# Patient Record
Sex: MALE | Race: WHITE | Employment: PART TIME | RURAL
[De-identification: names, ages, dates, MRNs, and addresses within clinical notes are randomized per-mention and may not be internally consistent; named-entity substitution may affect disease eponyms.]

---

## 2017-01-04 ENCOUNTER — OFFICE VISIT (OUTPATIENT)
Dept: PRIMARY CARE CLINIC | Age: 72
End: 2017-01-04
Payer: MEDICARE

## 2017-01-04 VITALS
DIASTOLIC BLOOD PRESSURE: 78 MMHG | OXYGEN SATURATION: 97 % | HEIGHT: 72 IN | BODY MASS INDEX: 31.69 KG/M2 | RESPIRATION RATE: 20 BRPM | WEIGHT: 234 LBS | SYSTOLIC BLOOD PRESSURE: 130 MMHG | HEART RATE: 71 BPM

## 2017-01-04 DIAGNOSIS — I10 ESSENTIAL HYPERTENSION: Primary | ICD-10-CM

## 2017-01-04 DIAGNOSIS — Z23 NEED FOR PNEUMOCOCCAL VACCINATION: ICD-10-CM

## 2017-01-04 DIAGNOSIS — M17.0 PRIMARY OSTEOARTHRITIS OF BOTH KNEES: ICD-10-CM

## 2017-01-04 DIAGNOSIS — I25.10 CORONARY ARTERY DISEASE INVOLVING NATIVE CORONARY ARTERY OF NATIVE HEART WITHOUT ANGINA PECTORIS: ICD-10-CM

## 2017-01-04 PROCEDURE — 99214 OFFICE O/P EST MOD 30 MIN: CPT | Performed by: FAMILY MEDICINE

## 2017-01-04 PROCEDURE — 90670 PCV13 VACCINE IM: CPT | Performed by: FAMILY MEDICINE

## 2017-01-04 PROCEDURE — G0009 ADMIN PNEUMOCOCCAL VACCINE: HCPCS | Performed by: FAMILY MEDICINE

## 2017-01-04 RX ORDER — CLOPIDOGREL BISULFATE 75 MG/1
75 TABLET ORAL DAILY
COMMUNITY
Start: 2016-12-19 | End: 2017-12-28 | Stop reason: ALTCHOICE

## 2017-01-04 RX ORDER — ATORVASTATIN CALCIUM 80 MG/1
80 TABLET, FILM COATED ORAL NIGHTLY
COMMUNITY
Start: 2016-12-19 | End: 2017-04-04 | Stop reason: SINTOL

## 2017-01-04 RX ORDER — CARVEDILOL 12.5 MG/1
12.5 TABLET ORAL EVERY 12 HOURS
COMMUNITY
Start: 2016-12-19

## 2017-01-04 RX ORDER — LISINOPRIL 10 MG/1
10 TABLET ORAL DAILY
COMMUNITY
Start: 2016-12-06

## 2017-01-04 ASSESSMENT — ENCOUNTER SYMPTOMS
SHORTNESS OF BREATH: 0
EYE DISCHARGE: 0
EYE REDNESS: 0
SORE THROAT: 0
VOMITING: 0
CONSTIPATION: 0
NAUSEA: 0
EYE ITCHING: 0
DIARRHEA: 0
COUGH: 0
ABDOMINAL PAIN: 0
RHINORRHEA: 0

## 2017-01-05 PROBLEM — I25.10 CORONARY ARTERY DISEASE INVOLVING NATIVE CORONARY ARTERY OF NATIVE HEART WITHOUT ANGINA PECTORIS: Status: ACTIVE | Noted: 2017-01-05

## 2017-02-15 ENCOUNTER — OFFICE VISIT (OUTPATIENT)
Dept: CARDIOLOGY | Facility: CLINIC | Age: 72
End: 2017-02-15

## 2017-02-15 VITALS
DIASTOLIC BLOOD PRESSURE: 92 MMHG | WEIGHT: 230.8 LBS | BODY MASS INDEX: 31.26 KG/M2 | HEART RATE: 67 BPM | HEIGHT: 72 IN | SYSTOLIC BLOOD PRESSURE: 136 MMHG

## 2017-02-15 DIAGNOSIS — E78.5 DYSLIPIDEMIA: ICD-10-CM

## 2017-02-15 DIAGNOSIS — Z72.0 TOBACCO ABUSE: ICD-10-CM

## 2017-02-15 DIAGNOSIS — I25.10 CORONARY ARTERY DISEASE INVOLVING NATIVE CORONARY ARTERY OF NATIVE HEART WITHOUT ANGINA PECTORIS: Primary | ICD-10-CM

## 2017-02-15 DIAGNOSIS — I65.23 BILATERAL CAROTID ARTERY STENOSIS: ICD-10-CM

## 2017-02-15 DIAGNOSIS — I10 ESSENTIAL HYPERTENSION: ICD-10-CM

## 2017-02-15 PROCEDURE — 99213 OFFICE O/P EST LOW 20 MIN: CPT | Performed by: INTERNAL MEDICINE

## 2017-02-15 NOTE — PROGRESS NOTES
[facility] AT [address], [address]  [place], [place], [address] [phone]  WWW.[facility]"[employer]           OUTPATIENT CLINIC FOLLOW UP NOTE    Encounter Date:11/02/2016    Patient Care Team:  Patient Care Team:  Mariia Dallas MD as PCP - General (Hospice and Palliative Medicine)    Subjective:   Reason for consultation:   Chief Complaint   Patient presents with   • Follow-up     CAD/HTN/ACS       HPI:    Thor Ca is a 71 y.o. male.  History of Present Illness  The patient is a 70-year-old gentleman with a recent  Non-STEMI after presentation of chest pain and elevated troponin.  Coronary angiography 9/2016 revealed at worst an intermediate mid LAD stenosis which was not functionally significant by FFR.  It is theorized that he self revascularized prior to angiography.  Echocardiography revealed no significant wall motion abnormality or drop in his ejection fraction.  Today he presents for follow-up and is doing well.      He has not had a recurrence of his chest pain.  He has been active [place] symptoms of angina, dyspnea with exertion, PND, othopnea, lower external edema.    The patient continues to smoke occasionally.  He believes the nicotine patches helped but was not able to quit entirely    PFSH:  Patient Active Problem List   Diagnosis   • Coronary artery disease involving native coronary artery of native heart without angina pectoris   • Essential hypertension   • ACS (acute coronary syndrome)   • Tobacco abuse   • Dyslipidemia         Current Outpatient Prescriptions:   •  aspirin 81 MG tablet, Take 2 tablets by mouth daily., Disp: 30 tablet, Rfl: 1  •  atorvastatin (LIPITOR) 80 MG tablet, Take 1 tablet by mouth Every Night., Disp: 30 tablet, Rfl: 10  •  carvedilol (COREG) 12.5 MG tablet, Take 1 tablet by mouth Every 12 (Twelve) Hours., Disp: 60 tablet, Rfl: 10  •  clopidogrel (PLAVIX) 75 MG tablet, Take 1 tablet  "by mouth Daily., Disp: 30 tablet, Rfl: 8  •  lisinopril (PRINIVIL,ZESTRIL) 10 MG tablet, Take 1 tablet by mouth Daily., Disp: 30 tablet, Rfl: 11  •  nicotine (NICODERM CQ) 21 MG/24HR patch, Place 1 patch on the skin Daily., Disp: 30 patch, Rfl: 0    No Known Allergies     reports that he has been smoking Cigarettes.  He has a 25.00 pack-year smoking history. He has never used smokeless tobacco.    Family History   Problem Relation Age of Onset   • Heart disease Mother    • Diabetes Mother        Review of Systems:  Negative for exertional chest pain, dyspnea with exertion, orthopnea, PND, lower extremity edema, palpitations, lightheadedness, syncope.   Review of Systems  All other systems reviewed and are negative.    Objective:   Blood pressure 136/92, pulse 67, height 72\" (182.9 cm), weight 230 lb 12.8 oz (105 kg).  Physical Exam  CONSTITUTIONAL: No acute distress, normal affect  NECK: No masses. Trachea midline  RESPIRATORY: Normal effort. Clear to auscultation bilaterally without wheezing or rales  CARDIOVASCULAR: Jugular venous pressure within normal limits. Carotid bruit right side; normal carotid upstrokes without bruits.  Regular rate and rhythm with normal S1 and S2. Without murmur, gallop or rub.  PERIPHERAL VASCULAR: Normal radial pulses bilaterally. There is very mild left lower extremity edema bilaterally.    Labs:  Lab Results   Component Value Date    ALT 29 09/16/2016    AST 38 (H) 09/16/2016     Lab Results   Component Value Date    CHOL 179 09/16/2016    TRIG 236 (H) 09/16/2016    HDL 37 (L) 09/16/2016    LDLDIRECT 113 09/16/2016    CREATININE 0.90 09/17/2016       Diagnostic Data:    Procedures  Coronary Angiogram 9/2016  Mild to moderate 2 vessel CAD involving the LAD and Ramus, no culprit lesion or functionally significant stenosis was found for intervention. Borderline 50% mid LAD stenosis with 0.91 FFR. The patient possibly had a ruptured plaque that has since revascularized.    TTE " 9/2016  · Left ventricular function is normal. Estimated EF = 64%.  · Left ventricular diastolic dysfunction (grade I a) consistent with impaired relaxation.  · Left ventricular wall thickness is consistent with mild concentric hypertrophy.  · All left ventricular wall segments contract normally.  · Mild aortic valve stenosis is present.  · There is no evidence of pericardial effusion.  · Estimated right ventricular systolic pressure from tricuspid regurgitation is normal (<35 mmHg).    Assessment and Plan:   Coronary artery disease involving native coronary artery of native heart without angina pectoris  Comments:  -CCS 0, NSTEMI 9/2016, no PCI, EF normal  -Continue DAPT for at least 1 year (started 9/2016)  -Carvedilol, statin     Essential hypertension  Comments:  -130/80s at home  -Carvedilol, lisinopril     Tobacco abuse  Comments:  -Smoking a lot less, but still smoking; used nicotine patch this past winter 2016     Bruit of right carotid artery  Comments:  -Duplex with mild to moderate disease; DAPT, statin    Dyslipidemia  Comments:  -High dose statin    -Return in about 9 months (around 11/15/2017).  -Patient going fishing in [place]    Bill Ward MD, MSc, FACC  Interventional Cardiology  [facility]    Addendum  Carotid US with mild to moderate bilateral atherosclerosis 2/2017    Bill Ward MD, MSc, FACC  Interventional Cardiology  [facility]

## 2017-04-04 ENCOUNTER — HOSPITAL ENCOUNTER (OUTPATIENT)
Dept: OTHER | Age: 72
Discharge: HOME | End: 2017-04-04
Attending: FAMILY MEDICINE | Admitting: FAMILY MEDICINE

## 2017-04-04 ENCOUNTER — OFFICE VISIT (OUTPATIENT)
Dept: PRIMARY CARE CLINIC | Age: 72
End: 2017-04-04
Payer: MEDICARE

## 2017-04-04 VITALS
DIASTOLIC BLOOD PRESSURE: 85 MMHG | HEART RATE: 67 BPM | WEIGHT: 228.4 LBS | OXYGEN SATURATION: 98 % | SYSTOLIC BLOOD PRESSURE: 138 MMHG | RESPIRATION RATE: 20 BRPM | BODY MASS INDEX: 30.94 KG/M2 | HEIGHT: 72 IN

## 2017-04-04 DIAGNOSIS — Z12.11 SCREENING FOR COLORECTAL CANCER: ICD-10-CM

## 2017-04-04 DIAGNOSIS — M17.0 PRIMARY OSTEOARTHRITIS OF BOTH KNEES: ICD-10-CM

## 2017-04-04 DIAGNOSIS — I10 ESSENTIAL HYPERTENSION: Primary | ICD-10-CM

## 2017-04-04 DIAGNOSIS — I25.10 CORONARY ARTERY DISEASE INVOLVING NATIVE CORONARY ARTERY OF NATIVE HEART WITHOUT ANGINA PECTORIS: ICD-10-CM

## 2017-04-04 DIAGNOSIS — Z12.12 SCREENING FOR COLORECTAL CANCER: ICD-10-CM

## 2017-04-04 LAB
A/G RATIO: 1.6 (ref 0.8–2)
ALBUMIN SERPL-MCNC: 4.1 G/DL (ref 3.4–4.8)
ALP BLD-CCNC: 110 U/L (ref 25–100)
ALT SERPL-CCNC: 23 U/L (ref 4–36)
ANION GAP SERPL CALCULATED.3IONS-SCNC: 14 MMOL/L (ref 3–16)
AST SERPL-CCNC: 20 U/L (ref 8–33)
BILIRUB SERPL-MCNC: 0.3 MG/DL (ref 0.3–1.2)
BUN BLDV-MCNC: 21 MG/DL (ref 6–20)
CALCIUM SERPL-MCNC: 9.5 MG/DL (ref 8.5–10.5)
CHLORIDE BLD-SCNC: 106 MMOL/L (ref 98–107)
CHOLESTEROL, TOTAL: 117 MG/DL (ref 0–200)
CO2: 23 MMOL/L (ref 20–30)
CREAT SERPL-MCNC: 1.3 MG/DL (ref 0.4–1.2)
GFR AFRICAN AMERICAN: >59
GFR NON-AFRICAN AMERICAN: 55
GLOBULIN: 2.5 G/DL
GLUCOSE BLD-MCNC: 105 MG/DL (ref 74–106)
HCT VFR BLD CALC: 39.1 % (ref 40–54)
HDLC SERPL-MCNC: 38 MG/DL (ref 40–60)
HEMOGLOBIN: 13.1 G/DL (ref 13–18)
LDL CHOLESTEROL CALCULATED: 59 MG/DL
LDL CHOLESTEROL DIRECT: 66 MG/DL
MCH RBC QN AUTO: 31.6 PG (ref 27–32)
MCHC RBC AUTO-ENTMCNC: 33.5 G/DL (ref 31–35)
MCV RBC AUTO: 94.2 FL (ref 80–100)
PDW BLD-RTO: 13.3 % (ref 11–16)
PLATELET # BLD: 269 K/UL (ref 150–400)
PMV BLD AUTO: 10.9 FL (ref 6–10)
POTASSIUM SERPL-SCNC: 4.6 MMOL/L (ref 3.4–5.1)
RBC # BLD: 4.15 M/UL (ref 4.5–6)
SODIUM BLD-SCNC: 143 MMOL/L (ref 136–145)
TOTAL PROTEIN: 6.6 G/DL (ref 6.4–8.3)
TRIGL SERPL-MCNC: 99 MG/DL (ref 0–249)
VLDLC SERPL CALC-MCNC: 20 MG/DL
WBC # BLD: 10.7 K/UL (ref 4–11)

## 2017-04-04 PROCEDURE — 20610 DRAIN/INJ JOINT/BURSA W/O US: CPT | Performed by: FAMILY MEDICINE

## 2017-04-04 PROCEDURE — 99214 OFFICE O/P EST MOD 30 MIN: CPT | Performed by: FAMILY MEDICINE

## 2017-04-04 RX ORDER — ROSUVASTATIN CALCIUM 40 MG/1
40 TABLET, COATED ORAL EVERY EVENING
Qty: 30 TABLET | Refills: 3 | Status: SHIPPED | OUTPATIENT
Start: 2017-04-04 | End: 2017-10-25 | Stop reason: SDUPTHER

## 2017-04-04 RX ORDER — TRIAMCINOLONE ACETONIDE 40 MG/ML
40 INJECTION, SUSPENSION INTRA-ARTICULAR; INTRAMUSCULAR ONCE
Status: COMPLETED | OUTPATIENT
Start: 2017-04-04 | End: 2017-04-04

## 2017-04-04 RX ADMIN — TRIAMCINOLONE ACETONIDE 40 MG: 40 INJECTION, SUSPENSION INTRA-ARTICULAR; INTRAMUSCULAR at 10:13

## 2017-04-04 ASSESSMENT — ENCOUNTER SYMPTOMS
SORE THROAT: 0
CONSTIPATION: 0
DIARRHEA: 0
NAUSEA: 0
COUGH: 0
SHORTNESS OF BREATH: 0
VOMITING: 0
RHINORRHEA: 0
EYE ITCHING: 0
ABDOMINAL PAIN: 0
BACK PAIN: 1
EYE DISCHARGE: 0
EYE REDNESS: 0

## 2017-10-25 ENCOUNTER — OFFICE VISIT (OUTPATIENT)
Dept: PRIMARY CARE CLINIC | Age: 72
End: 2017-10-25
Payer: MEDICARE

## 2017-10-25 VITALS
BODY MASS INDEX: 29.53 KG/M2 | DIASTOLIC BLOOD PRESSURE: 76 MMHG | HEIGHT: 72 IN | OXYGEN SATURATION: 97 % | RESPIRATION RATE: 20 BRPM | SYSTOLIC BLOOD PRESSURE: 134 MMHG | WEIGHT: 218 LBS | HEART RATE: 69 BPM

## 2017-10-25 DIAGNOSIS — I25.10 CORONARY ARTERY DISEASE INVOLVING NATIVE CORONARY ARTERY OF NATIVE HEART WITHOUT ANGINA PECTORIS: ICD-10-CM

## 2017-10-25 DIAGNOSIS — Z12.11 COLON CANCER SCREENING: ICD-10-CM

## 2017-10-25 DIAGNOSIS — M17.0 PRIMARY OSTEOARTHRITIS OF BOTH KNEES: ICD-10-CM

## 2017-10-25 DIAGNOSIS — I10 ESSENTIAL HYPERTENSION: Primary | ICD-10-CM

## 2017-10-25 PROCEDURE — G8419 CALC BMI OUT NRM PARAM NOF/U: HCPCS | Performed by: FAMILY MEDICINE

## 2017-10-25 PROCEDURE — 1123F ACP DISCUSS/DSCN MKR DOCD: CPT | Performed by: FAMILY MEDICINE

## 2017-10-25 PROCEDURE — 3017F COLORECTAL CA SCREEN DOC REV: CPT | Performed by: FAMILY MEDICINE

## 2017-10-25 PROCEDURE — G8598 ASA/ANTIPLAT THER USED: HCPCS | Performed by: FAMILY MEDICINE

## 2017-10-25 PROCEDURE — 99214 OFFICE O/P EST MOD 30 MIN: CPT | Performed by: FAMILY MEDICINE

## 2017-10-25 PROCEDURE — 4040F PNEUMOC VAC/ADMIN/RCVD: CPT | Performed by: FAMILY MEDICINE

## 2017-10-25 PROCEDURE — 20610 DRAIN/INJ JOINT/BURSA W/O US: CPT | Performed by: FAMILY MEDICINE

## 2017-10-25 PROCEDURE — 4004F PT TOBACCO SCREEN RCVD TLK: CPT | Performed by: FAMILY MEDICINE

## 2017-10-25 PROCEDURE — G8427 DOCREV CUR MEDS BY ELIG CLIN: HCPCS | Performed by: FAMILY MEDICINE

## 2017-10-25 PROCEDURE — G8484 FLU IMMUNIZE NO ADMIN: HCPCS | Performed by: FAMILY MEDICINE

## 2017-10-25 RX ORDER — NICOTINE 21 MG/24HR
1 PATCH, TRANSDERMAL 24 HOURS TRANSDERMAL
COMMUNITY
Start: 2016-11-02 | End: 2017-10-25

## 2017-10-25 RX ORDER — OXYCODONE AND ACETAMINOPHEN 7.5; 325 MG/1; MG/1
1 TABLET ORAL EVERY 6 HOURS PRN
Qty: 90 TABLET | Refills: 0 | Status: SHIPPED | OUTPATIENT
Start: 2017-10-25 | End: 2018-04-25 | Stop reason: SDUPTHER

## 2017-10-25 RX ORDER — ROSUVASTATIN CALCIUM 40 MG/1
40 TABLET, COATED ORAL EVERY EVENING
Qty: 30 TABLET | Refills: 3 | Status: SHIPPED | OUTPATIENT
Start: 2017-10-25 | End: 2018-04-25

## 2017-10-25 RX ORDER — TRIAMCINOLONE ACETONIDE 40 MG/ML
40 INJECTION, SUSPENSION INTRA-ARTICULAR; INTRAMUSCULAR ONCE
Status: COMPLETED | OUTPATIENT
Start: 2017-10-25 | End: 2017-10-25

## 2017-10-25 RX ADMIN — TRIAMCINOLONE ACETONIDE 40 MG: 40 INJECTION, SUSPENSION INTRA-ARTICULAR; INTRAMUSCULAR at 15:21

## 2017-10-25 ASSESSMENT — ENCOUNTER SYMPTOMS
ABDOMINAL PAIN: 0
CONSTIPATION: 0
VOMITING: 0
RHINORRHEA: 0
DIARRHEA: 0
BACK PAIN: 1
SHORTNESS OF BREATH: 0
EYE DISCHARGE: 0
EYE ITCHING: 0
SORE THROAT: 0
EYE REDNESS: 0
COUGH: 0
NAUSEA: 0

## 2017-10-25 NOTE — PROGRESS NOTES
SUBJECTIVE:    Patient ID: Gómez Forrester is a 73 y.o. male. Chief Complaint   Patient presents with    Hypertension     f/u    Coronary Artery Disease     f/u    Arthritis     f/u    Knee Pain     left         HPI: Patient has history of Hypertension. They are taking: lisinopril (Prinivil) and coreg. Patient is compliant with medications. Today, BP is under control. He does not check His BP at home. He does monitor His salt intake. He does not exercise regularly. Patient does have known CAD as well. He is asymptomatic. Patient denies CP, SOA, palpitations. He does f/u with cardiology regularly. He is on appropriate medications of crestor, aspirin, plavix, lisinopril, and coreg. Patient has been complaining of left knee pain for several years. This is secondary to arthritis. Pain is 6/10. Pain is aching. Pain radiates to lower leg. Pain is worse with movement, walking, standing, night, better with rest. Symptoms are getting worse . He does not have numbness, tingling  Patient has tried percocet and steroid injections with marked improvement. Patient denies side effect from medications. The medication does help with ADL's and he takes it only occasionally. He has an appt with ortho in a couple months and he plans to get a knee replacement. Joint injection:  left Knee   Consent obtained. Risks and benefits explained and patient understands. left lateral knee marked for site of injection. Area cleansed with betadine and alcohol. Ethyl chloride applied to the area. Injection given with 25 gauge needle with 2cc 1% Lidocaine, and 1cc 40mg Kenalog. There were no complications and the patient tolerated the procedure well. Patient's medications, allergies, past medical, surgical, social and family histories were reviewed and updated as appropriate. Review of Systems   Constitutional: Negative for chills, fatigue and fever.    HENT: Negative for congestion, ear pain, rhinorrhea coronary artery of native heart without angina pectoris     Stable at this time. Patient is to continue lisinopril, crestor, carvedilol, aspirin and Plavix. He is to continue to follow-up with cardiology as scheduled. Relevant Medications    rosuvastatin (CRESTOR) 40 MG tablet      Other Visit Diagnoses     Colon cancer screening        Relevant Orders    POCT Fecal Immunochemical Test (FIT)        Controlled Substances Monitoring:     Attestation: The Prescription Monitoring Report was requested today but not available. Kamryn Coker DO)  Documentation: Possible medication side effects, risk of tolerance and/or dependence, and alternative treatments discussed., Obtaining appropriate analgesic effect of treatment., No signs of potential drug abuse or diversion identified., Existing medication contract. Kamryn Coker DO)      Return in about 3 months (around 1/25/2018) for HTN, CAD, knee pain.

## 2017-10-25 NOTE — PATIENT INSTRUCTIONS
you're down to about seven cigarettes a day, it's time to set your target quit date, and get ready to stick to it. Don't Smoke \"Automatically\"   Smoke only those cigarettes you really want. Catch yourself before you light up a cigarette out of pure habit. Don't empty your ashtrays. This will remind you of how many cigarettes you've smoked each day, and the sight and the smell of stale cigarettes butts will be very unpleasant. Make yourself aware of each cigarette by using the opposite hand or putting cigarettes in an unfamiliar location or a different pocket to break the automatic reach. If you light up many times during the day without even thinking about it, try to look in a mirror each time you put a match to your cigarette. You may decide you don't need it. Make Smoking Inconvenient   Stop buying cigarettes by the carton. Wait until one pack is empty before you buy another. Stop carrying cigarettes with you at home or at work. Make them difficult to get to. Make Smoking Unpleasant   Smoke only under circumstances that aren't especially pleasurable for you. If you like to smoke with others, smoke alone. Turn your chair to an empty corner and focus only on the cigarette you are smoking and all its many negative effects. Collect all your cigarette butts in one large glass container as a visual reminder of the filth made by smoking. Just Before Quitting   Practice going without cigarettes. Don't think of never smoking again. Think of quitting in terms of one day at a time . Tell yourself you won't smoke today, and then don't. Clean your clothes to rid them of the cigarette smell, which can linger a long time. On the Day You Quit   Throw away all your cigarettes and matches. Hide your lighters and ashtrays. Visit the dentist and have your teeth cleaned to get rid of tobacco stains. Notice how nice they look and resolve to keep them that way.    Make a list of things you'd like to buy for

## 2017-11-10 ENCOUNTER — TELEPHONE (OUTPATIENT)
Dept: PRIMARY CARE CLINIC | Age: 72
End: 2017-11-10

## 2017-11-10 NOTE — ASSESSMENT & PLAN NOTE
Stable at this time. Patient is to continue lisinopril, crestor, carvedilol, aspirin and Plavix. He is to continue to follow-up with cardiology as scheduled.

## 2017-11-15 ENCOUNTER — OFFICE VISIT (OUTPATIENT)
Dept: CARDIOLOGY | Facility: CLINIC | Age: 72
End: 2017-11-15

## 2017-11-15 VITALS
WEIGHT: 214.8 LBS | SYSTOLIC BLOOD PRESSURE: 130 MMHG | DIASTOLIC BLOOD PRESSURE: 88 MMHG | HEART RATE: 66 BPM | HEIGHT: 72 IN | BODY MASS INDEX: 29.09 KG/M2

## 2017-11-15 DIAGNOSIS — I25.10 CORONARY ARTERY DISEASE INVOLVING NATIVE CORONARY ARTERY OF NATIVE HEART WITHOUT ANGINA PECTORIS: Primary | ICD-10-CM

## 2017-11-15 DIAGNOSIS — E78.5 DYSLIPIDEMIA: ICD-10-CM

## 2017-11-15 DIAGNOSIS — I10 ESSENTIAL HYPERTENSION: ICD-10-CM

## 2017-11-15 DIAGNOSIS — I73.9 CLAUDICATION (HCC): ICD-10-CM

## 2017-11-15 DIAGNOSIS — I65.23 BILATERAL CAROTID ARTERY STENOSIS: ICD-10-CM

## 2017-11-15 DIAGNOSIS — Z72.0 TOBACCO ABUSE: ICD-10-CM

## 2017-11-15 PROCEDURE — 99214 OFFICE O/P EST MOD 30 MIN: CPT | Performed by: INTERNAL MEDICINE

## 2017-11-15 NOTE — PROGRESS NOTES
[facility] AT [address], [address]  [place], [place], [address] [phone]  WWW.[facility]           OUTPATIENT CLINIC FOLLOW UP NOTE    Patient Care Team:  Patient Care Team:  Jeanne Winston DO as PCP - General (Family Medicine)    Subjective:   Reason for consultation:   Chief Complaint   Patient presents with   • Coronary Artery Disease       HPI:    Thor Ca is a 71 y.o. male.  Coronary Artery Disease       The patient has a history of an NSTEMI after a presentation of chest pain in 9/2016. LHC revealed at worst an intermediate mid LAD stenosis which was not functionally significant by FFR.  It is theorized that he auto-revascularized prior to angiography. Echocardiography revealed no significant wall motion abnormality or drop in his ejection fraction.  He also has a history of hypertension, hyperlipidemia, tobacco abuse, bilateral carotid stenosis, possible claudication.  Today he presents for follow-up    Since his last visit he has overall been doing well.  He did have a period of myalgias with atorvastatin and was switched to another statin, possibly Crestor 40 mg nightly.  He states that he's been tolerating Crestor well without significant myalgias.  He does have bilateral calf discomfort when walking.  This is mild, acute on chronic, worse with inclines, relieved with rest.    He has not had a recurrence of his chest pain.      The patient continues to smoke occasionally.  He believes the nicotine patches helped but was not able to quit entirely    PFSH:  Patient Active Problem List   Diagnosis   • Coronary artery disease involving native coronary artery of native heart without angina pectoris   • Essential hypertension   • Tobacco abuse   • Dyslipidemia   • Bilateral carotid artery stenosis         Current Outpatient Prescriptions:   •  aspirin 81 MG tablet, Take 2 tablets by mouth daily., Disp: 30 tablet, Rfl: 1  •  carvedilol (COREG) 12.5  "MG tablet, Take 1 tablet by mouth Every 12 (Twelve) Hours., Disp: 60 tablet, Rfl: 10  •  lisinopril (PRINIVIL,ZESTRIL) 10 MG tablet, Take 1 tablet by mouth Daily., Disp: 30 tablet, Rfl: 11    No Known Allergies     reports that he has been smoking Cigarettes.  He has a 25.00 pack-year smoking history. He has never used smokeless tobacco.    Family History   Problem Relation Age of Onset   • Heart disease Mother    • Diabetes Mother        Review of Systems:  Negative for exertional chest pain, dyspnea with exertion, orthopnea, PND, lower extremity edema, palpitations, lightheadedness, syncope.   Review of Systems  All other systems reviewed and are negative.    Objective:   Blood pressure 130/88, pulse 66, height 72\" (182.9 cm), weight 214 lb 12.8 oz (97.4 kg).  Physical Exam  CONSTITUTIONAL: No acute distress, normal affect  RESPIRATORY: Normal effort. Clear to auscultation bilaterally without wheezing or rales  CARDIOVASCULAR: Carotid bruit right side. Regular rate and rhythm with normal S1 and S2. Without gallop or rub. CHAR at RUSB without radiation  PERIPHERAL VASCULAR: Normal radial pulses bilaterally. There is mild right lower extremity edema     Labs:  Lab Results   Component Value Date    ALT 29 09/16/2016    AST 38 (H) 09/16/2016     Lab Results   Component Value Date    CHOL 179 09/16/2016    TRIG 236 (H) 09/16/2016    HDL 37 (L) 09/16/2016    LDLDIRECT 113 09/16/2016    CREATININE 0.90 09/17/2016       Diagnostic Data:    Procedures  Coronary Angiogram 9/2016  Mild to moderate 2 vessel CAD involving the LAD and Ramus, no culprit lesion or functionally significant stenosis was found for intervention. Borderline 50% mid LAD stenosis with 0.91 FFR. The patient possibly had a ruptured plaque that has since revascularized.    Carotid Duplex US 9/2016  · Proximal right internal carotid artery stenosis of 50-69%.  · Proximal left internal carotid artery stenosis of 0-49%.    TTE 9/2016  · Left ventricular " function is normal. Estimated EF = 64%.  · Left ventricular diastolic dysfunction (grade I a) consistent with impaired relaxation.  · Left ventricular wall thickness is consistent with mild concentric hypertrophy.  · All left ventricular wall segments contract normally.  · Mild aortic valve stenosis is present.  · There is no evidence of pericardial effusion.  · Estimated right ventricular systolic pressure from tricuspid regurgitation is normal (<35 mmHg).    Assessment and Plan:   Coronary artery disease involving native coronary artery of native heart without angina pectoris  -CCS 0, NSTEMI 9/2016, negative iFR of intermediate LAD disease, no PCI, EF normal  -Discontinue clopidogrel  -Continue ASA, carvedilol, statin     Essential hypertension  -130/80s at home  -Carvedilol, lisinopril     Tobacco abuse  -Smoking a lot less, but still smoking; used nicotine patch winter 2016     Bruit of right carotid artery  -Duplex with mild to moderate disease; ASA, statin    Dyslipidemia  -Myalgias when on atorvastatin  -Continue rosuvastatin  -Repeat labs    Claudication  -Mild symptoms.  Recommended an arterial duplex ultrasound and exercise ABIs if the symptoms persist/worsen.  The patient would like to hold off for now.  -Recommended continued exercise, especially walking  -Continue aspirin and statin.    -Return in about 9 months (around 8/15/2018).  -Patient going fishing in [place]    Bill Ward MD, MSc, FACC  Interventional Cardiology  [facility]    Bill Ward MD, MSc, FACC  Interventional Cardiology  [facility]

## 2017-11-17 ENCOUNTER — HOSPITAL ENCOUNTER (OUTPATIENT)
Dept: OTHER | Age: 72
Discharge: HOME | End: 2017-11-17
Attending: INTERNAL MEDICINE | Admitting: INTERNAL MEDICINE

## 2017-11-20 DIAGNOSIS — I10 ESSENTIAL HYPERTENSION: ICD-10-CM

## 2017-11-20 DIAGNOSIS — I25.10 CORONARY ARTERY DISEASE INVOLVING NATIVE CORONARY ARTERY OF NATIVE HEART WITHOUT ANGINA PECTORIS: ICD-10-CM

## 2017-11-20 DIAGNOSIS — E78.5 DYSLIPIDEMIA: ICD-10-CM

## 2017-11-20 DIAGNOSIS — Z72.0 TOBACCO ABUSE: ICD-10-CM

## 2017-11-20 DIAGNOSIS — I65.23 BILATERAL CAROTID ARTERY STENOSIS: ICD-10-CM

## 2017-11-21 ENCOUNTER — TELEPHONE (OUTPATIENT)
Dept: CARDIOLOGY | Facility: CLINIC | Age: 72
End: 2017-11-21

## 2017-11-21 NOTE — TELEPHONE ENCOUNTER
Results of lab work reviewed with the patient's spouse.  I advised that his renal function has increased and he should follow up with his PCP to further investigate this.  Understanding verbalized at this time.

## 2017-12-08 RX ORDER — LISINOPRIL 10 MG/1
TABLET ORAL
Qty: 30 TABLET | Refills: 11 | Status: SHIPPED | OUTPATIENT
Start: 2017-12-08 | End: 2018-07-05 | Stop reason: SDUPTHER

## 2017-12-12 RX ORDER — CARVEDILOL 12.5 MG/1
12.5 TABLET ORAL EVERY 12 HOURS SCHEDULED
Qty: 60 TABLET | Refills: 11 | Status: SHIPPED | OUTPATIENT
Start: 2017-12-12 | End: 2018-12-14 | Stop reason: SDUPTHER

## 2017-12-28 ENCOUNTER — OFFICE VISIT (OUTPATIENT)
Dept: PRIMARY CARE CLINIC | Age: 72
End: 2017-12-28
Payer: MEDICARE

## 2017-12-28 VITALS
BODY MASS INDEX: 29.26 KG/M2 | DIASTOLIC BLOOD PRESSURE: 76 MMHG | TEMPERATURE: 98.2 F | OXYGEN SATURATION: 99 % | HEART RATE: 67 BPM | SYSTOLIC BLOOD PRESSURE: 136 MMHG | WEIGHT: 216 LBS | RESPIRATION RATE: 20 BRPM | HEIGHT: 72 IN

## 2017-12-28 DIAGNOSIS — F41.9 ANXIETY: ICD-10-CM

## 2017-12-28 DIAGNOSIS — Z12.11 COLON CANCER SCREENING: ICD-10-CM

## 2017-12-28 DIAGNOSIS — R11.2 NAUSEA AND VOMITING, INTRACTABILITY OF VOMITING NOT SPECIFIED, UNSPECIFIED VOMITING TYPE: Primary | ICD-10-CM

## 2017-12-28 DIAGNOSIS — K21.9 GASTROESOPHAGEAL REFLUX DISEASE, ESOPHAGITIS PRESENCE NOT SPECIFIED: ICD-10-CM

## 2017-12-28 LAB
CONTROL: NORMAL
HEMOCCULT STL QL: NORMAL

## 2017-12-28 PROCEDURE — G8427 DOCREV CUR MEDS BY ELIG CLIN: HCPCS | Performed by: FAMILY MEDICINE

## 2017-12-28 PROCEDURE — G8484 FLU IMMUNIZE NO ADMIN: HCPCS | Performed by: FAMILY MEDICINE

## 2017-12-28 PROCEDURE — G8598 ASA/ANTIPLAT THER USED: HCPCS | Performed by: FAMILY MEDICINE

## 2017-12-28 PROCEDURE — 99213 OFFICE O/P EST LOW 20 MIN: CPT | Performed by: FAMILY MEDICINE

## 2017-12-28 PROCEDURE — 1123F ACP DISCUSS/DSCN MKR DOCD: CPT | Performed by: FAMILY MEDICINE

## 2017-12-28 PROCEDURE — 4040F PNEUMOC VAC/ADMIN/RCVD: CPT | Performed by: FAMILY MEDICINE

## 2017-12-28 PROCEDURE — 3017F COLORECTAL CA SCREEN DOC REV: CPT | Performed by: FAMILY MEDICINE

## 2017-12-28 PROCEDURE — 82274 ASSAY TEST FOR BLOOD FECAL: CPT | Performed by: FAMILY MEDICINE

## 2017-12-28 PROCEDURE — 4004F PT TOBACCO SCREEN RCVD TLK: CPT | Performed by: FAMILY MEDICINE

## 2017-12-28 PROCEDURE — G8419 CALC BMI OUT NRM PARAM NOF/U: HCPCS | Performed by: FAMILY MEDICINE

## 2017-12-28 RX ORDER — PANTOPRAZOLE SODIUM 20 MG/1
20 TABLET, DELAYED RELEASE ORAL DAILY
Qty: 30 TABLET | Refills: 3 | Status: SHIPPED | OUTPATIENT
Start: 2017-12-28 | End: 2018-04-25 | Stop reason: SDUPTHER

## 2017-12-28 RX ORDER — HYDROXYZINE HYDROCHLORIDE 25 MG/1
25 TABLET, FILM COATED ORAL 3 TIMES DAILY PRN
Qty: 90 TABLET | Refills: 0 | Status: SHIPPED | OUTPATIENT
Start: 2017-12-28 | End: 2018-01-07

## 2018-01-14 PROBLEM — K21.9 GASTROESOPHAGEAL REFLUX DISEASE: Status: ACTIVE | Noted: 2018-01-14

## 2018-01-14 PROBLEM — F41.9 ANXIETY: Status: ACTIVE | Noted: 2018-01-14

## 2018-01-14 PROBLEM — R11.2 NAUSEA AND VOMITING: Status: ACTIVE | Noted: 2018-01-14

## 2018-01-14 ASSESSMENT — ENCOUNTER SYMPTOMS
ABDOMINAL PAIN: 0
VOMITING: 1
BACK PAIN: 1
SORE THROAT: 0
COUGH: 0
CONSTIPATION: 0
RHINORRHEA: 0
SHORTNESS OF BREATH: 0
DIARRHEA: 1
NAUSEA: 1

## 2018-01-14 NOTE — ASSESSMENT & PLAN NOTE
Anxiety appears to be situational. Will start the patient on hydroxyzine to take as needed. Discussed that this medication can help with sleep as well.

## 2018-01-25 ENCOUNTER — OFFICE VISIT (OUTPATIENT)
Dept: PRIMARY CARE CLINIC | Age: 73
End: 2018-01-25
Payer: MEDICARE

## 2018-01-25 VITALS
OXYGEN SATURATION: 98 % | HEIGHT: 72 IN | HEART RATE: 72 BPM | WEIGHT: 219 LBS | RESPIRATION RATE: 20 BRPM | DIASTOLIC BLOOD PRESSURE: 72 MMHG | BODY MASS INDEX: 29.66 KG/M2 | SYSTOLIC BLOOD PRESSURE: 128 MMHG

## 2018-01-25 DIAGNOSIS — M17.0 PRIMARY OSTEOARTHRITIS OF BOTH KNEES: Primary | ICD-10-CM

## 2018-01-25 DIAGNOSIS — F41.9 ANXIETY: ICD-10-CM

## 2018-01-25 DIAGNOSIS — I25.10 CORONARY ARTERY DISEASE INVOLVING NATIVE CORONARY ARTERY OF NATIVE HEART WITHOUT ANGINA PECTORIS: ICD-10-CM

## 2018-01-25 DIAGNOSIS — I10 ESSENTIAL HYPERTENSION: ICD-10-CM

## 2018-01-25 PROCEDURE — 99214 OFFICE O/P EST MOD 30 MIN: CPT | Performed by: FAMILY MEDICINE

## 2018-01-25 PROCEDURE — 4004F PT TOBACCO SCREEN RCVD TLK: CPT | Performed by: FAMILY MEDICINE

## 2018-01-25 PROCEDURE — 3017F COLORECTAL CA SCREEN DOC REV: CPT | Performed by: FAMILY MEDICINE

## 2018-01-25 PROCEDURE — G8484 FLU IMMUNIZE NO ADMIN: HCPCS | Performed by: FAMILY MEDICINE

## 2018-01-25 PROCEDURE — G8598 ASA/ANTIPLAT THER USED: HCPCS | Performed by: FAMILY MEDICINE

## 2018-01-25 PROCEDURE — G8419 CALC BMI OUT NRM PARAM NOF/U: HCPCS | Performed by: FAMILY MEDICINE

## 2018-01-25 PROCEDURE — 4040F PNEUMOC VAC/ADMIN/RCVD: CPT | Performed by: FAMILY MEDICINE

## 2018-01-25 PROCEDURE — 1123F ACP DISCUSS/DSCN MKR DOCD: CPT | Performed by: FAMILY MEDICINE

## 2018-01-25 PROCEDURE — G8427 DOCREV CUR MEDS BY ELIG CLIN: HCPCS | Performed by: FAMILY MEDICINE

## 2018-01-25 ASSESSMENT — PATIENT HEALTH QUESTIONNAIRE - PHQ9
SUM OF ALL RESPONSES TO PHQ9 QUESTIONS 1 & 2: 0
2. FEELING DOWN, DEPRESSED OR HOPELESS: 0
1. LITTLE INTEREST OR PLEASURE IN DOING THINGS: 0
SUM OF ALL RESPONSES TO PHQ QUESTIONS 1-9: 0

## 2018-01-25 ASSESSMENT — ENCOUNTER SYMPTOMS
EYE ITCHING: 0
ABDOMINAL PAIN: 0
NAUSEA: 0
SHORTNESS OF BREATH: 0
SORE THROAT: 0
RHINORRHEA: 1
EYE REDNESS: 0
VOMITING: 0
DIARRHEA: 0
EYE DISCHARGE: 0
BACK PAIN: 1
COUGH: 0
CONSTIPATION: 0

## 2018-01-25 NOTE — PROGRESS NOTES
contract. Red Blanks, DO)      Return in about 3 months (around 4/25/2018) for HTN,  CAD, knee pain.

## 2018-04-20 ENCOUNTER — HOSPITAL ENCOUNTER (OUTPATIENT)
Dept: OTHER | Age: 73
Discharge: HOME | End: 2018-04-20
Attending: FAMILY MEDICINE | Admitting: FAMILY MEDICINE

## 2018-04-20 DIAGNOSIS — I10 ESSENTIAL HYPERTENSION: ICD-10-CM

## 2018-04-20 DIAGNOSIS — I25.10 CORONARY ARTERY DISEASE INVOLVING NATIVE CORONARY ARTERY OF NATIVE HEART WITHOUT ANGINA PECTORIS: ICD-10-CM

## 2018-04-20 LAB
A/G RATIO: 1.5 (ref 0.8–2)
ALBUMIN SERPL-MCNC: 4.2 G/DL (ref 3.4–4.8)
ALP BLD-CCNC: 91 U/L (ref 25–100)
ALT SERPL-CCNC: 14 U/L (ref 4–36)
ANION GAP SERPL CALCULATED.3IONS-SCNC: 13 MMOL/L (ref 3–16)
AST SERPL-CCNC: 18 U/L (ref 8–33)
BILIRUB SERPL-MCNC: 0.5 MG/DL (ref 0.3–1.2)
BUN BLDV-MCNC: 20 MG/DL (ref 6–20)
CALCIUM SERPL-MCNC: 9.4 MG/DL (ref 8.5–10.5)
CHLORIDE BLD-SCNC: 104 MMOL/L (ref 98–107)
CHOLESTEROL, TOTAL: 220 MG/DL (ref 0–200)
CO2: 24 MMOL/L (ref 20–30)
CREAT SERPL-MCNC: 1.5 MG/DL (ref 0.4–1.2)
GFR AFRICAN AMERICAN: 56
GFR NON-AFRICAN AMERICAN: 46
GLOBULIN: 2.8 G/DL
GLUCOSE BLD-MCNC: 87 MG/DL (ref 74–106)
HCT VFR BLD CALC: 44.8 % (ref 40–54)
HDLC SERPL-MCNC: 43 MG/DL (ref 40–60)
HEMOGLOBIN: 14.1 G/DL (ref 13–18)
LDL CHOLESTEROL CALCULATED: 150 MG/DL
MCH RBC QN AUTO: 30.5 PG (ref 27–32)
MCHC RBC AUTO-ENTMCNC: 31.5 G/DL (ref 31–35)
MCV RBC AUTO: 97 FL (ref 80–100)
PDW BLD-RTO: 13 % (ref 11–16)
PLATELET # BLD: 207 K/UL (ref 150–400)
PMV BLD AUTO: 10.1 FL (ref 6–10)
POTASSIUM SERPL-SCNC: 4.5 MMOL/L (ref 3.4–5.1)
RBC # BLD: 4.62 M/UL (ref 4.5–6)
SODIUM BLD-SCNC: 141 MMOL/L (ref 136–145)
TOTAL PROTEIN: 7 G/DL (ref 6.4–8.3)
TRIGL SERPL-MCNC: 137 MG/DL (ref 0–249)
VLDLC SERPL CALC-MCNC: 27 MG/DL
WBC # BLD: 7.5 K/UL (ref 4–11)

## 2018-04-25 ENCOUNTER — OFFICE VISIT (OUTPATIENT)
Dept: PRIMARY CARE CLINIC | Age: 73
End: 2018-04-25
Payer: MEDICARE

## 2018-04-25 VITALS
HEIGHT: 72 IN | BODY MASS INDEX: 30.2 KG/M2 | HEART RATE: 65 BPM | RESPIRATION RATE: 20 BRPM | OXYGEN SATURATION: 98 % | SYSTOLIC BLOOD PRESSURE: 138 MMHG | WEIGHT: 223 LBS | DIASTOLIC BLOOD PRESSURE: 72 MMHG

## 2018-04-25 DIAGNOSIS — M17.0 PRIMARY OSTEOARTHRITIS OF BOTH KNEES: ICD-10-CM

## 2018-04-25 DIAGNOSIS — I10 ESSENTIAL HYPERTENSION: Primary | ICD-10-CM

## 2018-04-25 DIAGNOSIS — I25.10 CORONARY ARTERY DISEASE INVOLVING NATIVE CORONARY ARTERY OF NATIVE HEART WITHOUT ANGINA PECTORIS: ICD-10-CM

## 2018-04-25 DIAGNOSIS — E78.5 HYPERLIPIDEMIA, UNSPECIFIED HYPERLIPIDEMIA TYPE: ICD-10-CM

## 2018-04-25 PROCEDURE — 1123F ACP DISCUSS/DSCN MKR DOCD: CPT | Performed by: FAMILY MEDICINE

## 2018-04-25 PROCEDURE — 20610 DRAIN/INJ JOINT/BURSA W/O US: CPT | Performed by: FAMILY MEDICINE

## 2018-04-25 PROCEDURE — 4004F PT TOBACCO SCREEN RCVD TLK: CPT | Performed by: FAMILY MEDICINE

## 2018-04-25 PROCEDURE — 99214 OFFICE O/P EST MOD 30 MIN: CPT | Performed by: FAMILY MEDICINE

## 2018-04-25 PROCEDURE — G8417 CALC BMI ABV UP PARAM F/U: HCPCS | Performed by: FAMILY MEDICINE

## 2018-04-25 PROCEDURE — 4040F PNEUMOC VAC/ADMIN/RCVD: CPT | Performed by: FAMILY MEDICINE

## 2018-04-25 PROCEDURE — G8427 DOCREV CUR MEDS BY ELIG CLIN: HCPCS | Performed by: FAMILY MEDICINE

## 2018-04-25 PROCEDURE — 3017F COLORECTAL CA SCREEN DOC REV: CPT | Performed by: FAMILY MEDICINE

## 2018-04-25 PROCEDURE — G8598 ASA/ANTIPLAT THER USED: HCPCS | Performed by: FAMILY MEDICINE

## 2018-04-25 RX ORDER — ROSUVASTATIN CALCIUM 40 MG/1
40 TABLET, COATED ORAL EVERY EVENING
Qty: 30 TABLET | Refills: 3 | Status: SHIPPED | OUTPATIENT
Start: 2018-04-25 | End: 2018-09-04 | Stop reason: SDUPTHER

## 2018-04-25 RX ORDER — TRIAMCINOLONE ACETONIDE 40 MG/ML
40 INJECTION, SUSPENSION INTRA-ARTICULAR; INTRAMUSCULAR ONCE
Status: COMPLETED | OUTPATIENT
Start: 2018-04-25 | End: 2018-04-25

## 2018-04-25 RX ORDER — PANTOPRAZOLE SODIUM 20 MG/1
20 TABLET, DELAYED RELEASE ORAL DAILY
Qty: 30 TABLET | Refills: 3 | Status: SHIPPED | OUTPATIENT
Start: 2018-04-25 | End: 2018-09-04 | Stop reason: SDUPTHER

## 2018-04-25 RX ORDER — OXYCODONE AND ACETAMINOPHEN 7.5; 325 MG/1; MG/1
1 TABLET ORAL EVERY 6 HOURS PRN
Qty: 90 TABLET | Refills: 0 | Status: SHIPPED | OUTPATIENT
Start: 2018-04-25 | End: 2018-05-25

## 2018-04-25 RX ADMIN — TRIAMCINOLONE ACETONIDE 40 MG: 40 INJECTION, SUSPENSION INTRA-ARTICULAR; INTRAMUSCULAR at 14:01

## 2018-04-25 ASSESSMENT — ENCOUNTER SYMPTOMS
EYE REDNESS: 0
ABDOMINAL PAIN: 0
CONSTIPATION: 0
EYE ITCHING: 0
RHINORRHEA: 0
SORE THROAT: 0
BACK PAIN: 1
COUGH: 0
VOMITING: 0
SHORTNESS OF BREATH: 0
DIARRHEA: 0
EYE DISCHARGE: 0
NAUSEA: 0

## 2018-05-03 ENCOUNTER — OFFICE VISIT (OUTPATIENT)
Dept: PRIMARY CARE CLINIC | Age: 73
End: 2018-05-03
Payer: MEDICARE

## 2018-05-03 VITALS
HEART RATE: 72 BPM | DIASTOLIC BLOOD PRESSURE: 84 MMHG | BODY MASS INDEX: 30.6 KG/M2 | WEIGHT: 225.6 LBS | OXYGEN SATURATION: 97 % | SYSTOLIC BLOOD PRESSURE: 124 MMHG

## 2018-05-03 DIAGNOSIS — I25.119 CORONARY ARTERY DISEASE INVOLVING NATIVE HEART WITH ANGINA PECTORIS, UNSPECIFIED VESSEL OR LESION TYPE (HCC): ICD-10-CM

## 2018-05-03 DIAGNOSIS — Z01.818 PREOP TESTING: Primary | ICD-10-CM

## 2018-05-03 DIAGNOSIS — R79.89 ELEVATED SERUM CREATININE: ICD-10-CM

## 2018-05-03 DIAGNOSIS — I10 ESSENTIAL HYPERTENSION: ICD-10-CM

## 2018-05-03 DIAGNOSIS — M79.605 PAIN IN LEFT LEG: ICD-10-CM

## 2018-05-03 DIAGNOSIS — Z79.899 OTHER LONG TERM (CURRENT) DRUG THERAPY: ICD-10-CM

## 2018-05-03 DIAGNOSIS — M17.12 OSTEOARTHRITIS OF LEFT KNEE, UNSPECIFIED OSTEOARTHRITIS TYPE: ICD-10-CM

## 2018-05-03 LAB
BILIRUBIN, POC: ABNORMAL
BLOOD URINE, POC: ABNORMAL
CLARITY, POC: CLEAR
COLOR, POC: ABNORMAL
GLUCOSE URINE, POC: ABNORMAL
KETONES, POC: ABNORMAL
LEUKOCYTE EST, POC: ABNORMAL
NITRITE, POC: ABNORMAL
PH, POC: 6
PROTEIN, POC: ABNORMAL
SPECIFIC GRAVITY, POC: 1.03
UROBILINOGEN, POC: 0.2

## 2018-05-03 PROCEDURE — 4040F PNEUMOC VAC/ADMIN/RCVD: CPT | Performed by: NURSE PRACTITIONER

## 2018-05-03 PROCEDURE — 99214 OFFICE O/P EST MOD 30 MIN: CPT | Performed by: NURSE PRACTITIONER

## 2018-05-03 PROCEDURE — G8598 ASA/ANTIPLAT THER USED: HCPCS | Performed by: NURSE PRACTITIONER

## 2018-05-03 PROCEDURE — 3017F COLORECTAL CA SCREEN DOC REV: CPT | Performed by: NURSE PRACTITIONER

## 2018-05-03 PROCEDURE — 4004F PT TOBACCO SCREEN RCVD TLK: CPT | Performed by: NURSE PRACTITIONER

## 2018-05-03 PROCEDURE — G8417 CALC BMI ABV UP PARAM F/U: HCPCS | Performed by: NURSE PRACTITIONER

## 2018-05-03 PROCEDURE — 1123F ACP DISCUSS/DSCN MKR DOCD: CPT | Performed by: NURSE PRACTITIONER

## 2018-05-03 PROCEDURE — 81002 URINALYSIS NONAUTO W/O SCOPE: CPT | Performed by: NURSE PRACTITIONER

## 2018-05-03 PROCEDURE — G8427 DOCREV CUR MEDS BY ELIG CLIN: HCPCS | Performed by: NURSE PRACTITIONER

## 2018-05-03 ASSESSMENT — ENCOUNTER SYMPTOMS
NAUSEA: 0
VOMITING: 0
COUGH: 0
SHORTNESS OF BREATH: 0
SORE THROAT: 0
ABDOMINAL PAIN: 0
EYE PAIN: 0

## 2018-05-04 ENCOUNTER — HOSPITAL ENCOUNTER (OUTPATIENT)
Dept: OTHER | Age: 73
Discharge: HOME | End: 2018-05-04
Attending: NURSE PRACTITIONER | Admitting: NURSE PRACTITIONER

## 2018-05-04 DIAGNOSIS — I25.119 CORONARY ARTERY DISEASE INVOLVING NATIVE HEART WITH ANGINA PECTORIS, UNSPECIFIED VESSEL OR LESION TYPE (HCC): ICD-10-CM

## 2018-05-04 DIAGNOSIS — Z79.899 OTHER LONG TERM (CURRENT) DRUG THERAPY: ICD-10-CM

## 2018-05-04 DIAGNOSIS — I10 ESSENTIAL HYPERTENSION: ICD-10-CM

## 2018-05-04 DIAGNOSIS — Z01.818 PREOP TESTING: ICD-10-CM

## 2018-05-04 DIAGNOSIS — M79.605 PAIN IN LEFT LEG: ICD-10-CM

## 2018-05-04 LAB
ANION GAP SERPL CALCULATED.3IONS-SCNC: 10 MMOL/L (ref 3–16)
APTT: 26.1 SEC (ref 23–28.6)
BUN BLDV-MCNC: 23 MG/DL (ref 6–20)
CALCIUM SERPL-MCNC: 9.9 MG/DL (ref 8.5–10.5)
CHLORIDE BLD-SCNC: 105 MMOL/L (ref 98–107)
CO2: 26 MMOL/L (ref 20–30)
CREAT SERPL-MCNC: 1.6 MG/DL (ref 0.4–1.2)
GFR AFRICAN AMERICAN: 52
GFR NON-AFRICAN AMERICAN: 43
GLUCOSE BLD-MCNC: 95 MG/DL (ref 74–106)
HBA1C MFR BLD: 5.3 %
HCT VFR BLD CALC: 44.6 % (ref 40–54)
HEMOGLOBIN: 14.1 G/DL (ref 13–18)
INR BLD: 1 (ref 0.86–1.14)
MCH RBC QN AUTO: 30.5 PG (ref 27–32)
MCHC RBC AUTO-ENTMCNC: 31.6 G/DL (ref 31–35)
MCV RBC AUTO: 96.5 FL (ref 80–100)
PDW BLD-RTO: 13.2 % (ref 11–16)
PLATELET # BLD: 261 K/UL (ref 150–400)
PMV BLD AUTO: 9.7 FL (ref 6–10)
POTASSIUM SERPL-SCNC: 4.8 MMOL/L (ref 3.4–5.1)
PROTHROMBIN TIME: 10.2 SEC (ref 9.5–10.9)
RBC # BLD: 4.62 M/UL (ref 4.5–6)
SODIUM BLD-SCNC: 141 MMOL/L (ref 136–145)
WBC # BLD: 10.7 K/UL (ref 4–11)

## 2018-05-07 ENCOUNTER — TELEPHONE (OUTPATIENT)
Dept: PRIMARY CARE CLINIC | Age: 73
End: 2018-05-07

## 2018-05-07 DIAGNOSIS — N28.9 ABNORMAL KIDNEY FUNCTION: Primary | ICD-10-CM

## 2018-06-07 ENCOUNTER — OFFICE VISIT (OUTPATIENT)
Dept: PRIMARY CARE CLINIC | Age: 73
End: 2018-06-07
Payer: MEDICARE

## 2018-06-07 VITALS
SYSTOLIC BLOOD PRESSURE: 120 MMHG | RESPIRATION RATE: 20 BRPM | WEIGHT: 213 LBS | HEIGHT: 72 IN | HEART RATE: 71 BPM | BODY MASS INDEX: 28.85 KG/M2 | OXYGEN SATURATION: 97 % | DIASTOLIC BLOOD PRESSURE: 76 MMHG

## 2018-06-07 DIAGNOSIS — H69.82 EUSTACHIAN TUBE DYSFUNCTION, LEFT: ICD-10-CM

## 2018-06-07 DIAGNOSIS — H66.002 ACUTE SUPPURATIVE OTITIS MEDIA OF LEFT EAR WITHOUT SPONTANEOUS RUPTURE OF TYMPANIC MEMBRANE, RECURRENCE NOT SPECIFIED: Primary | ICD-10-CM

## 2018-06-07 PROCEDURE — 3017F COLORECTAL CA SCREEN DOC REV: CPT | Performed by: PEDIATRICS

## 2018-06-07 PROCEDURE — G8598 ASA/ANTIPLAT THER USED: HCPCS | Performed by: PEDIATRICS

## 2018-06-07 PROCEDURE — 4040F PNEUMOC VAC/ADMIN/RCVD: CPT | Performed by: PEDIATRICS

## 2018-06-07 PROCEDURE — G8417 CALC BMI ABV UP PARAM F/U: HCPCS | Performed by: PEDIATRICS

## 2018-06-07 PROCEDURE — 1123F ACP DISCUSS/DSCN MKR DOCD: CPT | Performed by: PEDIATRICS

## 2018-06-07 PROCEDURE — 4004F PT TOBACCO SCREEN RCVD TLK: CPT | Performed by: PEDIATRICS

## 2018-06-07 PROCEDURE — G8427 DOCREV CUR MEDS BY ELIG CLIN: HCPCS | Performed by: PEDIATRICS

## 2018-06-07 PROCEDURE — 99213 OFFICE O/P EST LOW 20 MIN: CPT | Performed by: PEDIATRICS

## 2018-06-07 RX ORDER — PREDNISONE 10 MG/1
10 TABLET ORAL DAILY
Qty: 5 TABLET | Refills: 0 | Status: SHIPPED | OUTPATIENT
Start: 2018-06-07 | End: 2018-06-12

## 2018-06-07 RX ORDER — AMOXICILLIN AND CLAVULANATE POTASSIUM 875; 125 MG/1; MG/1
1 TABLET, FILM COATED ORAL EVERY 12 HOURS
Qty: 20 TABLET | Refills: 0 | Status: SHIPPED | OUTPATIENT
Start: 2018-06-07 | End: 2018-06-17

## 2018-06-07 ASSESSMENT — ENCOUNTER SYMPTOMS
EYE DISCHARGE: 0
SHORTNESS OF BREATH: 0
BACK PAIN: 0
ABDOMINAL PAIN: 0
SINUS PRESSURE: 0
WHEEZING: 0
VOMITING: 0
COUGH: 0
NAUSEA: 0

## 2018-07-05 RX ORDER — LISINOPRIL 10 MG/1
10 TABLET ORAL DAILY
Qty: 90 TABLET | Refills: 3 | Status: SHIPPED | OUTPATIENT
Start: 2018-07-05 | End: 2019-07-19 | Stop reason: SDUPTHER

## 2018-09-04 DIAGNOSIS — I25.10 CORONARY ARTERY DISEASE INVOLVING NATIVE CORONARY ARTERY OF NATIVE HEART WITHOUT ANGINA PECTORIS: ICD-10-CM

## 2018-09-04 RX ORDER — PANTOPRAZOLE SODIUM 20 MG/1
20 TABLET, DELAYED RELEASE ORAL DAILY
Qty: 30 TABLET | Refills: 3 | Status: SHIPPED | OUTPATIENT
Start: 2018-09-04 | End: 2019-02-11 | Stop reason: SDUPTHER

## 2018-09-04 RX ORDER — ROSUVASTATIN CALCIUM 40 MG/1
40 TABLET, COATED ORAL EVERY EVENING
Qty: 30 TABLET | Refills: 3 | Status: SHIPPED | OUTPATIENT
Start: 2018-09-04 | End: 2019-01-31 | Stop reason: SDUPTHER

## 2018-09-05 ENCOUNTER — OFFICE VISIT (OUTPATIENT)
Dept: CARDIOLOGY | Facility: CLINIC | Age: 73
End: 2018-09-05

## 2018-09-05 VITALS
SYSTOLIC BLOOD PRESSURE: 130 MMHG | BODY MASS INDEX: 28.79 KG/M2 | HEIGHT: 72 IN | DIASTOLIC BLOOD PRESSURE: 82 MMHG | WEIGHT: 212.6 LBS | HEART RATE: 62 BPM

## 2018-09-05 DIAGNOSIS — E78.5 DYSLIPIDEMIA: ICD-10-CM

## 2018-09-05 DIAGNOSIS — I25.10 CORONARY ARTERY DISEASE INVOLVING NATIVE CORONARY ARTERY OF NATIVE HEART WITHOUT ANGINA PECTORIS: Primary | ICD-10-CM

## 2018-09-05 DIAGNOSIS — I73.9 CLAUDICATION (HCC): ICD-10-CM

## 2018-09-05 DIAGNOSIS — I10 ESSENTIAL HYPERTENSION: ICD-10-CM

## 2018-09-05 DIAGNOSIS — Z72.0 TOBACCO ABUSE: ICD-10-CM

## 2018-09-05 DIAGNOSIS — I65.23 BILATERAL CAROTID ARTERY STENOSIS: ICD-10-CM

## 2018-09-05 PROCEDURE — 99406 BEHAV CHNG SMOKING 3-10 MIN: CPT | Performed by: INTERNAL MEDICINE

## 2018-09-05 PROCEDURE — 99214 OFFICE O/P EST MOD 30 MIN: CPT | Performed by: INTERNAL MEDICINE

## 2018-09-05 RX ORDER — ROSUVASTATIN CALCIUM 40 MG/1
40 TABLET, COATED ORAL
COMMUNITY
Start: 2018-09-04 | End: 2018-09-05 | Stop reason: SDUPTHER

## 2018-09-05 RX ORDER — ONDANSETRON 4 MG/1
4 TABLET, FILM COATED ORAL EVERY 8 HOURS PRN
Refills: 0 | COMMUNITY
Start: 2018-06-16 | End: 2020-04-29

## 2018-09-05 RX ORDER — ROSUVASTATIN CALCIUM 40 MG/1
40 TABLET, COATED ORAL NIGHTLY
COMMUNITY
Start: 2018-09-04 | End: 2022-12-02 | Stop reason: SDUPTHER

## 2018-09-05 RX ORDER — PANTOPRAZOLE SODIUM 20 MG/1
20 TABLET, DELAYED RELEASE ORAL DAILY
COMMUNITY
Start: 2018-09-04 | End: 2020-04-29

## 2018-09-20 ENCOUNTER — TELEPHONE (OUTPATIENT)
Dept: CARDIOLOGY | Facility: CLINIC | Age: 73
End: 2018-09-20

## 2018-12-14 RX ORDER — CARVEDILOL 12.5 MG/1
TABLET ORAL
Qty: 60 TABLET | Refills: 11 | Status: SHIPPED | OUTPATIENT
Start: 2018-12-14 | End: 2020-01-06 | Stop reason: SDUPTHER

## 2019-01-31 DIAGNOSIS — I25.10 CORONARY ARTERY DISEASE INVOLVING NATIVE CORONARY ARTERY OF NATIVE HEART WITHOUT ANGINA PECTORIS: ICD-10-CM

## 2019-01-31 RX ORDER — ROSUVASTATIN CALCIUM 40 MG/1
40 TABLET, COATED ORAL EVERY EVENING
Qty: 30 TABLET | Refills: 3 | Status: SHIPPED | OUTPATIENT
Start: 2019-01-31 | End: 2019-07-01 | Stop reason: SDUPTHER

## 2019-02-11 RX ORDER — PANTOPRAZOLE SODIUM 20 MG/1
20 TABLET, DELAYED RELEASE ORAL DAILY
Qty: 30 TABLET | Refills: 5 | Status: SHIPPED | OUTPATIENT
Start: 2019-02-11 | End: 2019-06-10 | Stop reason: SDUPTHER

## 2019-06-10 RX ORDER — PANTOPRAZOLE SODIUM 20 MG/1
20 TABLET, DELAYED RELEASE ORAL DAILY
Qty: 30 TABLET | Refills: 5 | Status: SHIPPED | OUTPATIENT
Start: 2019-06-10 | End: 2020-02-19 | Stop reason: ALTCHOICE

## 2019-07-19 RX ORDER — LISINOPRIL 10 MG/1
TABLET ORAL
Qty: 90 TABLET | Refills: 0 | Status: SHIPPED | OUTPATIENT
Start: 2019-07-19 | End: 2020-01-06 | Stop reason: SDUPTHER

## 2019-08-20 ENCOUNTER — HOSPITAL ENCOUNTER (OUTPATIENT)
Facility: HOSPITAL | Age: 74
Discharge: HOME | End: 2019-08-20
Payer: MEDICARE

## 2019-08-20 ENCOUNTER — OFFICE VISIT (OUTPATIENT)
Dept: PRIMARY CARE CLINIC | Age: 74
End: 2019-08-20
Payer: MEDICARE

## 2019-08-20 VITALS
OXYGEN SATURATION: 99 % | HEIGHT: 72 IN | BODY MASS INDEX: 28.01 KG/M2 | HEART RATE: 72 BPM | DIASTOLIC BLOOD PRESSURE: 70 MMHG | SYSTOLIC BLOOD PRESSURE: 120 MMHG | WEIGHT: 206.8 LBS

## 2019-08-20 DIAGNOSIS — I25.10 CORONARY ARTERY DISEASE INVOLVING NATIVE CORONARY ARTERY OF NATIVE HEART WITHOUT ANGINA PECTORIS: ICD-10-CM

## 2019-08-20 DIAGNOSIS — E78.5 HYPERLIPIDEMIA, UNSPECIFIED HYPERLIPIDEMIA TYPE: Primary | ICD-10-CM

## 2019-08-20 DIAGNOSIS — E78.5 HYPERLIPIDEMIA, UNSPECIFIED HYPERLIPIDEMIA TYPE: ICD-10-CM

## 2019-08-20 DIAGNOSIS — I10 ESSENTIAL HYPERTENSION: ICD-10-CM

## 2019-08-20 DIAGNOSIS — L98.9 SKIN LESION OF FACE: ICD-10-CM

## 2019-08-20 LAB
A/G RATIO: 1.5 (ref 0.8–2)
ALBUMIN SERPL-MCNC: 4.4 G/DL (ref 3.4–4.8)
ALP BLD-CCNC: 71 U/L (ref 25–100)
ALT SERPL-CCNC: 14 U/L (ref 4–36)
ANION GAP SERPL CALCULATED.3IONS-SCNC: 11 MMOL/L (ref 3–16)
AST SERPL-CCNC: 16 U/L (ref 8–33)
BILIRUB SERPL-MCNC: 0.5 MG/DL (ref 0.3–1.2)
BUN BLDV-MCNC: 21 MG/DL (ref 6–20)
CALCIUM SERPL-MCNC: 9.8 MG/DL (ref 8.5–10.5)
CHLORIDE BLD-SCNC: 102 MMOL/L (ref 98–107)
CHOLESTEROL, TOTAL: 141 MG/DL (ref 0–200)
CO2: 26 MMOL/L (ref 20–30)
CREAT SERPL-MCNC: 1.4 MG/DL (ref 0.4–1.2)
GFR AFRICAN AMERICAN: >59
GFR NON-AFRICAN AMERICAN: 50
GLOBULIN: 3 G/DL
GLUCOSE BLD-MCNC: 97 MG/DL (ref 74–106)
HDLC SERPL-MCNC: 41 MG/DL (ref 40–60)
LDL CHOLESTEROL CALCULATED: 70 MG/DL
POTASSIUM SERPL-SCNC: 4.7 MMOL/L (ref 3.4–5.1)
SODIUM BLD-SCNC: 139 MMOL/L (ref 136–145)
TOTAL PROTEIN: 7.4 G/DL (ref 6.4–8.3)
TRIGL SERPL-MCNC: 148 MG/DL (ref 0–249)
VLDLC SERPL CALC-MCNC: 30 MG/DL

## 2019-08-20 PROCEDURE — 1123F ACP DISCUSS/DSCN MKR DOCD: CPT | Performed by: NURSE PRACTITIONER

## 2019-08-20 PROCEDURE — 4004F PT TOBACCO SCREEN RCVD TLK: CPT | Performed by: NURSE PRACTITIONER

## 2019-08-20 PROCEDURE — 3017F COLORECTAL CA SCREEN DOC REV: CPT | Performed by: NURSE PRACTITIONER

## 2019-08-20 PROCEDURE — 36415 COLL VENOUS BLD VENIPUNCTURE: CPT

## 2019-08-20 PROCEDURE — G8598 ASA/ANTIPLAT THER USED: HCPCS | Performed by: NURSE PRACTITIONER

## 2019-08-20 PROCEDURE — 99213 OFFICE O/P EST LOW 20 MIN: CPT | Performed by: NURSE PRACTITIONER

## 2019-08-20 PROCEDURE — 4040F PNEUMOC VAC/ADMIN/RCVD: CPT | Performed by: NURSE PRACTITIONER

## 2019-08-20 PROCEDURE — 80053 COMPREHEN METABOLIC PANEL: CPT

## 2019-08-20 PROCEDURE — G8419 CALC BMI OUT NRM PARAM NOF/U: HCPCS | Performed by: NURSE PRACTITIONER

## 2019-08-20 PROCEDURE — 80061 LIPID PANEL: CPT

## 2019-08-20 PROCEDURE — G8427 DOCREV CUR MEDS BY ELIG CLIN: HCPCS | Performed by: NURSE PRACTITIONER

## 2019-08-20 ASSESSMENT — ENCOUNTER SYMPTOMS
SORE THROAT: 0
EYE PAIN: 0
SHORTNESS OF BREATH: 0
COUGH: 0
VOMITING: 0
ABDOMINAL PAIN: 0
NAUSEA: 0

## 2019-08-20 ASSESSMENT — PATIENT HEALTH QUESTIONNAIRE - PHQ9
SUM OF ALL RESPONSES TO PHQ QUESTIONS 1-9: 0
2. FEELING DOWN, DEPRESSED OR HOPELESS: 0
SUM OF ALL RESPONSES TO PHQ9 QUESTIONS 1 & 2: 0
1. LITTLE INTEREST OR PLEASURE IN DOING THINGS: 0
SUM OF ALL RESPONSES TO PHQ QUESTIONS 1-9: 0

## 2019-09-20 ENCOUNTER — OFFICE VISIT (OUTPATIENT)
Dept: CARDIOLOGY | Facility: CLINIC | Age: 74
End: 2019-09-20

## 2019-09-20 VITALS
DIASTOLIC BLOOD PRESSURE: 80 MMHG | SYSTOLIC BLOOD PRESSURE: 142 MMHG | WEIGHT: 213.6 LBS | HEART RATE: 65 BPM | OXYGEN SATURATION: 95 % | HEIGHT: 72 IN | BODY MASS INDEX: 28.93 KG/M2

## 2019-09-20 DIAGNOSIS — I73.9 CLAUDICATION (HCC): ICD-10-CM

## 2019-09-20 DIAGNOSIS — Z72.0 TOBACCO ABUSE: ICD-10-CM

## 2019-09-20 DIAGNOSIS — I65.23 BILATERAL CAROTID ARTERY STENOSIS: ICD-10-CM

## 2019-09-20 DIAGNOSIS — I35.0 AORTIC VALVE STENOSIS, ETIOLOGY OF CARDIAC VALVE DISEASE UNSPECIFIED: ICD-10-CM

## 2019-09-20 DIAGNOSIS — I25.10 CORONARY ARTERY DISEASE INVOLVING NATIVE CORONARY ARTERY OF NATIVE HEART WITHOUT ANGINA PECTORIS: Primary | ICD-10-CM

## 2019-09-20 DIAGNOSIS — I10 ESSENTIAL HYPERTENSION: ICD-10-CM

## 2019-09-20 DIAGNOSIS — E78.5 DYSLIPIDEMIA: ICD-10-CM

## 2019-09-20 PROCEDURE — 99214 OFFICE O/P EST MOD 30 MIN: CPT | Performed by: INTERNAL MEDICINE

## 2019-09-20 PROCEDURE — 99406 BEHAV CHNG SMOKING 3-10 MIN: CPT | Performed by: INTERNAL MEDICINE

## 2019-09-20 NOTE — PROGRESS NOTES
[facility] AT [address], [address]  [place], [place], [address] [phone]  [facility]           OUTPATIENT CLINIC FOLLOW UP NOTE    Patient Care Team:  Patient Care Team:  Jeanne Winston DO as PCP - General (Family Medicine)    Subjective:   Reason for consultation:   Chief Complaint   Patient presents with   • Follow-up       HPI:    Thor Ca is a 73 y.o. male.  Problem list:  1.  Chest pain, NSTEMI 9/2016 with intermediate LAD disease and no intervention.  Possible spontaneous revascularization  2.  Aortic stenosis  3.  Carotid stenosis  4.  Claudication  5.  Tobacco dependence    The patient presents today for follow-up.  The patient states that he has not had chest pain.  Denies symptoms of valvular heart disease such as volume overload, syncope.  Denies symptoms of severe carotid stenosis such as strokelike symptoms, amaurosis fugax.    Does have worsening left greater than right claudication-like symptoms.  Patient states that may be related to his back but the pains are localized to below the knee/calves and at times his toes go numb.    The patient thought maybe this was related to his statin, but after stopping his Crestor for a couple weeks, his symptoms did not change.      PFSH:  Patient Active Problem List   Diagnosis   • Coronary artery disease involving native coronary artery of native heart without angina pectoris   • Essential hypertension   • Tobacco abuse   • Dyslipidemia   • Bilateral carotid artery stenosis   • Claudication (CMS/[facility])         Current Outpatient Medications:   •  aspirin 81 MG tablet, Take 2 tablets by mouth daily., Disp: 30 tablet, Rfl: 1  •  carvedilol (COREG) 12.5 MG tablet, take 1 tablet by mouth every 12 hours, Disp: 60 tablet, Rfl: 11  •  lisinopril (PRINIVIL,ZESTRIL) 10 MG tablet, take 1 tablet by mouth once daily, Disp: 90 tablet, Rfl: 0  •  ondansetron (ZOFRAN) 4 MG tablet, Take 4 mg by mouth Every 8  "(Eight) Hours As Needed. for nausea, Disp: , Rfl: 0  •  pantoprazole (PROTONIX) 20 MG EC tablet, Daily., Disp: , Rfl:   •  sertraline (ZOLOFT) 50 MG tablet, Daily., Disp: , Rfl: 0  •  rosuvastatin (CRESTOR) 40 MG tablet, Daily., Disp: , Rfl:     No Known Allergies     reports that he has been smoking cigarettes.  He has a 25.00 pack-year smoking history. He has never used smokeless tobacco.    Family History   Problem Relation Age of Onset   • Heart disease Mother    • Diabetes Mother        Review of Systems  Negative for exertional chest pain, dyspnea with exertion, orthopnea, PND, lower extremity edema, palpitations, lightheadedness, syncope.       Objective:   Blood pressure 142/80, pulse 65, height 182.9 cm (72\"), weight 96.9 kg (213 lb 9.6 oz), SpO2 95 %.    CONSTITUTIONAL: No acute distress, normal affect  RESPIRATORY: Normal effort. Clear to auscultation bilaterally without wheezing or rales  CARDIOVASCULAR: Carotid bruit right side. Regular rate and rhythm with normal S1 and S2. Without gallop or rub. CHAR at RUSB without radiation  PERIPHERAL VASCULAR: Normal radial pulses bilaterally.  0+ left DP pulse.  2+ right DP pulse.  No significant lower extremity swelling    Labs:  Lab Results   Component Value Date    ALT 29 09/16/2016    AST 38 (H) 09/16/2016     Lab Results   Component Value Date    CHOL 179 09/16/2016    TRIG 148 08/20/2019    HDL 41 08/20/2019    CREATININE 0.90 09/17/2016       Diagnostic Data:    Procedures  Coronary Angiogram 9/2016  Mild to moderate 2 vessel CAD involving the LAD and Ramus, no culprit lesion or functionally significant stenosis was found for intervention. Borderline 50% mid LAD stenosis with 0.91 FFR. The patient possibly had a ruptured plaque that has since revascularized.    Carotid Duplex US 9/2016  · Proximal right internal carotid artery stenosis of 50-69%.  · Proximal left internal carotid artery stenosis of 0-49%.    TTE 9/2016  · Left ventricular function is " normal. Estimated EF = 64%.  · Left ventricular diastolic dysfunction (grade I a) consistent with impaired relaxation.  · Left ventricular wall thickness is consistent with mild concentric hypertrophy.  · All left ventricular wall segments contract normally.  · Mild aortic valve stenosis is present.  · There is no evidence of pericardial effusion.  · Estimated right ventricular systolic pressure from tricuspid regurgitation is normal (<35 mmHg).    Assessment and Plan:   Coronary artery disease involving native coronary artery of native heart without angina pectoris  -CCS 0, NSTEMI 9/2016, negative iFR of intermediate LAD disease, no PCI, EF normal  -Continue ASA, carvedilol, statin     Claudication  -Now with Ashli class II-III symptoms  -Exercise ABIs  -Encouraged tobacco cessation and increased walking programs  -Continue aspirin and statin.    Aortic stenosis  -Repeat echocardiogram to reassess stenosis    Bruit of right carotid artery  -Moderate disease as of 2016  -Repeat carotid duplex ultrasound  -ASA, statin    Dyslipidemia  -Myalgias when on atorvastatin  -Continue rosuvastatin, seems to be tolerating it well.  Trialed coming off of it for couple weeks and his claudication-like symptoms persisted.  -LDL 70, 8/2019    Essential hypertension  -Carvedilol, lisinopril     Tobacco abuse  -Smoking 0.5 ppd, used nicotine patch winter 2016  -Again recommended cessation.  Discussed the risks of continued tobacco/nicotine exposure to his heart, carotid stenosis, and claudication-like symptoms   -A total of 4 minutes was spent on this topic    -Return in about 6 months (around 3/20/2020).    Bill Ward MD, MSc, [facility]  Interventional Cardiology  [facility] at [facility]

## 2019-09-24 ENCOUNTER — HOSPITAL ENCOUNTER (OUTPATIENT)
Dept: NON INVASIVE DIAGNOSTICS | Facility: HOSPITAL | Age: 74
Discharge: HOME | End: 2019-09-24
Payer: MEDICARE

## 2019-09-24 LAB
LV EF: 65 %
LVEF MODALITY: NORMAL

## 2019-09-24 PROCEDURE — 93306 TTE W/DOPPLER COMPLETE: CPT

## 2019-10-07 ENCOUNTER — HOSPITAL ENCOUNTER (OUTPATIENT)
Dept: ULTRASOUND IMAGING | Facility: HOSPITAL | Age: 74
Discharge: HOME | End: 2019-10-07
Payer: MEDICARE

## 2019-10-07 DIAGNOSIS — I65.23 BILATERAL CAROTID ARTERY STENOSIS: ICD-10-CM

## 2019-10-07 DIAGNOSIS — I73.9 CLAUDICATION (HCC): ICD-10-CM

## 2019-10-07 PROCEDURE — 93925 LOWER EXTREMITY STUDY: CPT

## 2019-10-07 PROCEDURE — 93880 EXTRACRANIAL BILAT STUDY: CPT

## 2019-10-10 ENCOUNTER — TELEPHONE (OUTPATIENT)
Dept: CARDIOLOGY | Facility: CLINIC | Age: 74
End: 2019-10-10

## 2019-10-10 DIAGNOSIS — I73.9 CLAUDICATION (HCC): Primary | ICD-10-CM

## 2019-10-10 NOTE — TELEPHONE ENCOUNTER
Patient contacted to review test results. Patient to have peripheral angiogram per [facility]. Left message with patients wife detailing recommendations. Will pass along to patient. Advised wife to have patient call back with any questions or to discuss further.

## 2019-10-10 NOTE — TELEPHONE ENCOUNTER
----- Message from Bill Ward MD sent at 10/10/2019 12:51 PM EDT -----  A couple things on this patient.  First, can you let him know his carotid disease looks stable when compared to 2016.  He still has moderate disease on the right, mild on the left.  Secondly, his ABIs showed moderate disease on the left, only mild on the right.  I I would recommend a peripheral angiogram with possible intervention.  Still waiting on his echo results.  I do not recall if I read it when we were last in [place] on 10/4.  Lastly, can you see if he has had a more recent creatinine level elsewhere since his last on with us in 2016?  Thanks

## 2019-10-10 NOTE — TELEPHONE ENCOUNTER
----- Message from Bill Ward MD sent at 10/10/2019 12:51 PM EDT -----  A couple things on this patient.  First, can you let him know his carotid disease looks stable when compared to 2016.  He still has moderate disease on the right, mild on the left.  Secondly, his ABIs showed moderate disease on the left, only mild on the right.  I I would recommend a peripheral angiogram with possible intervention.  Still waiting on his echo results.  I do not recall if I read it when we were last in [place] on 10/4.  Lastly, can you see if he has had a more recent creatinine level elsewhere since his last on with us in 2016?  Thanks

## 2019-10-10 NOTE — TELEPHONE ENCOUNTER
Patient contacted to review results and recommendations per MJS. Let message with family member to have patient return call. Will await.

## 2019-10-14 ENCOUNTER — PREP FOR SURGERY (OUTPATIENT)
Dept: OTHER | Facility: HOSPITAL | Age: 74
End: 2019-10-14

## 2019-10-14 DIAGNOSIS — I73.9 CLAUDICATION (HCC): Primary | ICD-10-CM

## 2019-10-14 RX ORDER — SODIUM CHLORIDE 0.9 % (FLUSH) 0.9 %
3 SYRINGE (ML) INJECTION EVERY 12 HOURS SCHEDULED
Status: CANCELLED | OUTPATIENT
Start: 2019-10-14

## 2019-10-14 RX ORDER — ONDANSETRON 2 MG/ML
4 INJECTION INTRAMUSCULAR; INTRAVENOUS EVERY 8 HOURS PRN
Status: CANCELLED | OUTPATIENT
Start: 2019-10-14

## 2019-10-14 RX ORDER — ASPIRIN 325 MG
325 TABLET, DELAYED RELEASE (ENTERIC COATED) ORAL DAILY
Status: CANCELLED | OUTPATIENT
Start: 2019-10-15

## 2019-10-14 RX ORDER — NITROGLYCERIN 0.4 MG/1
0.4 TABLET SUBLINGUAL
Status: CANCELLED | OUTPATIENT
Start: 2019-10-14

## 2019-10-14 RX ORDER — ASPIRIN 325 MG
325 TABLET ORAL ONCE
Status: CANCELLED | OUTPATIENT
Start: 2019-10-14 | End: 2019-10-14

## 2019-10-14 RX ORDER — SODIUM CHLORIDE 0.9 % (FLUSH) 0.9 %
10 SYRINGE (ML) INJECTION AS NEEDED
Status: CANCELLED | OUTPATIENT
Start: 2019-10-14

## 2019-10-17 ENCOUNTER — HOSPITAL ENCOUNTER (OUTPATIENT)
Facility: HOSPITAL | Age: 74
Discharge: HOME | End: 2019-10-18
Attending: INTERNAL MEDICINE | Admitting: INTERNAL MEDICINE

## 2019-10-17 DIAGNOSIS — I73.9 CLAUDICATION (HCC): ICD-10-CM

## 2019-10-17 DIAGNOSIS — I73.9 PERIPHERAL VASCULAR DISEASE WITH CLAUDICATION (HCC): Primary | ICD-10-CM

## 2019-10-17 PROBLEM — N18.30 CKD (CHRONIC KIDNEY DISEASE) STAGE 3, GFR 30-59 ML/MIN (HCC): Status: ACTIVE | Noted: 2019-10-17

## 2019-10-17 LAB
ACT BLD: 153 SECONDS (ref 82–152)
ANION GAP SERPL CALCULATED.3IONS-SCNC: 9 MMOL/L (ref 5–15)
BUN BLD-MCNC: 28 MG/DL (ref 8–23)
BUN/CREAT SERPL: 19.4 (ref 7–25)
CALCIUM SPEC-SCNC: 9.4 MG/DL (ref 8.6–10.5)
CHLORIDE SERPL-SCNC: 108 MMOL/L (ref 98–107)
CHOLEST SERPL-MCNC: 233 MG/DL (ref 0–200)
CO2 SERPL-SCNC: 22 MMOL/L (ref 22–29)
CREAT BLD-MCNC: 1.44 MG/DL (ref 0.76–1.27)
DEPRECATED RDW RBC AUTO: 43.5 FL (ref 37–54)
ERYTHROCYTE [DISTWIDTH] IN BLOOD BY AUTOMATED COUNT: 12.2 % (ref 12.3–15.4)
GFR SERPL CREATININE-BSD FRML MDRD: 48 ML/MIN/1.73
GLUCOSE BLD-MCNC: 96 MG/DL (ref 65–99)
HBA1C MFR BLD: 5.4 % (ref 4.8–5.6)
HCT VFR BLD AUTO: 43.5 % (ref 37.5–51)
HDLC SERPL-MCNC: 46 MG/DL (ref 40–60)
HGB BLD-MCNC: 13.8 G/DL (ref 13–17.7)
LDLC SERPL CALC-MCNC: 157 MG/DL (ref 0–100)
LDLC/HDLC SERPL: 3.42 {RATIO}
MCH RBC QN AUTO: 30.6 PG (ref 26.6–33)
MCHC RBC AUTO-ENTMCNC: 31.7 G/DL (ref 31.5–35.7)
MCV RBC AUTO: 96.5 FL (ref 79–97)
PLATELET # BLD AUTO: 218 10*3/MM3 (ref 140–450)
PMV BLD AUTO: 10.1 FL (ref 6–12)
POTASSIUM BLD-SCNC: 4.7 MMOL/L (ref 3.5–5.2)
RBC # BLD AUTO: 4.51 10*6/MM3 (ref 4.14–5.8)
SODIUM BLD-SCNC: 139 MMOL/L (ref 136–145)
TRIGL SERPL-MCNC: 149 MG/DL (ref 0–150)
VLDLC SERPL-MCNC: 29.8 MG/DL
WBC NRBC COR # BLD: 8.91 10*3/MM3 (ref 3.4–10.8)

## 2019-10-17 PROCEDURE — 75710 ARTERY X-RAYS ARM/LEG: CPT | Performed by: INTERNAL MEDICINE

## 2019-10-17 PROCEDURE — 80061 LIPID PANEL: CPT | Performed by: PHYSICIAN ASSISTANT

## 2019-10-17 PROCEDURE — 85347 COAGULATION TIME ACTIVATED: CPT

## 2019-10-17 PROCEDURE — 36245 INS CATH ABD/L-EXT ART 1ST: CPT | Performed by: INTERNAL MEDICINE

## 2019-10-17 PROCEDURE — 80048 BASIC METABOLIC PNL TOTAL CA: CPT | Performed by: INTERNAL MEDICINE

## 2019-10-17 PROCEDURE — 25010000002 ONDANSETRON PER 1 MG: Performed by: INTERNAL MEDICINE

## 2019-10-17 PROCEDURE — 36415 COLL VENOUS BLD VENIPUNCTURE: CPT

## 2019-10-17 PROCEDURE — A9270 NON-COVERED ITEM OR SERVICE: HCPCS | Performed by: INTERNAL MEDICINE

## 2019-10-17 PROCEDURE — 63710000001 CARVEDILOL 12.5 MG TABLET: Performed by: INTERNAL MEDICINE

## 2019-10-17 PROCEDURE — C1894 INTRO/SHEATH, NON-LASER: HCPCS | Performed by: INTERNAL MEDICINE

## 2019-10-17 PROCEDURE — C1769 GUIDE WIRE: HCPCS | Performed by: INTERNAL MEDICINE

## 2019-10-17 PROCEDURE — C1887 CATHETER, GUIDING: HCPCS | Performed by: INTERNAL MEDICINE

## 2019-10-17 PROCEDURE — 25010000002 HYDRALAZINE PER 20 MG: Performed by: INTERNAL MEDICINE

## 2019-10-17 PROCEDURE — 25010000002 FENTANYL CITRATE (PF) 100 MCG/2ML SOLUTION: Performed by: INTERNAL MEDICINE

## 2019-10-17 PROCEDURE — 25010000002 MIDAZOLAM PER 1 MG: Performed by: INTERNAL MEDICINE

## 2019-10-17 PROCEDURE — 25010000002 HEPARIN (PORCINE) PER 1000 UNITS: Performed by: INTERNAL MEDICINE

## 2019-10-17 PROCEDURE — 85027 COMPLETE CBC AUTOMATED: CPT | Performed by: INTERNAL MEDICINE

## 2019-10-17 PROCEDURE — 99153 MOD SED SAME PHYS/QHP EA: CPT | Performed by: INTERNAL MEDICINE

## 2019-10-17 PROCEDURE — 99152 MOD SED SAME PHYS/QHP 5/>YRS: CPT | Performed by: INTERNAL MEDICINE

## 2019-10-17 PROCEDURE — 0 IOPAMIDOL PER 1 ML: Performed by: INTERNAL MEDICINE

## 2019-10-17 PROCEDURE — 63710000001 ROSUVASTATIN 20 MG TABLET: Performed by: INTERNAL MEDICINE

## 2019-10-17 PROCEDURE — G0378 HOSPITAL OBSERVATION PER HR: HCPCS

## 2019-10-17 PROCEDURE — 83036 HEMOGLOBIN GLYCOSYLATED A1C: CPT | Performed by: PHYSICIAN ASSISTANT

## 2019-10-17 RX ORDER — ASPIRIN 325 MG
325 TABLET, DELAYED RELEASE (ENTERIC COATED) ORAL DAILY
Status: DISCONTINUED | OUTPATIENT
Start: 2019-10-18 | End: 2019-10-17 | Stop reason: HOSPADM

## 2019-10-17 RX ORDER — HEPARIN SODIUM 1000 [USP'U]/ML
INJECTION, SOLUTION INTRAVENOUS; SUBCUTANEOUS AS NEEDED
Status: DISCONTINUED | OUTPATIENT
Start: 2019-10-17 | End: 2019-10-17 | Stop reason: HOSPADM

## 2019-10-17 RX ORDER — PANTOPRAZOLE SODIUM 40 MG/1
40 TABLET, DELAYED RELEASE ORAL
Status: DISCONTINUED | OUTPATIENT
Start: 2019-10-18 | End: 2019-10-18 | Stop reason: HOSPADM

## 2019-10-17 RX ORDER — SODIUM CHLORIDE 0.9 % (FLUSH) 0.9 %
10 SYRINGE (ML) INJECTION AS NEEDED
Status: DISCONTINUED | OUTPATIENT
Start: 2019-10-17 | End: 2019-10-17 | Stop reason: HOSPADM

## 2019-10-17 RX ORDER — ROSUVASTATIN CALCIUM 20 MG/1
40 TABLET, COATED ORAL NIGHTLY
Status: DISCONTINUED | OUTPATIENT
Start: 2019-10-17 | End: 2019-10-18 | Stop reason: HOSPADM

## 2019-10-17 RX ORDER — LIDOCAINE HYDROCHLORIDE 10 MG/ML
INJECTION, SOLUTION EPIDURAL; INFILTRATION; INTRACAUDAL; PERINEURAL AS NEEDED
Status: DISCONTINUED | OUTPATIENT
Start: 2019-10-17 | End: 2019-10-17 | Stop reason: HOSPADM

## 2019-10-17 RX ORDER — ONDANSETRON 2 MG/ML
4 INJECTION INTRAMUSCULAR; INTRAVENOUS EVERY 8 HOURS PRN
Status: DISCONTINUED | OUTPATIENT
Start: 2019-10-17 | End: 2019-10-17 | Stop reason: HOSPADM

## 2019-10-17 RX ORDER — NITROGLYCERIN 0.4 MG/1
0.4 TABLET SUBLINGUAL
Status: DISCONTINUED | OUTPATIENT
Start: 2019-10-17 | End: 2019-10-17 | Stop reason: HOSPADM

## 2019-10-17 RX ORDER — SODIUM CHLORIDE 9 MG/ML
3 INJECTION, SOLUTION INTRAVENOUS CONTINUOUS
Status: ACTIVE | OUTPATIENT
Start: 2019-10-17 | End: 2019-10-17

## 2019-10-17 RX ORDER — ASPIRIN 81 MG/1
81 TABLET ORAL DAILY
Status: DISCONTINUED | OUTPATIENT
Start: 2019-10-18 | End: 2019-10-18 | Stop reason: HOSPADM

## 2019-10-17 RX ORDER — CARVEDILOL 12.5 MG/1
12.5 TABLET ORAL 2 TIMES DAILY WITH MEALS
Status: COMPLETED | OUTPATIENT
Start: 2019-10-17 | End: 2019-10-17

## 2019-10-17 RX ORDER — CARVEDILOL 12.5 MG/1
12.5 TABLET ORAL EVERY 12 HOURS SCHEDULED
Status: DISCONTINUED | OUTPATIENT
Start: 2019-10-17 | End: 2019-10-18 | Stop reason: HOSPADM

## 2019-10-17 RX ORDER — CILOSTAZOL 100 MG/1
100 TABLET ORAL 2 TIMES DAILY
Qty: 60 TABLET | Refills: 11 | Status: ON HOLD | OUTPATIENT
Start: 2019-10-17 | End: 2020-01-31

## 2019-10-17 RX ORDER — MIDAZOLAM HYDROCHLORIDE 1 MG/ML
INJECTION INTRAMUSCULAR; INTRAVENOUS AS NEEDED
Status: DISCONTINUED | OUTPATIENT
Start: 2019-10-17 | End: 2019-10-17 | Stop reason: HOSPADM

## 2019-10-17 RX ORDER — FENTANYL CITRATE 50 UG/ML
INJECTION, SOLUTION INTRAMUSCULAR; INTRAVENOUS AS NEEDED
Status: DISCONTINUED | OUTPATIENT
Start: 2019-10-17 | End: 2019-10-17 | Stop reason: HOSPADM

## 2019-10-17 RX ORDER — HYDRALAZINE HYDROCHLORIDE 20 MG/ML
10 INJECTION INTRAMUSCULAR; INTRAVENOUS ONCE
Status: COMPLETED | OUTPATIENT
Start: 2019-10-17 | End: 2019-10-17

## 2019-10-17 RX ORDER — SODIUM CHLORIDE 9 MG/ML
250 INJECTION, SOLUTION INTRAVENOUS ONCE AS NEEDED
Status: DISCONTINUED | OUTPATIENT
Start: 2019-10-17 | End: 2019-10-18 | Stop reason: HOSPADM

## 2019-10-17 RX ORDER — SODIUM CHLORIDE 9 MG/ML
INJECTION, SOLUTION INTRAVENOUS CONTINUOUS PRN
Status: COMPLETED | OUTPATIENT
Start: 2019-10-17 | End: 2019-10-17

## 2019-10-17 RX ORDER — LISINOPRIL 10 MG/1
10 TABLET ORAL DAILY
Status: DISCONTINUED | OUTPATIENT
Start: 2019-10-18 | End: 2019-10-18 | Stop reason: HOSPADM

## 2019-10-17 RX ORDER — ASPIRIN 325 MG
325 TABLET ORAL ONCE
Status: COMPLETED | OUTPATIENT
Start: 2019-10-17 | End: 2019-10-17

## 2019-10-17 RX ORDER — ONDANSETRON 2 MG/ML
4 INJECTION INTRAMUSCULAR; INTRAVENOUS EVERY 6 HOURS PRN
Status: DISCONTINUED | OUTPATIENT
Start: 2019-10-17 | End: 2019-10-18 | Stop reason: HOSPADM

## 2019-10-17 RX ORDER — SODIUM CHLORIDE 0.9 % (FLUSH) 0.9 %
3 SYRINGE (ML) INJECTION EVERY 12 HOURS SCHEDULED
Status: DISCONTINUED | OUTPATIENT
Start: 2019-10-17 | End: 2019-10-17 | Stop reason: HOSPADM

## 2019-10-17 RX ORDER — ONDANSETRON 4 MG/1
4 TABLET, FILM COATED ORAL EVERY 8 HOURS PRN
Status: DISCONTINUED | OUTPATIENT
Start: 2019-10-17 | End: 2019-10-18 | Stop reason: HOSPADM

## 2019-10-17 RX ADMIN — ASPIRIN 325 MG ORAL TABLET 325 MG: 325 PILL ORAL at 10:01

## 2019-10-17 RX ADMIN — ROSUVASTATIN CALCIUM 40 MG: 20 TABLET, COATED ORAL at 21:11

## 2019-10-17 RX ADMIN — SODIUM CHLORIDE 5 MG/HR: 9 INJECTION, SOLUTION INTRAVENOUS at 16:59

## 2019-10-17 RX ADMIN — CARVEDILOL 12.5 MG: 12.5 TABLET, FILM COATED ORAL at 15:22

## 2019-10-17 RX ADMIN — HYDRALAZINE HYDROCHLORIDE 10 MG: 20 INJECTION INTRAMUSCULAR; INTRAVENOUS at 15:23

## 2019-10-17 RX ADMIN — ONDANSETRON 4 MG: 2 INJECTION INTRAMUSCULAR; INTRAVENOUS at 18:25

## 2019-10-17 RX ADMIN — SODIUM CHLORIDE 1 ML/KG/HR: 9 INJECTION, SOLUTION INTRAVENOUS at 11:02

## 2019-10-17 NOTE — INTERVAL H&P NOTE
H&P updated. The patient was examined and the following changes are noted:  :      No changes to the physical exam    Active Hospital Problems    Diagnosis   • **Peripheral vascular disease with claudication (CMS/[facility])     · Ashli's class II-III reported 10/2019  · ABIs (10/7/2019): Moderate peripheral vascular disease.  Left greater than the right.  Arterial inflow lesion on the left not excluded     • CKD (chronic kidney disease) stage 3, GFR 30-59 ml/min (CMS/[facility])     · Creatinine 1.4, GFR 50 on 8/20/2019     • Tobacco abuse   • Dyslipidemia   • Coronary artery disease involving native coronary artery of native heart without angina pectoris     · Cardiac cath for non-STEMI (9/16/2016): Moderate two-vessel CAD involving LAD and ramus with no culprit lesion or functionally significant stenosis to account for non-STEMI.  Possibly had ruptured plaque that has since revascularized  · Echo (9/16/2016):LVEF = 64%.  Grade 1 diastolic dysfunction.  Mild aortic stenosis.  · Echo (10/4/2019): LVEF 65%.  Mild to moderate LVH.  No significant aortic stenosis or regurgitation          • Essential hypertension       Patient presents today to undergo an abdominal aortogram with runoffs and possible stent placement after undergoing an DAYSI study that suggests moderate peripheral vascular disease with the left greater than the right.  The patient has been experiencing Ashli class II-III claudication symptoms.  The risks and benefits of the procedure were discussed and the patient is agreeable to proceed.  Of note the patient has chronic kidney disease and most recent creatinine was 1.4 in August.  Current creatinine level pending.    Plan:  · If lab work acceptable proceed with abdominal aortogram with runoffs and possible stent placement via the right femoral approach  · Minimize contrast usage due to high risk of CHANEL  · Further recommendations to follow    Cathleen Crawford APRN

## 2019-10-17 NOTE — PLAN OF CARE
Problem: Patient Care Overview  Goal: Plan of Care Review  Outcome: Ongoing (interventions implemented as appropriate)   10/17/19 2896   Coping/Psychosocial   Plan of Care Reviewed With patient   Plan of Care Review   Progress no change   OTHER   Outcome Summary VSS, when patient first arrived to floor BP was elevated. Cardene drip started, which is now turned off. R arterial sheath pulled, patient became nauseated and sit up while pressure was being held hematoma popped up. Pressure was held on hematoma. Site is now covered with gauze and tegaderm and as of now no hematoma. Bedrest will be up at 0030. Will continue current plan of care.

## 2019-10-18 VITALS
DIASTOLIC BLOOD PRESSURE: 84 MMHG | HEART RATE: 63 BPM | TEMPERATURE: 97.6 F | HEIGHT: 75 IN | OXYGEN SATURATION: 94 % | WEIGHT: 208.11 LBS | BODY MASS INDEX: 25.88 KG/M2 | SYSTOLIC BLOOD PRESSURE: 155 MMHG | RESPIRATION RATE: 18 BRPM

## 2019-10-18 LAB — ACT BLD: 202 SECONDS (ref 82–152)

## 2019-10-18 PROCEDURE — 63710000001 SERTRALINE 50 MG TABLET: Performed by: INTERNAL MEDICINE

## 2019-10-18 PROCEDURE — G0378 HOSPITAL OBSERVATION PER HR: HCPCS

## 2019-10-18 PROCEDURE — 99225 PR SBSQ OBSERVATION CARE/DAY 25 MINUTES: CPT | Performed by: INTERNAL MEDICINE

## 2019-10-18 PROCEDURE — 99406 BEHAV CHNG SMOKING 3-10 MIN: CPT | Performed by: INTERNAL MEDICINE

## 2019-10-18 PROCEDURE — 63710000001 CARVEDILOL 12.5 MG TABLET: Performed by: INTERNAL MEDICINE

## 2019-10-18 PROCEDURE — 63710000001 LISINOPRIL 10 MG TABLET: Performed by: INTERNAL MEDICINE

## 2019-10-18 PROCEDURE — A9270 NON-COVERED ITEM OR SERVICE: HCPCS | Performed by: INTERNAL MEDICINE

## 2019-10-18 PROCEDURE — 63710000001 ASPIRIN 81 MG TABLET DELAYED-RELEASE: Performed by: INTERNAL MEDICINE

## 2019-10-18 PROCEDURE — 63710000001 PANTOPRAZOLE 40 MG TABLET DELAYED-RELEASE: Performed by: INTERNAL MEDICINE

## 2019-10-18 RX ORDER — VARENICLINE TARTRATE 1 MG/1
1 TABLET, FILM COATED ORAL 2 TIMES DAILY
Qty: 60 TABLET | Refills: 2 | Status: SHIPPED | OUTPATIENT
Start: 2019-11-15 | End: 2020-01-17

## 2019-10-18 RX ADMIN — CARVEDILOL 12.5 MG: 12.5 TABLET, FILM COATED ORAL at 08:30

## 2019-10-18 RX ADMIN — SERTRALINE HYDROCHLORIDE 50 MG: 50 TABLET ORAL at 08:30

## 2019-10-18 RX ADMIN — ASPIRIN 81 MG: 81 TABLET, COATED ORAL at 08:30

## 2019-10-18 RX ADMIN — LISINOPRIL 10 MG: 10 TABLET ORAL at 08:30

## 2019-10-18 RX ADMIN — PANTOPRAZOLE SODIUM 40 MG: 40 TABLET, DELAYED RELEASE ORAL at 06:57

## 2019-10-18 NOTE — DISCHARGE SUMMARY
[facility]  DISCHARGE SUMMARY    Admission Date: 10/17/2019       Date of Discharge:  10/18/2019    Discharge Diagnosis: PAD    Presenting Problem/History of Present Illness  Claudication (CMS/[facility]) [I73.9]  Claudication (CMS/[facility]) [I73.9]    Hospital Course  Patient is a 73 y.o. male presented with claudication.  Underwent an elective peripheral angiogram which revealed a left SFA chronic total occlusion.  Attempts at crossing the  percutaneously were unsuccessful.  The patient will be considered for retrograde percutaneous intervention versus a femoropopliteal bypass in the future.  In the meantime the patient will trial Pletal, was urged to quit smoking and started on Chantix, and participate in cardiac rehab/supervised exercise therapy for PAD.      Patient has committed to buy a blood pressure cuff.    Tobacco dependence: Total of 4 minutes was spent on urging the patient to quit tobacco products.  The risk of further PAD, CAD, carotid stenosis were discussed with the patient.  The patient is interesting in starting/trialing Chantix    Procedures Performed  Procedure(s):  Peripheral angiography       Consults:   Consults     No orders found for last 30 day(s).          Pertinent Test Results: See EPIC    Condition on Discharge:  Stable    Physical Exam at Discharge  Vital Signs  Temp:  [97 °F (36.1 °C)-98 °F (36.7 °C)] 97.6 °F (36.4 °C)  Heart Rate:  [52-65] 63  Resp:  [16-18] 18  BP: (139-214)/() 155/84  Physical Exam:  GEN: NAD  PULM: CTAB  CV: RRR, femoral artery clean dry and intact, nontender, ecchymosis present    Discharge Disposition: Home    Discharge Diet: Cardiac heart healthy diet    Activity at Discharge: As tolerated    Follow-up Appointments  Future Appointments   Date Time Provider Department Center   4/17/2020  2:45 PM Bill Ward MD [facility] None     Additional Instructions for the Follow-ups that You Need to Schedule     Discharge Follow-up  with Specialty: Change follow up with Dr Ward from 4/2020 to 1/2020   As directed      Specialty:  Change follow up with Dr Ward from 4/2020 to 1/2020         Cardiac Rehab SET-PAD   Oct 22, 2019      SET-PAD education and risk factors provided to patient? (req'd for insurance):  Yes               Discharge Medications     Discharge Medications      New Medications      Instructions Start Date   cilostazol 100 MG tablet  Commonly known as:  PLETAL   100 mg, Oral, 2 Times Daily      varenicline 0.5 MG X 11 & 1 MG X 42 tablet  Commonly known as:  CHANTIX STARTING MONTH PHONG   Take 0.5 mg one daily on days 1-3 and and 0.5 mg twice daily on days 4-7.Then 1 mg twice daily for a total of 12 weeks.      varenicline 1 MG tablet  Commonly known as:  CHANTIX CONTINUING MONTH PHONG   1 mg, Oral, 2 Times Daily   Start Date:  11/15/2019        Changes to Medications      Instructions Start Date   aspirin 81 MG tablet  What changed:  how much to take   81 mg, Oral, Daily         Continue These Medications      Instructions Start Date   carvedilol 12.5 MG tablet  Commonly known as:  COREG   take 1 tablet by mouth every 12 hours      lisinopril 10 MG tablet  Commonly known as:  PRINIVIL,ZESTRIL   take 1 tablet by mouth once daily      ondansetron 4 MG tablet  Commonly known as:  ZOFRAN   4 mg, Oral, Every 8 Hours PRN, for nausea      pantoprazole 20 MG EC tablet  Commonly known as:  PROTONIX   Daily      rosuvastatin 40 MG tablet  Commonly known as:  CRESTOR   Daily      sertraline 50 MG tablet  Commonly known as:  ZOLOFT   Daily              Bill Ward MD  10/18/19  8:15 AM    Time: Discharge 15 min

## 2019-10-18 NOTE — PLAN OF CARE
Problem: Patient Care Overview  Goal: Plan of Care Review  Outcome: Ongoing (interventions implemented as appropriate)   10/18/19 0807   Coping/Psychosocial   Plan of Care Reviewed With patient   Plan of Care Review   Progress improving   OTHER   Outcome Summary Patient is discharging home today with family

## 2019-10-19 LAB
ACT BLD: 230 SECONDS (ref 82–152)
ACT BLD: 263 SECONDS (ref 82–152)

## 2019-10-22 ENCOUNTER — CARE COORDINATION (OUTPATIENT)
Dept: CARE COORDINATION | Age: 74
End: 2019-10-22

## 2019-10-23 ENCOUNTER — OFFICE VISIT (OUTPATIENT)
Dept: PRIMARY CARE CLINIC | Age: 74
End: 2019-10-23
Payer: MEDICARE

## 2019-10-23 VITALS
HEART RATE: 77 BPM | DIASTOLIC BLOOD PRESSURE: 80 MMHG | BODY MASS INDEX: 28.89 KG/M2 | SYSTOLIC BLOOD PRESSURE: 124 MMHG | WEIGHT: 213 LBS | OXYGEN SATURATION: 99 %

## 2019-10-23 DIAGNOSIS — I73.9 PVD (PERIPHERAL VASCULAR DISEASE) WITH CLAUDICATION (HCC): Primary | ICD-10-CM

## 2019-10-23 DIAGNOSIS — Z72.0 TOBACCO ABUSE: ICD-10-CM

## 2019-10-23 PROCEDURE — 99495 TRANSJ CARE MGMT MOD F2F 14D: CPT | Performed by: NURSE PRACTITIONER

## 2019-10-23 RX ORDER — CILOSTAZOL 100 MG/1
100 TABLET ORAL
COMMUNITY
Start: 2019-10-17 | End: 2020-02-19 | Stop reason: ALTCHOICE

## 2019-10-23 RX ORDER — VARENICLINE TARTRATE 25 MG
KIT ORAL
Qty: 2 BOX | Refills: 0 | Status: SHIPPED | OUTPATIENT
Start: 2019-10-23 | End: 2020-02-19 | Stop reason: ALTCHOICE

## 2019-10-23 RX ORDER — VARENICLINE TARTRATE 25 MG
KIT ORAL
Qty: 1 BOX | Refills: 0 | Status: SHIPPED | OUTPATIENT
Start: 2019-10-23 | End: 2020-02-19 | Stop reason: ALTCHOICE

## 2019-10-23 ASSESSMENT — ENCOUNTER SYMPTOMS
COUGH: 0
NAUSEA: 0
ABDOMINAL PAIN: 0
EYE PAIN: 0
SORE THROAT: 0
VOMITING: 0
SHORTNESS OF BREATH: 0

## 2019-10-30 ENCOUNTER — TELEPHONE (OUTPATIENT)
Dept: CARDIOLOGY | Facility: CLINIC | Age: 74
End: 2019-10-30

## 2019-11-19 ENCOUNTER — TREATMENT (OUTPATIENT)
Dept: CARDIAC REHAB | Facility: HOSPITAL | Age: 74
End: 2019-11-19

## 2019-11-19 DIAGNOSIS — I73.9 PERIPHERAL VASCULAR DISEASE WITH CLAUDICATION (HCC): ICD-10-CM

## 2019-11-19 PROCEDURE — 93668 PERIPHERAL VASCULAR REHAB: CPT

## 2019-11-25 ENCOUNTER — TREATMENT (OUTPATIENT)
Dept: CARDIAC REHAB | Facility: HOSPITAL | Age: 74
End: 2019-11-25

## 2019-11-25 DIAGNOSIS — I73.9 PERIPHERAL VASCULAR DISEASE WITH CLAUDICATION (HCC): Primary | ICD-10-CM

## 2019-11-25 PROCEDURE — 93668 PERIPHERAL VASCULAR REHAB: CPT

## 2019-11-25 NOTE — PROGRESS NOTES
Pt was seen today in CR for SET-PAD visit.  Vital signs and session notes recorded in Hall and will be scanned into Epic by HIM.

## 2019-11-29 ENCOUNTER — APPOINTMENT (OUTPATIENT)
Dept: CARDIAC REHAB | Facility: HOSPITAL | Age: 74
End: 2019-11-29

## 2019-12-09 ENCOUNTER — TREATMENT (OUTPATIENT)
Dept: CARDIAC REHAB | Facility: HOSPITAL | Age: 74
End: 2019-12-09

## 2019-12-09 DIAGNOSIS — I73.9 PERIPHERAL VASCULAR DISEASE WITH CLAUDICATION (HCC): Primary | ICD-10-CM

## 2019-12-09 PROCEDURE — 93668 PERIPHERAL VASCULAR REHAB: CPT

## 2019-12-09 NOTE — PROGRESS NOTES
Pt was seen today in CR for a Phase 2 visit.  Vital signs and session notes recorded in Lingotek and will be scanned into Epic by HIM.

## 2019-12-11 ENCOUNTER — APPOINTMENT (OUTPATIENT)
Dept: CARDIAC REHAB | Facility: HOSPITAL | Age: 74
End: 2019-12-11

## 2019-12-13 ENCOUNTER — APPOINTMENT (OUTPATIENT)
Dept: CARDIAC REHAB | Facility: HOSPITAL | Age: 74
End: 2019-12-13

## 2019-12-16 ENCOUNTER — TREATMENT (OUTPATIENT)
Dept: CARDIAC REHAB | Facility: HOSPITAL | Age: 74
End: 2019-12-16

## 2019-12-16 DIAGNOSIS — I73.9 PERIPHERAL VASCULAR DISEASE WITH CLAUDICATION (HCC): Primary | ICD-10-CM

## 2019-12-16 PROCEDURE — 93668 PERIPHERAL VASCULAR REHAB: CPT

## 2019-12-16 NOTE — PROGRESS NOTES
Pt was seen today in CR for a SET-PAD.  Vital signs and session notes recorded in AuditFile and will be scanned into Epic by HIM.

## 2019-12-18 ENCOUNTER — APPOINTMENT (OUTPATIENT)
Dept: CARDIAC REHAB | Facility: HOSPITAL | Age: 74
End: 2019-12-18

## 2019-12-20 ENCOUNTER — APPOINTMENT (OUTPATIENT)
Dept: CARDIAC REHAB | Facility: HOSPITAL | Age: 74
End: 2019-12-20

## 2019-12-30 ENCOUNTER — TREATMENT (OUTPATIENT)
Dept: CARDIAC REHAB | Facility: HOSPITAL | Age: 74
End: 2019-12-30

## 2019-12-30 DIAGNOSIS — I73.9 PERIPHERAL VASCULAR DISEASE WITH CLAUDICATION (HCC): Primary | ICD-10-CM

## 2019-12-30 PROCEDURE — 93668 PERIPHERAL VASCULAR REHAB: CPT

## 2019-12-30 NOTE — PROGRESS NOTES
Pt was seen today in CR for a SET PAD visit.  Vital signs and session notes recorded in Carma and will be scanned into Epic by HIM.

## 2020-01-03 ENCOUNTER — APPOINTMENT (OUTPATIENT)
Dept: CARDIAC REHAB | Facility: HOSPITAL | Age: 75
End: 2020-01-03

## 2020-01-06 ENCOUNTER — TREATMENT (OUTPATIENT)
Dept: CARDIAC REHAB | Facility: HOSPITAL | Age: 75
End: 2020-01-06

## 2020-01-06 DIAGNOSIS — I73.9 PERIPHERAL VASCULAR DISEASE WITH CLAUDICATION (HCC): Primary | ICD-10-CM

## 2020-01-06 PROCEDURE — 93668 PERIPHERAL VASCULAR REHAB: CPT

## 2020-01-06 RX ORDER — LISINOPRIL 10 MG/1
10 TABLET ORAL DAILY
Qty: 90 TABLET | Refills: 1 | Status: SHIPPED | OUTPATIENT
Start: 2020-01-06 | End: 2020-08-18 | Stop reason: SDUPTHER

## 2020-01-06 RX ORDER — CARVEDILOL 12.5 MG/1
12.5 TABLET ORAL EVERY 12 HOURS
Qty: 180 TABLET | Refills: 1 | Status: SHIPPED | OUTPATIENT
Start: 2020-01-06 | End: 2020-11-20 | Stop reason: SDUPTHER

## 2020-01-07 NOTE — PROGRESS NOTES
Pt was seen today in CR for a SET PAD visit.  Vital signs and session notes recorded in Tabber and will be scanned into Epic by HIM.

## 2020-01-08 ENCOUNTER — APPOINTMENT (OUTPATIENT)
Dept: CARDIAC REHAB | Facility: HOSPITAL | Age: 75
End: 2020-01-08

## 2020-01-10 ENCOUNTER — APPOINTMENT (OUTPATIENT)
Dept: CARDIAC REHAB | Facility: HOSPITAL | Age: 75
End: 2020-01-10

## 2020-01-13 ENCOUNTER — TREATMENT (OUTPATIENT)
Dept: CARDIAC REHAB | Facility: HOSPITAL | Age: 75
End: 2020-01-13

## 2020-01-13 DIAGNOSIS — I73.9 PERIPHERAL VASCULAR DISEASE WITH CLAUDICATION (HCC): Primary | ICD-10-CM

## 2020-01-13 PROCEDURE — 93668 PERIPHERAL VASCULAR REHAB: CPT

## 2020-01-13 RX ORDER — ROSUVASTATIN CALCIUM 40 MG/1
40 TABLET, COATED ORAL EVERY EVENING
Qty: 90 TABLET | Refills: 1 | Status: SHIPPED | OUTPATIENT
Start: 2020-01-13 | End: 2020-08-19 | Stop reason: SDUPTHER

## 2020-01-13 NOTE — PROGRESS NOTES
Attended CR for SET-PAD. Vital signs and session notes recorded in [employer] and will be scanned into Epic by HIM.

## 2020-01-15 ENCOUNTER — APPOINTMENT (OUTPATIENT)
Dept: CARDIAC REHAB | Facility: HOSPITAL | Age: 75
End: 2020-01-15

## 2020-01-17 ENCOUNTER — OFFICE VISIT (OUTPATIENT)
Dept: CARDIOLOGY | Facility: CLINIC | Age: 75
End: 2020-01-17

## 2020-01-17 ENCOUNTER — APPOINTMENT (OUTPATIENT)
Dept: CARDIAC REHAB | Facility: HOSPITAL | Age: 75
End: 2020-01-17

## 2020-01-17 VITALS
SYSTOLIC BLOOD PRESSURE: 136 MMHG | DIASTOLIC BLOOD PRESSURE: 94 MMHG | RESPIRATION RATE: 18 BRPM | HEART RATE: 74 BPM | WEIGHT: 230 LBS | BODY MASS INDEX: 31.15 KG/M2 | OXYGEN SATURATION: 94 % | HEIGHT: 72 IN

## 2020-01-17 DIAGNOSIS — E78.5 DYSLIPIDEMIA: ICD-10-CM

## 2020-01-17 DIAGNOSIS — I73.9 PERIPHERAL VASCULAR DISEASE WITH CLAUDICATION (HCC): ICD-10-CM

## 2020-01-17 DIAGNOSIS — I10 ESSENTIAL HYPERTENSION: ICD-10-CM

## 2020-01-17 DIAGNOSIS — I65.23 BILATERAL CAROTID ARTERY STENOSIS: ICD-10-CM

## 2020-01-17 DIAGNOSIS — I25.10 CORONARY ARTERY DISEASE INVOLVING NATIVE CORONARY ARTERY OF NATIVE HEART WITHOUT ANGINA PECTORIS: Primary | ICD-10-CM

## 2020-01-17 PROCEDURE — 99214 OFFICE O/P EST MOD 30 MIN: CPT | Performed by: INTERNAL MEDICINE

## 2020-01-17 NOTE — PROGRESS NOTES
[facility] AT [address], [address]  [place], [place], [address] [phone]  [email or web address]           OUTPATIENT CLINIC FOLLOW UP NOTE    Patient Care Team:  Patient Care Team:  Sahra Toro APRN as PCP - General (Family Medicine)    Subjective:   Reason for consultation:   Chief Complaint   Patient presents with   • Follow-up   • Dizziness       HPI:    Thor Ca is a 74 y.o. male.  Problem list:  1.  Chest pain, NSTEMI 9/2016 with intermediate LAD disease and no intervention.  Possible spontaneous revascularization  PAD, chronic total occlusion of the left SFA, unsuccessful attempt at antegrade crossing 10/2019  2.  Aortic stenosis  3.  Carotid stenosis  4.  Claudication  5.  Tobacco dependence    The patient presents today for follow-up.      Since his attempt at left SFA  crossing, the patient has participated intermediately at cardiac rehab, has stopped smoking, and is tried Pletal.  These of not changed his symptoms.  He still has significant aching and discomfort after walking for 5 to 10 minutes.    The patient states that he has not had chest pain    Tolerating Crestor    Review of systems  Positive for claudication  Negative for exertional chest pain, dyspnea with exertion, orthopnea, PND, lower extremity edema, palpitations, lightheadedness, syncope.     PFSH:  Patient Active Problem List   Diagnosis   • Coronary artery disease involving native coronary artery of native heart without angina pectoris   • Essential hypertension   • Dyslipidemia   • Bilateral carotid artery stenosis   • Peripheral vascular disease with claudication (CMS/HCC)   • CKD (chronic kidney disease) stage 3, GFR 30-59 ml/min (CMS/HCC)         Current Outpatient Medications:   •  aspirin 81 MG tablet, Take 1 tablet by mouth Daily., Disp: 30 tablet, Rfl: 11  •  carvedilol (COREG) 12.5 MG tablet, Take 1 tablet by mouth Every 12 (Twelve) Hours., Disp: 180 tablet, Rfl: 1  •   "cilostazol (PLETAL) 100 MG tablet, Take 1 tablet by mouth 2 (Two) Times a Day., Disp: 60 tablet, Rfl: 11  •  lisinopril (PRINIVIL,ZESTRIL) 10 MG tablet, Take 1 tablet by mouth Daily., Disp: 90 tablet, Rfl: 1  •  pantoprazole (PROTONIX) 20 MG EC tablet, Daily., Disp: , Rfl:   •  rosuvastatin (CRESTOR) 40 MG tablet, Daily., Disp: , Rfl:   •  ondansetron (ZOFRAN) 4 MG tablet, Take 4 mg by mouth Every 8 (Eight) Hours As Needed. for nausea, Disp: , Rfl: 0  •  sertraline (ZOLOFT) 50 MG tablet, Daily., Disp: , Rfl: 0  •  varenicline (CHANTIX CONTINUING MONTH PAK) 1 MG tablet, Take 1 tablet by mouth 2 (Two) Times a Day., Disp: 60 tablet, Rfl: 2  •  varenicline (CHANTIX STARTING MONTH PHONG) 0.5 MG X 11 & 1 MG X 42 tablet, Take 0.5 mg one daily on days 1-3 and and 0.5 mg twice daily on days 4-7.Then 1 mg twice daily for a total of 12 weeks., Disp: 1 tablet, Rfl: 0    No Known Allergies     reports that he has been smoking cigarettes. He has a 25.00 pack-year smoking history. He has never used smokeless tobacco.    Family History   Problem Relation Age of Onset   • Heart disease Mother    • Diabetes Mother          Objective:   Blood pressure 136/94, pulse 74, resp. rate 18, height 182.9 cm (72\"), weight 104 kg (230 lb), SpO2 94 %.    CONSTITUTIONAL: No acute distress, normal affect  RESPIRATORY: Normal effort. Clear to auscultation bilaterally without wheezing or rales  CARDIOVASCULAR: Carotid bruit right side. Regular rate and rhythm with normal S1 and S2. Without gallop or rub. CHAR at RUSB without radiation  PERIPHERAL VASCULAR: Normal radial pulses bilaterally.   No significant lower extremity swelling    9/2019 exam: 0+ left DP pulse.  2+ right DP pulse.     Labs:  Lab Results   Component Value Date    ALT 29 09/16/2016    AST 38 (H) 09/16/2016     Lab Results   Component Value Date    CHOL 233 (H) 10/17/2019    TRIG 149 10/17/2019    HDL 46 10/17/2019    CREATININE 1.44 (H) 10/17/2019       Diagnostic Data:  "   Procedures      Coronary Angiogram 9/2016  Mild to moderate 2 vessel CAD involving the LAD and Ramus, no culprit lesion or functionally significant stenosis was found for intervention. Borderline 50% mid LAD stenosis with 0.91 FFR. The patient possibly had a ruptured plaque that has since revascularized.    Peripheral angiogram 10/2019  · There was a 100% chronic total occlusion of the mid SFA.  Attempts to cross the occlusion were unsuccessful.    Carotid duplex 10/2019  · Proximal right internal carotid artery stenosis of 50-69%.  · Proximal left internal carotid artery stenosis of 0-49%.  · Antegrade flow in the vertebral arteries bilaterally    Carotid Duplex US 9/2016  · Proximal right internal carotid artery stenosis of 50-69%.  · Proximal left internal carotid artery stenosis of 0-49%.    TTE E 9/2019  -EF 60 to 65%, grade 1 diastolic dysfunction, mild to moderate LVH, severe aortic calcification without stenosis or regurgitation, mildly dilated aorta    TTE 9/2016  · Left ventricular function is normal. Estimated EF = 64%.  · Left ventricular diastolic dysfunction (grade I a) consistent with impaired relaxation.  · Left ventricular wall thickness is consistent with mild concentric hypertrophy.  · All left ventricular wall segments contract normally.  · Mild aortic valve stenosis is present.  · There is no evidence of pericardial effusion.  · Estimated right ventricular systolic pressure from tricuspid regurgitation is normal (<35 mmHg).    Assessment and Plan:   Coronary artery disease involving native coronary artery of native heart without angina pectoris  Mixed hyperlipidemia  -CCS 0, NSTEMI 9/2016, negative iFR of intermediate LAD disease, no PCI, EF normal  -Continue current medicines and cardiac rehab as tolerated  -Myalgias with atorvastatin, tolerating Crestor    PAD  -[place] class II-III symptoms  -Left SFA   -Persistent symptoms despite smoking cessation, trial of Pletal, and participation  in cardiac rehab  -We will schedule a repeat attempt at crossing the left SFA  via with left PT/pedal access and right femoral artery access.      Aortic calcification  -Without significant regurg and stenosis as of 10/2019, clinical monitoring    Bruit of right carotid artery  -Moderate disease as of 10/2019  -ASA, statin    Essential hypertension  -Continue current medications     Tobacco abuse  -Successfully quit in late 2019 with use of Chantix.  Now off of Chantix      -Return for Follow-up to be scheduled after peripheral angiogram/intervention.    Bill Ward MD, MSc, [facility]  Interventional Cardiology  [facility]

## 2020-01-22 ENCOUNTER — APPOINTMENT (OUTPATIENT)
Dept: CARDIAC REHAB | Facility: HOSPITAL | Age: 75
End: 2020-01-22

## 2020-01-22 ENCOUNTER — PREP FOR SURGERY (OUTPATIENT)
Dept: OTHER | Facility: HOSPITAL | Age: 75
End: 2020-01-22

## 2020-01-22 DIAGNOSIS — I73.9 PAD (PERIPHERAL ARTERY DISEASE) (HCC): Primary | ICD-10-CM

## 2020-01-22 RX ORDER — ASPIRIN 81 MG/1
81 TABLET ORAL DAILY
Status: CANCELLED | OUTPATIENT
Start: 2020-01-23

## 2020-01-22 RX ORDER — ONDANSETRON 2 MG/ML
4 INJECTION INTRAMUSCULAR; INTRAVENOUS EVERY 6 HOURS PRN
Status: CANCELLED | OUTPATIENT
Start: 2020-01-22

## 2020-01-22 RX ORDER — SODIUM CHLORIDE 0.9 % (FLUSH) 0.9 %
10 SYRINGE (ML) INJECTION AS NEEDED
Status: CANCELLED | OUTPATIENT
Start: 2020-01-22

## 2020-01-22 RX ORDER — ASPIRIN 81 MG/1
324 TABLET, CHEWABLE ORAL ONCE
Status: CANCELLED | OUTPATIENT
Start: 2020-01-22 | End: 2020-01-22

## 2020-01-22 RX ORDER — SODIUM CHLORIDE 0.9 % (FLUSH) 0.9 %
3 SYRINGE (ML) INJECTION EVERY 12 HOURS SCHEDULED
Status: CANCELLED | OUTPATIENT
Start: 2020-01-22

## 2020-01-27 ENCOUNTER — APPOINTMENT (OUTPATIENT)
Dept: CARDIAC REHAB | Facility: HOSPITAL | Age: 75
End: 2020-01-27

## 2020-01-29 ENCOUNTER — APPOINTMENT (OUTPATIENT)
Dept: CARDIAC REHAB | Facility: HOSPITAL | Age: 75
End: 2020-01-29

## 2020-01-31 ENCOUNTER — APPOINTMENT (OUTPATIENT)
Dept: CARDIOLOGY | Facility: HOSPITAL | Age: 75
End: 2020-01-31

## 2020-01-31 ENCOUNTER — APPOINTMENT (OUTPATIENT)
Dept: CARDIAC REHAB | Facility: HOSPITAL | Age: 75
End: 2020-01-31

## 2020-01-31 ENCOUNTER — HOSPITAL ENCOUNTER (OUTPATIENT)
Facility: HOSPITAL | Age: 75
Discharge: HOME | End: 2020-02-01
Attending: INTERNAL MEDICINE | Admitting: INTERNAL MEDICINE

## 2020-01-31 DIAGNOSIS — I73.9 PERIPHERAL VASCULAR DISEASE WITH CLAUDICATION (HCC): ICD-10-CM

## 2020-01-31 DIAGNOSIS — I73.9 PAD (PERIPHERAL ARTERY DISEASE) (HCC): ICD-10-CM

## 2020-01-31 LAB
ABO GROUP BLD: NORMAL
ABO GROUP BLD: NORMAL
ALBUMIN SERPL-MCNC: 4.1 G/DL (ref 3.5–5.2)
ALBUMIN/GLOB SERPL: 1.2 G/DL
ALP SERPL-CCNC: 75 U/L (ref 39–117)
ALT SERPL W P-5'-P-CCNC: 11 U/L (ref 1–41)
ANION GAP SERPL CALCULATED.3IONS-SCNC: 10 MMOL/L (ref 5–15)
AST SERPL-CCNC: 14 U/L (ref 1–40)
BH CV LEA LEFT PTA DISTAL EDV: 12.3 CM/S
BH CV LEA LEFT PTA DISTAL PSV: 88.9 CM/S
BH CV LEA LEFT PTA MID EDV: 12.9 CM/S
BH CV LEA LEFT PTA MID PSV: 74 CM/S
BH CV LEA LEFT PTA PROX EDV: 8.73 CM/S
BH CV LEA LEFT PTA PROX PSV: 69.1 CM/S
BILIRUB SERPL-MCNC: 0.3 MG/DL (ref 0.2–1.2)
BLD GP AB SCN SERPL QL: NEGATIVE
BUN BLD-MCNC: 26 MG/DL (ref 8–23)
BUN/CREAT SERPL: 19.1 (ref 7–25)
CALCIUM SPEC-SCNC: 9.3 MG/DL (ref 8.6–10.5)
CHLORIDE SERPL-SCNC: 106 MMOL/L (ref 98–107)
CHOLEST SERPL-MCNC: 213 MG/DL (ref 0–200)
CO2 SERPL-SCNC: 23 MMOL/L (ref 22–29)
CREAT BLD-MCNC: 1.36 MG/DL (ref 0.76–1.27)
DEPRECATED RDW RBC AUTO: 45.5 FL (ref 37–54)
ERYTHROCYTE [DISTWIDTH] IN BLOOD BY AUTOMATED COUNT: 12.6 % (ref 12.3–15.4)
GFR SERPL CREATININE-BSD FRML MDRD: 52 ML/MIN/1.73
GLOBULIN UR ELPH-MCNC: 3.4 GM/DL
GLUCOSE BLD-MCNC: 100 MG/DL (ref 65–99)
HBA1C MFR BLD: 5.6 % (ref 4.8–5.6)
HCT VFR BLD AUTO: 40.2 % (ref 37.5–51)
HCT VFR BLD AUTO: 40.9 % (ref 37.5–51)
HDLC SERPL-MCNC: 50 MG/DL (ref 40–60)
HGB BLD-MCNC: 12.7 G/DL (ref 13–17.7)
HGB BLD-MCNC: 12.9 G/DL (ref 13–17.7)
LDLC SERPL CALC-MCNC: 139 MG/DL (ref 0–100)
LDLC/HDLC SERPL: 2.78 {RATIO}
MCH RBC QN AUTO: 31.5 PG (ref 26.6–33)
MCHC RBC AUTO-ENTMCNC: 32.1 G/DL (ref 31.5–35.7)
MCV RBC AUTO: 98 FL (ref 79–97)
PLATELET # BLD AUTO: 253 10*3/MM3 (ref 140–450)
PMV BLD AUTO: 10.3 FL (ref 6–12)
POTASSIUM BLD-SCNC: 4.4 MMOL/L (ref 3.5–5.2)
PROT SERPL-MCNC: 7.5 G/DL (ref 6–8.5)
RBC # BLD AUTO: 4.1 10*6/MM3 (ref 4.14–5.8)
RH BLD: POSITIVE
RH BLD: POSITIVE
SODIUM BLD-SCNC: 139 MMOL/L (ref 136–145)
T&S EXPIRATION DATE: NORMAL
TRIGL SERPL-MCNC: 121 MG/DL (ref 0–150)
VLDLC SERPL-MCNC: 24.2 MG/DL
WBC NRBC COR # BLD: 9.03 10*3/MM3 (ref 3.4–10.8)

## 2020-01-31 PROCEDURE — 93926 LOWER EXTREMITY STUDY: CPT

## 2020-01-31 PROCEDURE — C1894 INTRO/SHEATH, NON-LASER: HCPCS | Performed by: INTERNAL MEDICINE

## 2020-01-31 PROCEDURE — 25010000002 MIDAZOLAM PER 1 MG: Performed by: INTERNAL MEDICINE

## 2020-01-31 PROCEDURE — 80061 LIPID PANEL: CPT | Performed by: INTERNAL MEDICINE

## 2020-01-31 PROCEDURE — 25010000002 FENTANYL CITRATE (PF) 100 MCG/2ML SOLUTION: Performed by: INTERNAL MEDICINE

## 2020-01-31 PROCEDURE — C1887 CATHETER, GUIDING: HCPCS | Performed by: INTERNAL MEDICINE

## 2020-01-31 PROCEDURE — 76937 US GUIDE VASCULAR ACCESS: CPT | Performed by: INTERNAL MEDICINE

## 2020-01-31 PROCEDURE — 83036 HEMOGLOBIN GLYCOSYLATED A1C: CPT | Performed by: INTERNAL MEDICINE

## 2020-01-31 PROCEDURE — 86901 BLOOD TYPING SEROLOGIC RH(D): CPT

## 2020-01-31 PROCEDURE — A9270 NON-COVERED ITEM OR SERVICE: HCPCS | Performed by: INTERNAL MEDICINE

## 2020-01-31 PROCEDURE — 63710000001 CARVEDILOL 12.5 MG TABLET: Performed by: INTERNAL MEDICINE

## 2020-01-31 PROCEDURE — 85027 COMPLETE CBC AUTOMATED: CPT

## 2020-01-31 PROCEDURE — 63710000001 HYDROCODONE-ACETAMINOPHEN 7.5-325 MG TABLET: Performed by: INTERNAL MEDICINE

## 2020-01-31 PROCEDURE — C1769 GUIDE WIRE: HCPCS | Performed by: INTERNAL MEDICINE

## 2020-01-31 PROCEDURE — C1760 CLOSURE DEV, VASC: HCPCS | Performed by: INTERNAL MEDICINE

## 2020-01-31 PROCEDURE — C1884 EMBOLIZATION PROTECT SYST: HCPCS | Performed by: INTERNAL MEDICINE

## 2020-01-31 PROCEDURE — 85018 HEMOGLOBIN: CPT | Performed by: INTERNAL MEDICINE

## 2020-01-31 PROCEDURE — 86900 BLOOD TYPING SEROLOGIC ABO: CPT

## 2020-01-31 PROCEDURE — 86901 BLOOD TYPING SEROLOGIC RH(D): CPT | Performed by: INTERNAL MEDICINE

## 2020-01-31 PROCEDURE — 25010000002 HEPARIN (PORCINE) PER 1000 UNITS: Performed by: INTERNAL MEDICINE

## 2020-01-31 PROCEDURE — 63710000001 ROSUVASTATIN 20 MG TABLET: Performed by: INTERNAL MEDICINE

## 2020-01-31 PROCEDURE — C2623 CATH, TRANSLUMIN, DRUG-COAT: HCPCS | Performed by: INTERNAL MEDICINE

## 2020-01-31 PROCEDURE — 36415 COLL VENOUS BLD VENIPUNCTURE: CPT

## 2020-01-31 PROCEDURE — C1725 CATH, TRANSLUMIN NON-LASER: HCPCS | Performed by: INTERNAL MEDICINE

## 2020-01-31 PROCEDURE — 0 IOPAMIDOL PER 1 ML: Performed by: INTERNAL MEDICINE

## 2020-01-31 PROCEDURE — 76937 US GUIDE VASCULAR ACCESS: CPT

## 2020-01-31 PROCEDURE — G0378 HOSPITAL OBSERVATION PER HR: HCPCS

## 2020-01-31 PROCEDURE — 80053 COMPREHEN METABOLIC PANEL: CPT | Performed by: INTERNAL MEDICINE

## 2020-01-31 PROCEDURE — 93926 LOWER EXTREMITY STUDY: CPT | Performed by: INTERNAL MEDICINE

## 2020-01-31 PROCEDURE — 37225 PR REVSC OPN/PRQ FEM/POP W/ATHRC/ANGIOP SM VSL: CPT | Performed by: INTERNAL MEDICINE

## 2020-01-31 PROCEDURE — 25010000002 HYDRALAZINE PER 20 MG: Performed by: INTERNAL MEDICINE

## 2020-01-31 PROCEDURE — 85014 HEMATOCRIT: CPT | Performed by: INTERNAL MEDICINE

## 2020-01-31 PROCEDURE — 86900 BLOOD TYPING SEROLOGIC ABO: CPT | Performed by: INTERNAL MEDICINE

## 2020-01-31 PROCEDURE — 63710000001 CLOPIDOGREL 75 MG TABLET: Performed by: INTERNAL MEDICINE

## 2020-01-31 PROCEDURE — C1753 CATH, INTRAVAS ULTRASOUND: HCPCS | Performed by: INTERNAL MEDICINE

## 2020-01-31 PROCEDURE — C1714 CATH, TRANS ATHERECTOMY, DIR: HCPCS | Performed by: INTERNAL MEDICINE

## 2020-01-31 PROCEDURE — 85347 COAGULATION TIME ACTIVATED: CPT

## 2020-01-31 PROCEDURE — 86850 RBC ANTIBODY SCREEN: CPT | Performed by: INTERNAL MEDICINE

## 2020-01-31 RX ORDER — CLOPIDOGREL BISULFATE 75 MG/1
75 TABLET ORAL DAILY
Qty: 30 TABLET | Refills: 5 | Status: SHIPPED | OUTPATIENT
Start: 2020-02-01 | End: 2020-08-18 | Stop reason: SDUPTHER

## 2020-01-31 RX ORDER — HYDRALAZINE HYDROCHLORIDE 20 MG/ML
INJECTION INTRAMUSCULAR; INTRAVENOUS AS NEEDED
Status: DISCONTINUED | OUTPATIENT
Start: 2020-01-31 | End: 2020-01-31 | Stop reason: HOSPADM

## 2020-01-31 RX ORDER — SODIUM CHLORIDE 0.9 % (FLUSH) 0.9 %
10 SYRINGE (ML) INJECTION AS NEEDED
Status: DISCONTINUED | OUTPATIENT
Start: 2020-01-31 | End: 2020-01-31 | Stop reason: HOSPADM

## 2020-01-31 RX ORDER — ROSUVASTATIN CALCIUM 20 MG/1
40 TABLET, COATED ORAL NIGHTLY
Status: DISCONTINUED | OUTPATIENT
Start: 2020-01-31 | End: 2020-02-01 | Stop reason: HOSPADM

## 2020-01-31 RX ORDER — CLOPIDOGREL BISULFATE 75 MG/1
TABLET ORAL AS NEEDED
Status: DISCONTINUED | OUTPATIENT
Start: 2020-01-31 | End: 2020-01-31 | Stop reason: HOSPADM

## 2020-01-31 RX ORDER — PANTOPRAZOLE SODIUM 40 MG/1
40 TABLET, DELAYED RELEASE ORAL DAILY
Status: DISCONTINUED | OUTPATIENT
Start: 2020-02-01 | End: 2020-02-01 | Stop reason: HOSPADM

## 2020-01-31 RX ORDER — HYDROCODONE BITARTRATE AND ACETAMINOPHEN 7.5; 325 MG/1; MG/1
1 TABLET ORAL EVERY 6 HOURS PRN
Status: DISCONTINUED | OUTPATIENT
Start: 2020-01-31 | End: 2020-02-01 | Stop reason: HOSPADM

## 2020-01-31 RX ORDER — SODIUM CHLORIDE 9 MG/ML
1-3 INJECTION, SOLUTION INTRAVENOUS CONTINUOUS
Status: DISCONTINUED | OUTPATIENT
Start: 2020-01-31 | End: 2020-02-01 | Stop reason: HOSPADM

## 2020-01-31 RX ORDER — HYDRALAZINE HYDROCHLORIDE 20 MG/ML
10 INJECTION INTRAMUSCULAR; INTRAVENOUS EVERY 6 HOURS PRN
Status: DISCONTINUED | OUTPATIENT
Start: 2020-01-31 | End: 2020-02-01 | Stop reason: HOSPADM

## 2020-01-31 RX ORDER — ASPIRIN 81 MG/1
81 TABLET ORAL DAILY
Status: DISCONTINUED | OUTPATIENT
Start: 2020-02-01 | End: 2020-01-31 | Stop reason: HOSPADM

## 2020-01-31 RX ORDER — SODIUM CHLORIDE 9 MG/ML
INJECTION, SOLUTION INTRAVENOUS CONTINUOUS PRN
Status: COMPLETED | OUTPATIENT
Start: 2020-01-31 | End: 2020-01-31

## 2020-01-31 RX ORDER — LIDOCAINE HYDROCHLORIDE 10 MG/ML
INJECTION, SOLUTION EPIDURAL; INFILTRATION; INTRACAUDAL; PERINEURAL AS NEEDED
Status: DISCONTINUED | OUTPATIENT
Start: 2020-01-31 | End: 2020-01-31 | Stop reason: HOSPADM

## 2020-01-31 RX ORDER — CARVEDILOL 12.5 MG/1
12.5 TABLET ORAL EVERY 12 HOURS
Status: DISCONTINUED | OUTPATIENT
Start: 2020-01-31 | End: 2020-02-01 | Stop reason: HOSPADM

## 2020-01-31 RX ORDER — ONDANSETRON 2 MG/ML
4 INJECTION INTRAMUSCULAR; INTRAVENOUS EVERY 6 HOURS PRN
Status: DISCONTINUED | OUTPATIENT
Start: 2020-01-31 | End: 2020-01-31 | Stop reason: HOSPADM

## 2020-01-31 RX ORDER — FENTANYL CITRATE 50 UG/ML
INJECTION, SOLUTION INTRAMUSCULAR; INTRAVENOUS AS NEEDED
Status: DISCONTINUED | OUTPATIENT
Start: 2020-01-31 | End: 2020-01-31 | Stop reason: HOSPADM

## 2020-01-31 RX ORDER — ASPIRIN 81 MG/1
324 TABLET, CHEWABLE ORAL ONCE
Status: COMPLETED | OUTPATIENT
Start: 2020-01-31 | End: 2020-01-31

## 2020-01-31 RX ORDER — CLOPIDOGREL BISULFATE 75 MG/1
75 TABLET ORAL DAILY
Status: DISCONTINUED | OUTPATIENT
Start: 2020-02-01 | End: 2020-02-01 | Stop reason: HOSPADM

## 2020-01-31 RX ORDER — CILOSTAZOL 50 MG/1
50 TABLET ORAL 2 TIMES DAILY
COMMUNITY
End: 2020-02-01 | Stop reason: HOSPADM

## 2020-01-31 RX ORDER — LISINOPRIL 10 MG/1
10 TABLET ORAL DAILY
Status: DISCONTINUED | OUTPATIENT
Start: 2020-02-01 | End: 2020-02-01 | Stop reason: HOSPADM

## 2020-01-31 RX ORDER — HEPARIN SODIUM 1000 [USP'U]/ML
INJECTION, SOLUTION INTRAVENOUS; SUBCUTANEOUS AS NEEDED
Status: DISCONTINUED | OUTPATIENT
Start: 2020-01-31 | End: 2020-01-31 | Stop reason: HOSPADM

## 2020-01-31 RX ORDER — SODIUM CHLORIDE 9 MG/ML
50 INJECTION, SOLUTION INTRAVENOUS CONTINUOUS
Status: ACTIVE | OUTPATIENT
Start: 2020-01-31 | End: 2020-01-31

## 2020-01-31 RX ORDER — MIDAZOLAM HYDROCHLORIDE 1 MG/ML
INJECTION INTRAMUSCULAR; INTRAVENOUS AS NEEDED
Status: DISCONTINUED | OUTPATIENT
Start: 2020-01-31 | End: 2020-01-31 | Stop reason: HOSPADM

## 2020-01-31 RX ORDER — ASPIRIN 81 MG/1
81 TABLET ORAL DAILY
Status: DISCONTINUED | OUTPATIENT
Start: 2020-02-01 | End: 2020-02-01 | Stop reason: HOSPADM

## 2020-01-31 RX ORDER — SODIUM CHLORIDE 0.9 % (FLUSH) 0.9 %
3 SYRINGE (ML) INJECTION EVERY 12 HOURS SCHEDULED
Status: DISCONTINUED | OUTPATIENT
Start: 2020-01-31 | End: 2020-01-31 | Stop reason: HOSPADM

## 2020-01-31 RX ORDER — ONDANSETRON 4 MG/1
4 TABLET, FILM COATED ORAL EVERY 8 HOURS PRN
Status: DISCONTINUED | OUTPATIENT
Start: 2020-01-31 | End: 2020-02-01 | Stop reason: HOSPADM

## 2020-01-31 RX ADMIN — HYDROCODONE BITARTRATE AND ACETAMINOPHEN 1 TABLET: 7.5; 325 TABLET ORAL at 15:14

## 2020-01-31 RX ADMIN — CARVEDILOL 12.5 MG: 12.5 TABLET, FILM COATED ORAL at 21:06

## 2020-01-31 RX ADMIN — SODIUM CHLORIDE 50 ML/HR: 9 INJECTION, SOLUTION INTRAVENOUS at 16:30

## 2020-01-31 RX ADMIN — ASPIRIN 81 MG 324 MG: 81 TABLET ORAL at 07:42

## 2020-01-31 RX ADMIN — ROSUVASTATIN CALCIUM 40 MG: 20 TABLET, COATED ORAL at 21:07

## 2020-01-31 RX ADMIN — SODIUM CHLORIDE 3 ML/KG/HR: 9 INJECTION, SOLUTION INTRAVENOUS at 07:41

## 2020-02-01 VITALS
BODY MASS INDEX: 30.16 KG/M2 | HEIGHT: 72 IN | RESPIRATION RATE: 18 BRPM | TEMPERATURE: 98.3 F | DIASTOLIC BLOOD PRESSURE: 87 MMHG | SYSTOLIC BLOOD PRESSURE: 155 MMHG | OXYGEN SATURATION: 94 % | WEIGHT: 222.66 LBS | HEART RATE: 70 BPM

## 2020-02-01 LAB
ANION GAP SERPL CALCULATED.3IONS-SCNC: 12 MMOL/L (ref 5–15)
BUN BLD-MCNC: 22 MG/DL (ref 8–23)
BUN/CREAT SERPL: 18.2 (ref 7–25)
CALCIUM SPEC-SCNC: 9.5 MG/DL (ref 8.6–10.5)
CHLORIDE SERPL-SCNC: 105 MMOL/L (ref 98–107)
CO2 SERPL-SCNC: 22 MMOL/L (ref 22–29)
CREAT BLD-MCNC: 1.21 MG/DL (ref 0.76–1.27)
DEPRECATED RDW RBC AUTO: 44.8 FL (ref 37–54)
ERYTHROCYTE [DISTWIDTH] IN BLOOD BY AUTOMATED COUNT: 12.6 % (ref 12.3–15.4)
GFR SERPL CREATININE-BSD FRML MDRD: 59 ML/MIN/1.73
GLUCOSE BLD-MCNC: 119 MG/DL (ref 65–99)
HCT VFR BLD AUTO: 39.6 % (ref 37.5–51)
HGB BLD-MCNC: 12.6 G/DL (ref 13–17.7)
MCH RBC QN AUTO: 30.6 PG (ref 26.6–33)
MCHC RBC AUTO-ENTMCNC: 31.8 G/DL (ref 31.5–35.7)
MCV RBC AUTO: 96.1 FL (ref 79–97)
PLATELET # BLD AUTO: 240 10*3/MM3 (ref 140–450)
PMV BLD AUTO: 10.5 FL (ref 6–12)
POTASSIUM BLD-SCNC: 4.5 MMOL/L (ref 3.5–5.2)
RBC # BLD AUTO: 4.12 10*6/MM3 (ref 4.14–5.8)
SODIUM BLD-SCNC: 139 MMOL/L (ref 136–145)
WBC NRBC COR # BLD: 13.34 10*3/MM3 (ref 3.4–10.8)

## 2020-02-01 PROCEDURE — A9270 NON-COVERED ITEM OR SERVICE: HCPCS | Performed by: INTERNAL MEDICINE

## 2020-02-01 PROCEDURE — 63710000001 AMLODIPINE 5 MG TABLET: Performed by: PHYSICIAN ASSISTANT

## 2020-02-01 PROCEDURE — 63710000001 ASPIRIN 81 MG TABLET DELAYED-RELEASE: Performed by: INTERNAL MEDICINE

## 2020-02-01 PROCEDURE — 63710000001 CLOPIDOGREL 75 MG TABLET: Performed by: INTERNAL MEDICINE

## 2020-02-01 PROCEDURE — 63710000001 PANTOPRAZOLE 40 MG TABLET DELAYED-RELEASE: Performed by: INTERNAL MEDICINE

## 2020-02-01 PROCEDURE — 85027 COMPLETE CBC AUTOMATED: CPT | Performed by: INTERNAL MEDICINE

## 2020-02-01 PROCEDURE — 63710000001 CARVEDILOL 12.5 MG TABLET: Performed by: INTERNAL MEDICINE

## 2020-02-01 PROCEDURE — G0378 HOSPITAL OBSERVATION PER HR: HCPCS

## 2020-02-01 PROCEDURE — A9270 NON-COVERED ITEM OR SERVICE: HCPCS | Performed by: PHYSICIAN ASSISTANT

## 2020-02-01 PROCEDURE — 63710000001 LISINOPRIL 10 MG TABLET: Performed by: INTERNAL MEDICINE

## 2020-02-01 PROCEDURE — 25010000002 HYDRALAZINE PER 20 MG: Performed by: INTERNAL MEDICINE

## 2020-02-01 PROCEDURE — 99214 OFFICE O/P EST MOD 30 MIN: CPT | Performed by: PHYSICIAN ASSISTANT

## 2020-02-01 PROCEDURE — 80048 BASIC METABOLIC PNL TOTAL CA: CPT | Performed by: INTERNAL MEDICINE

## 2020-02-01 RX ORDER — AMLODIPINE BESYLATE 5 MG/1
5 TABLET ORAL
Status: DISCONTINUED | OUTPATIENT
Start: 2020-02-01 | End: 2020-02-01 | Stop reason: HOSPADM

## 2020-02-01 RX ORDER — AMLODIPINE BESYLATE 5 MG/1
5 TABLET ORAL
Qty: 90 TABLET | Refills: 3 | Status: SHIPPED | OUTPATIENT
Start: 2020-02-01 | End: 2020-10-29

## 2020-02-01 RX ADMIN — HYDRALAZINE HYDROCHLORIDE 10 MG: 20 INJECTION INTRAMUSCULAR; INTRAVENOUS at 00:53

## 2020-02-01 RX ADMIN — PANTOPRAZOLE SODIUM 40 MG: 40 TABLET, DELAYED RELEASE ORAL at 08:41

## 2020-02-01 RX ADMIN — ASPIRIN 81 MG: 81 TABLET, COATED ORAL at 08:41

## 2020-02-01 RX ADMIN — AMLODIPINE BESYLATE 5 MG: 5 TABLET ORAL at 09:47

## 2020-02-01 RX ADMIN — CLOPIDOGREL BISULFATE 75 MG: 75 TABLET ORAL at 08:41

## 2020-02-01 RX ADMIN — LISINOPRIL 10 MG: 10 TABLET ORAL at 08:41

## 2020-02-01 RX ADMIN — CARVEDILOL 12.5 MG: 12.5 TABLET, FILM COATED ORAL at 08:41

## 2020-02-01 NOTE — PLAN OF CARE
VS stable, groin site soft, tender, old drainage marked. No further spread of drainage. No evidence of hematoma. All pulses checked. PRN hydralazine given per order, for HTN.

## 2020-02-01 NOTE — PROGRESS NOTES
"[facility] at [facility] Progress Note     LOS: 0 days   Patient Care Team:  Sahra Toro APRN as PCP - General (Family Medicine)  PCP:  Sahra Toro APRN    Chief Complaint:  PAD, HTN    SUBJECTIVE:   Pt rested well. BP is still elevated. Denies any CP, dyspnea, palpitations. Denies any pain at R gorin/L PT access sites.       OBJECTIVE:    Vital Sign Min/Max for last 24 hours  Temp  Min: 97.6 °F (36.4 °C)  Max: 98.4 °F (36.9 °C)   BP  Min: 125/88  Max: 191/102   Pulse  Min: 46  Max: 82   Resp  Min: 16  Max: 18   SpO2  Min: 93 %  Max: 99 %   No data recorded   Weight  Min: 101 kg (222 lb 10.6 oz)  Max: 101 kg (222 lb 10.6 oz)     Flowsheet Rows      First Filed Value   Admission Height  182.9 cm (72\") Documented at 01/31/2020 0645   Admission Weight  101 kg (223 lb 8 oz) Documented at 01/31/2020 0645          Telemetry: sr 85      Intake/Output Summary (Last 24 hours) at 2/1/2020 0938  Last data filed at 1/31/2020 1815  Gross per 24 hour   Intake 240 ml   Output --   Net 240 ml     Intake & Output (last 3 days)       01/29 0701 - 01/30 0700 01/30 0701 - 01/31 0700 01/31 0701 - 02/01 0700 02/01 0701 - 02/02 0700    P.O.   240     Total Intake(mL/kg)   240 (2.4)     Net   +240             Urine Unmeasured Occurrence   2 x     Stool Unmeasured Occurrence   1 x            Physical Exam:  Physical Exam   Constitutional: He is oriented to person, place, and time. He appears well-developed and well-nourished. No distress.   Cardiovascular: Normal rate, regular rhythm and intact distal pulses.   Murmur heard.  Pulses:       Radial pulses are 2+ on the right side, and 2+ on the left side.        Dorsalis pedis pulses are 2+ on the right side, and 1+ on the left side.   Pulmonary/Chest: Effort normal and breath sounds normal. He has no wheezes. He has no rales.   Abdominal: Soft. Bowel sounds are normal. There is no tenderness. There is no guarding.   Musculoskeletal: He exhibits no edema or " tenderness.   R groin site w small ecchymosis, soft, non tender. L posterior tibial site cdi, non tender.   Neurological: He is alert and oriented to person, place, and time.   Skin: Skin is warm and dry. No rash noted.   Psychiatric: He has a normal mood and affect.   Nursing note and vitals reviewed.       LABS/DIAGNOSTIC DATA:  Results from last 7 days   Lab Units 02/01/20  0637 01/31/20  1813 01/31/20  0649   WBC 10*3/mm3 13.34*  --  9.03   HEMOGLOBIN g/dL 12.6* 12.7* 12.9*   HEMATOCRIT % 39.6 40.9 40.2   PLATELETS 10*3/mm3 240  --  253     No results found for: TROPONINT      Results from last 7 days   Lab Units 02/01/20  0636 01/31/20  0649   SODIUM mmol/L 139 139   POTASSIUM mmol/L 4.5 4.4   CHLORIDE mmol/L 105 106   CO2 mmol/L 22.0 23.0   BUN mg/dL 22 26*   CREATININE mg/dL 1.21 1.36*   CALCIUM mg/dL 9.5 9.3   BILIRUBIN mg/dL  --  0.3   ALK PHOS U/L  --  75   ALT (SGPT) U/L  --  11   AST (SGOT) U/L  --  14   GLUCOSE mg/dL 119* 100*     Results from last 7 days   Lab Units 01/31/20  0649   HEMOGLOBIN A1C % 5.60     Results from last 7 days   Lab Units 01/31/20  0649   CHOLESTEROL mg/dL 213*   TRIGLYCERIDES mg/dL 121   HDL CHOL mg/dL 50   LDL CHOL mg/dL 139*               Medication Review:     aspirin 81 mg Oral Daily   carvedilol 12.5 mg Oral Q12H   clopidogrel 75 mg Oral Daily   lisinopril 10 mg Oral Daily   pantoprazole 40 mg Oral Daily   rosuvastatin 40 mg Oral Nightly        sodium chloride 1-3 mL/kg/hr Last Rate: 3 mL/kg/hr (01/31/20 0741)          Peripheral vascular disease with claudication (CMS/[facility])      ASSESSMENT/PLAN:  PAD  -[place] class II-III symptoms w prior L SFA  and persistent claudication symptoms despite smoking cessation, trial of Pletal, and cardiac rehab  - s/p directional atherectomy and drug-coated balloon angioplasty of L SFA via R femoral and L PT access    - plan for d/c today if BP <160/95 on ASA, plavix, statin, and to continue cardiac rehab. Fu for ABIs only in [place] in  4-6 weeks then w Dr. Ward in 6-8 weeks in [facility]. Pt counseled to check BP at home and call out office if elevation persists following dc.     CAD  - hx NSTEMI 9/2016 w intermediate LAD dz a negative iFR and no PCI, normal EF  - continue cardiac rehab  - continue current regimen    HLD  - continue statin therapy w rosuvastatin  - prior myalgias on atorvastatin    HTN  - labile BP noted post procedure  -  Not controlled. Will add amlodipine.   - Pt counseled to check BP at home and call out office if elevation persists following dc. Goal would be BP averaging 130/80.     Carotid Dz  - R carotid bruit  - study 10/2019 w moderate disease  - continue ASA, statin    Hx Tobacco Abuse  - cessation late 2019 w carlie, now off chantix  - counseled on the importance of staying smoke free      Electronically signed by Lauren Metzger PA-C, 02/01/20, 9:38 AM.

## 2020-02-03 ENCOUNTER — TREATMENT (OUTPATIENT)
Dept: CARDIAC REHAB | Facility: HOSPITAL | Age: 75
End: 2020-02-03

## 2020-02-03 ENCOUNTER — DOCUMENTATION (OUTPATIENT)
Dept: CARDIAC REHAB | Facility: HOSPITAL | Age: 75
End: 2020-02-03

## 2020-02-03 DIAGNOSIS — I73.9 PERIPHERAL VASCULAR DISEASE WITH CLAUDICATION (HCC): Primary | ICD-10-CM

## 2020-02-03 LAB
ACT BLD: 219 SECONDS (ref 82–152)
ACT BLD: 230 SECONDS (ref 82–152)
ACT BLD: 241 SECONDS (ref 82–152)
ACT BLD: 257 SECONDS (ref 82–152)

## 2020-02-03 NOTE — PROGRESS NOTES
Referral received for Phase II Cardiac Rehab SET-PAD.  Staff reviewed chart and patient has qualifying diagnosis for Phase II Cardiac Rehab.  Staff will contact patient in regards to scheduling or referring to another facility.

## 2020-02-04 NOTE — DISCHARGE SUMMARY
[facility] Cardiology Services  DISCHARGE SUMMARY    Admission Date: 1/31/2020       Date of Discharge:  02/01/2020    Discharge Diagnosis: PVD    Presenting Problem/History of Present Illness  Peripheral vascular disease with claudication (CMS/HCC) [I73.9]  Peripheral vascular disease with claudication (CMS/HCC) [I73.9]  Peripheral vascular disease with claudication (CMS/HCC) [I73.9]    Hospital Course  Patient is a 74 y.o. male presented for peripheral angiogram with planned intervention to the left SFA .  He underwent post dissection reentry retrograde crossing, directional atherectomy, and drug-coated balloon angioplasty.  He had labile blood pressure during the procedure and was kept overnight for observation.  His blood pressure normalized and his right femoral and left posterior tibial artery access sites were stable and he was felt okay for discharge.  He was discharged home on dual antiplatelet therapy and high intensity statin.  He was to continue cardiac rehab at [facility].  He is also to get repeat ABIs at [place] and [place] in [place] in 4 to 6 weeks.  He was also scheduled a to 8-week follow-up with Dr. Ward in the [facility].    Procedures Performed  Procedure(s):  Peripheral angiography with imaging.       Consults:   Consults     No orders found from 1/2/2020 to 2/1/2020.          Pertinent Test Results:   Peripheral angiogram 1/31/2020  · There was a 100% thrombotic calcified chronic total occlusion of the left SFA that is now status post dissection reentry retrograde crossing, directional atherectomy, drug-coated balloon angioplasty.      Condition on Discharge:  Stable    Physical Exam at Discharge  See progress note by Lauren Metzger PA-C         Discharge Disposition: Home    Discharge Diet: Cardiac heart healthy diet    Activity at Discharge: As tolerated    Follow-up Appointments  Future Appointments   Date Time Provider Department Center   2/5/2020 10:00 AM   [facility] PHASE II  [facility]   2/7/2020 10:00 AM  [facility] PH[facility]  [facility]   2/10/2020 10:00 AM  [facility] PHASE II  [facility]   2/12/2020 10:00 AM  [facility] PHASE II  [facility]   3/20/2020  1:00 PM Bill Ward MD [facility] None     Additional Instructions for the Follow-ups that You Need to Schedule     Discharge Follow-up with Specialty: Follow-up with Dr. calles in [place], [place], in 6 to 8 weeks.   As directed      Specialty:  Follow-up with Dr. calles in [place], [place], in 6 to 8 weeks.               Discharge Medications     Discharge Medications      New Medications      Instructions Start Date   amLODIPine 5 MG tablet  Commonly known as:  NORVASC   5 mg, Oral, Every 24 Hours Scheduled      clopidogrel 75 MG tablet  Commonly known as:  PLAVIX   75 mg, Oral, Daily         Continue These Medications      Instructions Start Date   aspirin 81 MG tablet   81 mg, Oral, Daily      carvedilol 12.5 MG tablet  Commonly known as:  COREG   12.5 mg, Oral, Every 12 Hours      lisinopril 10 MG tablet  Commonly known as:  PRINIVIL,ZESTRIL   10 mg, Oral, Daily      ondansetron 4 MG tablet  Commonly known as:  ZOFRAN   4 mg, Oral, Every 8 Hours PRN, for nausea      pantoprazole 20 MG EC tablet  Commonly known as:  PROTONIX   20 mg, Oral, Daily      rosuvastatin 40 MG tablet  Commonly known as:  CRESTOR   40 mg, Oral, Nightly         Stop These Medications    cilostazol 50 MG tablet  Commonly known as:  ALEXANDRA Agarwal

## 2020-02-05 ENCOUNTER — APPOINTMENT (OUTPATIENT)
Dept: CARDIAC REHAB | Facility: HOSPITAL | Age: 75
End: 2020-02-05

## 2020-02-06 ENCOUNTER — DOCUMENTATION (OUTPATIENT)
Dept: CARDIAC REHAB | Facility: HOSPITAL | Age: 75
End: 2020-02-06

## 2020-02-06 NOTE — PROGRESS NOTES
Pt. Referred for SET-PAD Cardiac Rehab. Staff left detailed message with Patient to return phone call in regards to scheduling an appointment.

## 2020-02-07 ENCOUNTER — APPOINTMENT (OUTPATIENT)
Dept: CARDIAC REHAB | Facility: HOSPITAL | Age: 75
End: 2020-02-07

## 2020-02-10 ENCOUNTER — TREATMENT (OUTPATIENT)
Dept: CARDIAC REHAB | Facility: HOSPITAL | Age: 75
End: 2020-02-10

## 2020-02-10 DIAGNOSIS — I73.9 PERIPHERAL VASCULAR DISEASE WITH CLAUDICATION (HCC): Primary | ICD-10-CM

## 2020-02-10 PROCEDURE — 93668 PERIPHERAL VASCULAR REHAB: CPT

## 2020-02-12 ENCOUNTER — APPOINTMENT (OUTPATIENT)
Dept: CARDIAC REHAB | Facility: HOSPITAL | Age: 75
End: 2020-02-12

## 2020-02-19 ENCOUNTER — OFFICE VISIT (OUTPATIENT)
Dept: PRIMARY CARE CLINIC | Age: 75
End: 2020-02-19
Payer: MEDICARE

## 2020-02-19 VITALS
BODY MASS INDEX: 29.57 KG/M2 | DIASTOLIC BLOOD PRESSURE: 80 MMHG | WEIGHT: 218 LBS | SYSTOLIC BLOOD PRESSURE: 126 MMHG | HEART RATE: 75 BPM | OXYGEN SATURATION: 98 %

## 2020-02-19 PROCEDURE — G8417 CALC BMI ABV UP PARAM F/U: HCPCS | Performed by: NURSE PRACTITIONER

## 2020-02-19 PROCEDURE — G8484 FLU IMMUNIZE NO ADMIN: HCPCS | Performed by: NURSE PRACTITIONER

## 2020-02-19 PROCEDURE — 4004F PT TOBACCO SCREEN RCVD TLK: CPT | Performed by: NURSE PRACTITIONER

## 2020-02-19 PROCEDURE — 90732 PPSV23 VACC 2 YRS+ SUBQ/IM: CPT | Performed by: NURSE PRACTITIONER

## 2020-02-19 PROCEDURE — 99214 OFFICE O/P EST MOD 30 MIN: CPT | Performed by: NURSE PRACTITIONER

## 2020-02-19 PROCEDURE — G0009 ADMIN PNEUMOCOCCAL VACCINE: HCPCS | Performed by: NURSE PRACTITIONER

## 2020-02-19 PROCEDURE — G8427 DOCREV CUR MEDS BY ELIG CLIN: HCPCS | Performed by: NURSE PRACTITIONER

## 2020-02-19 PROCEDURE — 1123F ACP DISCUSS/DSCN MKR DOCD: CPT | Performed by: NURSE PRACTITIONER

## 2020-02-19 PROCEDURE — 3017F COLORECTAL CA SCREEN DOC REV: CPT | Performed by: NURSE PRACTITIONER

## 2020-02-19 PROCEDURE — 4040F PNEUMOC VAC/ADMIN/RCVD: CPT | Performed by: NURSE PRACTITIONER

## 2020-02-19 RX ORDER — CLOPIDOGREL BISULFATE 75 MG/1
TABLET ORAL
COMMUNITY
Start: 2020-02-02 | End: 2021-03-16

## 2020-02-19 RX ORDER — DOXYCYCLINE HYCLATE 100 MG
TABLET ORAL
COMMUNITY
Start: 2020-02-17 | End: 2020-08-19 | Stop reason: ALTCHOICE

## 2020-02-19 RX ORDER — AMLODIPINE BESYLATE 5 MG/1
TABLET ORAL
COMMUNITY
Start: 2020-02-02

## 2020-02-19 ASSESSMENT — ENCOUNTER SYMPTOMS
COUGH: 0
NAUSEA: 0
SHORTNESS OF BREATH: 0
SORE THROAT: 0
ABDOMINAL PAIN: 0
EYE PAIN: 0
VOMITING: 0

## 2020-02-19 ASSESSMENT — PATIENT HEALTH QUESTIONNAIRE - PHQ9
SUM OF ALL RESPONSES TO PHQ9 QUESTIONS 1 & 2: 0
SUM OF ALL RESPONSES TO PHQ QUESTIONS 1-9: 0
2. FEELING DOWN, DEPRESSED OR HOPELESS: 0
1. LITTLE INTEREST OR PLEASURE IN DOING THINGS: 0
SUM OF ALL RESPONSES TO PHQ QUESTIONS 1-9: 0

## 2020-02-19 NOTE — PROGRESS NOTES
explained. Current VIS given.     Immunizations Administered     Name Date Dose Route    Pneumococcal Polysaccharide ([id number]) 2/19/2020 0.5 mL Intramuscular    Site: Deltoid- Left    Lot: [id number]    NDC: 0916-5790-99

## 2020-02-19 NOTE — PROGRESS NOTES
SUBJECTIVE:    Patient ID: Ange Gale is a 69 y.o. male. Medical History Review  Past Medical, Family, and Social History reviewed and does contribute to the patient presenting condition    Health Maintenance Due   Topic Date Due    Hepatitis C screen  1945    DTaP/Tdap/Td vaccine (1 - Tdap) 11/18/1958    Shingles Vaccine (1 of 2) 11/18/1997    Colon Cancer Screen FIT/FOBT  12/28/2018    Annual Wellness Visit (AWV)  06/18/2019       HPI:   Chief Complaint   Patient presents with    Hyperlipidemia     Patient here today for a follow up with HTN. He is needing his Zoloft refilled. He seen dermatology on Monday and had surgery on his ear.  Hypertension   He recently had a stent put in his left leg. He also had skin cancer surgery on his left ear. He is going to cardiac rehab 2-3 times a week. He is scheduled to go for ABIs in March. The Zoloft is still working well for him. Patient's medications, allergies, past medical, surgical, social and family histories were reviewed and updated as appropriate. Review of Systems Reviewed and acurate. See MA note. OBJECTIVE:  /80   Pulse 75   Wt 218 lb (98.9 kg)   SpO2 98%   BMI 29.57 kg/m²    Physical Exam  Vitals signs reviewed. Constitutional:       General: He is not in acute distress. Appearance: He is well-developed. HENT:      Head: Normocephalic. Right Ear: Tympanic membrane normal.      Left Ear: Tympanic membrane normal.      Mouth/Throat:      Pharynx: No oropharyngeal exudate. Eyes:      General: Lids are normal.   Neck:      Musculoskeletal: Neck supple. Cardiovascular:      Rate and Rhythm: Normal rate and regular rhythm. Heart sounds: Normal heart sounds. Pulmonary:      Effort: Pulmonary effort is normal.      Breath sounds: Normal breath sounds. Abdominal:      General: Bowel sounds are normal. There is no distension. Palpations: Abdomen is soft. Tenderness:  There is no abdominal Procedures    PNEUMOVAX 23 subcutaneous/IM (Pneumococcal polysaccharide vaccine 23-valent >= 3yo)    LIPID PANEL    COMPREHENSIVE METABOLIC PANEL       Return in about 6 months (around 8/19/2020).

## 2020-03-04 ENCOUNTER — HOSPITAL ENCOUNTER (OUTPATIENT)
Dept: ULTRASOUND IMAGING | Facility: HOSPITAL | Age: 75
Discharge: HOME | End: 2020-03-04
Payer: MEDICARE

## 2020-03-04 PROCEDURE — 93925 LOWER EXTREMITY STUDY: CPT

## 2020-04-28 NOTE — PROGRESS NOTES
[facility] AT [address], [address]  [place], [place], [address]    [phone]  [email or web address] CenterThe Networking Effect[facility]           OUTPATIENT CLINIC FOLLOW UP NOTE    Patient Care Team:  Patient Care Team:  Sahra Toro APRN as PCP - General (Family Medicine)    Subjective:   Reason for consultation:   Chief Complaint   Patient presents with   • Coronary Artery Disease       HPI:    Thor Ca is a 74 y.o. male.  Problem list:  1. Chest pain, NSTEMI 9/2016 with intermediate LAD disease and no intervention.  Possible spontaneous revascularization  2. PAD, chronic total occlusion of the left SFA, unsuccessful attempt at antegrade crossing 10/2019  3. Aortic valve calcification  4. Carotid stenosis  5. Tobacco dependence quit with Chantix in 2019    The patient presents today for follow-up by telephone.  The patient did not have a reliable Internet/video modality to connect with us today.    The patient states his left lower extremity cramping has significantly improved since his intervention.  Still has chronic knee pain bilaterally due to arthritis.    Denies chest pain, palpitations, unusual shortness of breath    BP has been 120s/80s at home. Hasn't taken meds yesterday night or this morning.    Review of systems  Positive for knee pain  Negative for exertional chest pain, dyspnea with exertion, orthopnea, PND, lower extremity edema, palpitations, lightheadedness, syncope.     PFSH:  Patient Active Problem List   Diagnosis   • Coronary artery disease involving native coronary artery of native heart without angina pectoris   • Essential hypertension   • Mixed hyperlipidemia   • Bilateral carotid artery stenosis   • Peripheral vascular disease with claudication (CMS/[facility])   • CKD (chronic kidney disease) stage 3, GFR 30-59 ml/min (CMS/[facility])         Current Outpatient Medications:   •  amLODIPine (NORVASC) 5 MG tablet, Take 1 tablet by mouth Daily., Disp: 90 tablet, Rfl: 3  •  aspirin 81 MG  "tablet, Take 1 tablet by mouth Daily., Disp: 30 tablet, Rfl: 11  •  carvedilol (COREG) 12.5 MG tablet, Take 1 tablet by mouth Every 12 (Twelve) Hours., Disp: 180 tablet, Rfl: 1  •  clopidogrel (PLAVIX) 75 MG tablet, Take 1 tablet by mouth Daily., Disp: 30 tablet, Rfl: 5  •  lisinopril (PRINIVIL,ZESTRIL) 10 MG tablet, Take 1 tablet by mouth Daily., Disp: 90 tablet, Rfl: 1  •  rosuvastatin (CRESTOR) 40 MG tablet, Take 40 mg by mouth Every Night., Disp: , Rfl:   •  sertraline (ZOLOFT) 50 MG tablet, Take 50 mg by mouth Daily., Disp: , Rfl:     No Known Allergies     reports that he has quit smoking. His smoking use included cigarettes. He has a 25.00 pack-year smoking history. He has never used smokeless tobacco.    Family History   Problem Relation Age of Onset   • Heart disease Mother    • Diabetes Mother          Objective:   Blood pressure (!) 177/101, pulse 72, height 182.9 cm (72\"), weight 95.3 kg (210 lb).  General: No acute distress  Pulmonary: No conversational dyspnea    9/2019 exam, prior to intervention: 0+ left DP pulse.  2+ right DP pulse.     Labs:  Lab Results   Component Value Date    ALT 11 01/31/2020    AST 14 01/31/2020     Lab Results   Component Value Date    CHOL 213 (H) 01/31/2020    TRIG 121 01/31/2020    HDL 50 01/31/2020    CREATININE 1.21 02/01/2020       Diagnostic Data:    Procedures    Coronary Angiogram 9/2016  Mild to moderate 2 vessel CAD involving the LAD and Ramus, no culprit lesion or functionally significant stenosis was found for intervention. Borderline 50% mid LAD stenosis with 0.91 FFR. The patient possibly had a ruptured plaque that has since revascularized.    Peripheral Angiogram 1/2020  · There was a 100% thrombotic calcified chronic total occlusion of the left SFA that is now status post dissection reentry retrograde crossing, directional atherectomy, drug-coated balloon angioplasty.    Peripheral angiogram 10/2019  · There was a 100% chronic total occlusion of the mid SFA. "  Attempts to cross the occlusion were unsuccessful.    ABIs 10/2019  Ankle brachial index preexercise - Right DAYSI  0.86, Left DAYSI 0.80  Ankle brachial index post exercise- Right DAYSI  0.86, Left DAYSI 0.73    Carotid duplex 10/2019  · Proximal right internal carotid artery stenosis of 50-69%.  · Proximal left internal carotid artery stenosis of 0-49%.  · Antegrade flow in the vertebral arteries bilaterally    Carotid Duplex US 9/2016  · Proximal right internal carotid artery stenosis of 50-69%.  · Proximal left internal carotid artery stenosis of 0-49%.    TTE E 9/2019  -EF 60 to 65%, grade 1 diastolic dysfunction, mild to moderate LVH, severe aortic calcification without stenosis or regurgitation, mildly dilated aorta root 4.1    TTE 9/2016  · Left ventricular function is normal. Estimated EF = 64%.  · Left ventricular diastolic dysfunction (grade I a) consistent with impaired relaxation.  · Left ventricular wall thickness is consistent with mild concentric hypertrophy.  · All left ventricular wall segments contract normally.  · Mild aortic valve stenosis is present.  · There is no evidence of pericardial effusion.  · Estimated right ventricular systolic pressure from tricuspid regurgitation is normal (<35 mmHg).    Assessment and Plan:     Coronary artery disease involving native coronary artery of native heart without angina pectoris  Mixed hyperlipidemia  -CCS 0, NSTEMI 9/2016, negative iFR of intermediate LAD disease, no PCI, EF normal  -Myalgias with atorvastatin, tolerating Crestor  -Continue current medicines      PAD  -Left SFA  status post intervention 2/2020  -Continue current medications  -Consider repeat ABIs at next visit    Aortic calcification  -Without significant regurg and stenosis as of 10/2019, clinical monitoring     Bruit of right carotid artery  -Moderate disease as of 10/2019  -ASA, statin, lifestyle modifications     Essential hypertension  -Continue current medications      -Return in  about 6 months (around 10/29/2020) for Next scheduled follow up in [place], with Gail Marin.    This patient has consented to a telehealth visit via telephone. The visit was scheduled as a telephone visit (patient did not have reliable access to a video platform) to comply with patient safety concerns in accordance with CDC recommendations.  All vitals recorded within this visit are reported by the patient.  I spent  12 minutes total on patient care including 6 minutes spent in direct conversation with this patient.     Bill Ward MD, MSc, [facility]  Interventional Cardiology  [facility] at [facility]

## 2020-04-29 ENCOUNTER — OFFICE VISIT (OUTPATIENT)
Dept: CARDIOLOGY | Facility: CLINIC | Age: 75
End: 2020-04-29

## 2020-04-29 VITALS
HEIGHT: 72 IN | SYSTOLIC BLOOD PRESSURE: 177 MMHG | BODY MASS INDEX: 28.44 KG/M2 | HEART RATE: 72 BPM | WEIGHT: 210 LBS | DIASTOLIC BLOOD PRESSURE: 101 MMHG

## 2020-04-29 DIAGNOSIS — I65.23 BILATERAL CAROTID ARTERY STENOSIS: ICD-10-CM

## 2020-04-29 DIAGNOSIS — I10 ESSENTIAL HYPERTENSION: ICD-10-CM

## 2020-04-29 DIAGNOSIS — I73.9 PERIPHERAL VASCULAR DISEASE WITH CLAUDICATION (HCC): ICD-10-CM

## 2020-04-29 DIAGNOSIS — I25.10 CORONARY ARTERY DISEASE INVOLVING NATIVE CORONARY ARTERY OF NATIVE HEART WITHOUT ANGINA PECTORIS: Primary | ICD-10-CM

## 2020-04-29 DIAGNOSIS — E78.2 MIXED HYPERLIPIDEMIA: ICD-10-CM

## 2020-04-29 PROCEDURE — 99442 PR PHYS/QHP TELEPHONE EVALUATION 11-20 MIN: CPT | Performed by: INTERNAL MEDICINE

## 2020-08-13 NOTE — PATIENT INSTRUCTIONS
· Keep a list of your medicines with you. List all of the prescription medicines, nonprescription medicines, supplements, natural remedies, and vitamins that you take. Tell your healthcare providers who treat you about all of the products you are taking. Your provider can provide you with a form to keep track of them. Just ask. · Follow the directions that come with your medicine, including information about food or alcohol. Make sure you know how and when to take your medicine. Do not take more or less than you are supposed to take. · Keep all medicines out of the reach of children. · Store medicines according to the directions on the label. · Monitor yourself. Learn to know how your body reacts to your new medicine and keep track of how it makes you feel before attempting (If your provider has allowed you to do so) to drive or go to work. · Seek emergency medical attention if you think you have used too much of this medicine. An overdose of any prescription medicine can be fatal. Overdose symptoms may include extreme drowsiness, muscle weakness, confusion, cold and clammy skin, pinpoint pupils, shallow breathing, slow heart rate, fainting, or coma. · Don't share prescription medicines with others, even when they seem to have the same symptoms. What may be good for you may be harmful to others. · If you are no longer taking a prescribed medication and you have pills left please take your pills out of their original containers. Mix crushed pills with an undesirable substance, such as cat litter or used coffee grounds. Put the mixture into a disposable container with a lid, such as an empty margarine tub, or into a sealable bag. Cover up or remove any of your personal information on the empty containers by covering it with black permanent marker or duct tape. Place the sealed container with the mixture, and the empty drug containers, in the trash.    · If you use a medication that is in the form of a patch, dispose of used patches by folding them in half so that the sticky sides meet, and then flushing them down a toilet. They should not be placed in the household trash where children or pets can find them. · If you have any questions, ask your provider or pharmacist for more information. · Be sure to keep all appointments for provider visits or tests. We are committed to providing you with the best care possible. In order to help us achieve these goals please remember to bring all medications, herbal products, and over the counter supplements with you to each visit. If your provider has ordered testing for you, please be sure to follow up with our office if you have not received results within 7 days after the testing took place. *If you receive a survey after visiting one of our offices, please take time to share your experience concerning your physician office visit. These surveys are confidential and no health information about you is shared. We are eager to improve for you and we are counting on your feedback to help make that happen. ips to Help You Stop Smoking       Cigarette smoking is a preventable cause of death in the [place]. If you have thought about quitting but haven't been able to, here are some reasons why you should and some ways to do it. Here's Why   Quitting smoking now can decrease your risk of getting smoking-related illnesses like:   Heart disease   Stroke   Several types of cancer, including:   Lung   Mouth   Esophagus   Larynx   Bladder   Pancreas   Kidney   Chronic lung diseases:   Bronchitis   Emphysema   Asthma   Cataracts   Macular degeneration   Thyroid conditions   Hearing loss   Erectile dysfunction   Dementia   Osteoporosis   Here's How   Once you've decided to quit smoking, set your target quit date a few weeks away.  In the time leading up to your quit day, try some of these ideas offered by the [address] [place] [address] you're down to about seven cigarettes a day, it's time to set your target quit date, and get ready to stick to it. Don't Smoke \"Automatically\"   Smoke only those cigarettes you really want. Catch yourself before you light up a cigarette out of pure habit. Don't empty your ashtrays. This will remind you of how many cigarettes you've smoked each day, and the sight and the smell of stale cigarettes butts will be very unpleasant. Make yourself aware of each cigarette by using the opposite hand or putting cigarettes in an unfamiliar location or a different pocket to break the automatic reach. If you light up many times during the day without even thinking about it, try to look in a mirror each time you put a match to your cigarette. You may decide you don't need it. Make Smoking Inconvenient   Stop buying cigarettes by the carton. Wait until one pack is empty before you buy another. Stop carrying cigarettes with you at home or at work. Make them difficult to get to. Make Smoking Unpleasant   Smoke only under circumstances that aren't especially pleasurable for you. If you like to smoke with others, smoke alone. Turn your chair to an empty corner and focus only on the cigarette you are smoking and all its many negative effects. Collect all your cigarette butts in one large glass container as a visual reminder of the filth made by smoking. Just Before Quitting   Practice going without cigarettes. Don't think of never smoking again. Think of quitting in terms of one day at a time . Tell yourself you won't smoke today, and then don't. Clean your clothes to rid them of the cigarette smell, which can linger a long time. On the Day You Quit   Throw away all your cigarettes and matches. Hide your lighters and ashtrays. Visit the dentist and have your teeth cleaned to get rid of tobacco stains. Notice how nice they look and resolve to keep them that way.    Make a list of things you'd like to buy for Click on My medical Record  --> Download Summary --> Enter Password --> Download --> Save or Open Document    Additional Information  If you have questions, please contact your physician practice where you receive care. Remember, MyChart is NOT to be used for urgent needs. For medical emergencies, dial 911. Personalized Preventive Plan for Luis Rued - 8/19/2020  Medicare offers a range of preventive health benefits. Some of the tests and screenings are paid in full while other may be subject to a deductible, co-insurance, and/or copay. Some of these benefits include a comprehensive review of your medical history including lifestyle, illnesses that may run in your family, and various assessments and screenings as appropriate. After reviewing your medical record and screening and assessments performed today your provider may have ordered immunizations, labs, imaging, and/or referrals for you. A list of these orders (if applicable) as well as your Preventive Care list are included within your After Visit Summary for your review. Other Preventive Recommendations:    A preventive eye exam performed by an eye specialist is recommended every 1-2 years to screen for glaucoma; cataracts, macular degeneration, and other eye disorders. A preventive dental visit is recommended every 6 months. Try to get at least 150 minutes of exercise per week or 10,000 steps per day on a pedometer . Order or download the FREE \"Exercise & Physical Activity: Your Everyday Guide\" from The [employer]. Call 3-613.659.1970 or search The [employer] online. You need 1156-7750 mg of calcium and 4266-5476 IU of vitamin D per day. It is possible to meet your calcium requirement with diet alone, but a vitamin D supplement is usually necessary to meet this goal.  When exposed to the sun, use a sunscreen that protects against both UVA and UVB radiation with an SPF of 30 or greater.  Reapply every

## 2020-08-18 RX ORDER — LISINOPRIL 10 MG/1
10 TABLET ORAL DAILY
Qty: 90 TABLET | Refills: 1 | Status: SHIPPED | OUTPATIENT
Start: 2020-08-18 | End: 2021-01-26 | Stop reason: SDUPTHER

## 2020-08-18 RX ORDER — CLOPIDOGREL BISULFATE 75 MG/1
75 TABLET ORAL DAILY
Qty: 30 TABLET | Refills: 5 | Status: SHIPPED | OUTPATIENT
Start: 2020-08-18 | End: 2021-05-21

## 2020-08-19 ENCOUNTER — HOSPITAL ENCOUNTER (OUTPATIENT)
Facility: HOSPITAL | Age: 75
Discharge: HOME | End: 2020-08-19
Payer: MEDICARE

## 2020-08-19 ENCOUNTER — OFFICE VISIT (OUTPATIENT)
Dept: PRIMARY CARE CLINIC | Age: 75
End: 2020-08-19
Payer: MEDICARE

## 2020-08-19 VITALS
WEIGHT: 212 LBS | OXYGEN SATURATION: 96 % | SYSTOLIC BLOOD PRESSURE: 130 MMHG | DIASTOLIC BLOOD PRESSURE: 86 MMHG | HEART RATE: 69 BPM | BODY MASS INDEX: 28.71 KG/M2 | HEIGHT: 72 IN | RESPIRATION RATE: 16 BRPM | TEMPERATURE: 98.3 F

## 2020-08-19 LAB
A/G RATIO: 1.6 (ref 0.8–2)
ALBUMIN SERPL-MCNC: 4.1 G/DL (ref 3.4–4.8)
ALP BLD-CCNC: 82 U/L (ref 25–100)
ALT SERPL-CCNC: 10 U/L (ref 4–36)
ANION GAP SERPL CALCULATED.3IONS-SCNC: 10 MMOL/L (ref 3–16)
AST SERPL-CCNC: 14 U/L (ref 8–33)
BASOPHILS ABSOLUTE: 0.1 K/UL (ref 0–0.1)
BASOPHILS RELATIVE PERCENT: 0.7 %
BILIRUB SERPL-MCNC: 0.6 MG/DL (ref 0.3–1.2)
BUN BLDV-MCNC: 18 MG/DL (ref 6–20)
CALCIUM SERPL-MCNC: 10 MG/DL (ref 8.5–10.5)
CHLORIDE BLD-SCNC: 102 MMOL/L (ref 98–107)
CHOLESTEROL, TOTAL: 196 MG/DL (ref 0–200)
CO2: 26 MMOL/L (ref 20–30)
CREAT SERPL-MCNC: 1.4 MG/DL (ref 0.4–1.2)
EOSINOPHILS ABSOLUTE: 0.3 K/UL (ref 0–0.4)
EOSINOPHILS RELATIVE PERCENT: 3.1 %
GFR AFRICAN AMERICAN: >59
GFR NON-AFRICAN AMERICAN: 50
GLOBULIN: 2.5 G/DL
GLUCOSE BLD-MCNC: 86 MG/DL (ref 74–106)
HCT VFR BLD CALC: 43.7 % (ref 40–54)
HDLC SERPL-MCNC: 40 MG/DL (ref 40–60)
HEMOGLOBIN: 14.1 G/DL (ref 13–18)
HEPATITIS C ANTIBODY INTERPRETATION: NORMAL
IMMATURE GRANULOCYTES #: 0 K/UL
IMMATURE GRANULOCYTES %: 0.3 % (ref 0–5)
LDL CHOLESTEROL CALCULATED: 129 MG/DL
LYMPHOCYTES ABSOLUTE: 1.9 K/UL (ref 1.5–4)
LYMPHOCYTES RELATIVE PERCENT: 21.3 %
MCH RBC QN AUTO: 30.3 PG (ref 27–32)
MCHC RBC AUTO-ENTMCNC: 32.3 G/DL (ref 31–35)
MCV RBC AUTO: 93.8 FL (ref 80–100)
MONOCYTES ABSOLUTE: 0.8 K/UL (ref 0.2–0.8)
MONOCYTES RELATIVE PERCENT: 9.5 %
NEUTROPHILS ABSOLUTE: 5.7 K/UL (ref 2–7.5)
NEUTROPHILS RELATIVE PERCENT: 65.1 %
PDW BLD-RTO: 13.4 % (ref 11–16)
PLATELET # BLD: 243 K/UL (ref 150–400)
PMV BLD AUTO: 11.3 FL (ref 6–10)
POTASSIUM SERPL-SCNC: 4.6 MMOL/L (ref 3.4–5.1)
PROSTATE SPECIFIC ANTIGEN: 1.31 NG/ML (ref 0–4)
RBC # BLD: 4.66 M/UL (ref 4.5–6)
SODIUM BLD-SCNC: 138 MMOL/L (ref 136–145)
TOTAL PROTEIN: 6.6 G/DL (ref 6.4–8.3)
TRIGL SERPL-MCNC: 133 MG/DL (ref 0–249)
VLDLC SERPL CALC-MCNC: 27 MG/DL
WBC # BLD: 8.7 K/UL (ref 4–11)

## 2020-08-19 PROCEDURE — 3017F COLORECTAL CA SCREEN DOC REV: CPT | Performed by: NURSE PRACTITIONER

## 2020-08-19 PROCEDURE — 4040F PNEUMOC VAC/ADMIN/RCVD: CPT | Performed by: NURSE PRACTITIONER

## 2020-08-19 PROCEDURE — G0438 PPPS, INITIAL VISIT: HCPCS | Performed by: NURSE PRACTITIONER

## 2020-08-19 PROCEDURE — 1123F ACP DISCUSS/DSCN MKR DOCD: CPT | Performed by: NURSE PRACTITIONER

## 2020-08-19 PROCEDURE — 80061 LIPID PANEL: CPT

## 2020-08-19 PROCEDURE — 86803 HEPATITIS C AB TEST: CPT

## 2020-08-19 PROCEDURE — 85025 COMPLETE CBC W/AUTO DIFF WBC: CPT

## 2020-08-19 PROCEDURE — 80053 COMPREHEN METABOLIC PANEL: CPT

## 2020-08-19 PROCEDURE — 84153 ASSAY OF PSA TOTAL: CPT

## 2020-08-19 RX ORDER — ROSUVASTATIN CALCIUM 40 MG/1
40 TABLET, COATED ORAL EVERY EVENING
Qty: 90 TABLET | Refills: 3 | Status: SHIPPED | OUTPATIENT
Start: 2020-08-19 | End: 2021-03-16

## 2020-08-19 ASSESSMENT — LIFESTYLE VARIABLES
HAVE YOU OR SOMEONE ELSE BEEN INJURED AS A RESULT OF YOUR DRINKING: 0
HOW OFTEN DURING THE LAST YEAR HAVE YOU FAILED TO DO WHAT WAS NORMALLY EXPECTED FROM YOU BECAUSE OF DRINKING: 0
HOW MANY STANDARD DRINKS CONTAINING ALCOHOL DO YOU HAVE ON A TYPICAL DAY: 0
HOW OFTEN DURING THE LAST YEAR HAVE YOU FOUND THAT YOU WERE NOT ABLE TO STOP DRINKING ONCE YOU HAD STARTED: 0
HOW OFTEN DO YOU HAVE SIX OR MORE DRINKS ON ONE OCCASION: 0
AUDIT-C TOTAL SCORE: 1
HOW OFTEN DURING THE LAST YEAR HAVE YOU BEEN UNABLE TO REMEMBER WHAT HAPPENED THE NIGHT BEFORE BECAUSE YOU HAD BEEN DRINKING: 0
HOW OFTEN DURING THE LAST YEAR HAVE YOU NEEDED AN ALCOHOLIC DRINK FIRST THING IN THE MORNING TO GET YOURSELF GOING AFTER A NIGHT OF HEAVY DRINKING: 0
HOW OFTEN DURING THE LAST YEAR HAVE YOU HAD A FEELING OF GUILT OR REMORSE AFTER DRINKING: 0
HOW OFTEN DO YOU HAVE A DRINK CONTAINING ALCOHOL: 1
AUDIT TOTAL SCORE: 1
HAS A RELATIVE, FRIEND, DOCTOR, OR ANOTHER HEALTH PROFESSIONAL EXPRESSED CONCERN ABOUT YOUR DRINKING OR SUGGESTED YOU CUT DOWN: 0

## 2020-08-19 ASSESSMENT — ENCOUNTER SYMPTOMS
CONSTIPATION: 0
SORE THROAT: 0
EYE DISCHARGE: 0
COUGH: 0
ABDOMINAL PAIN: 0
DIARRHEA: 0
NAUSEA: 0
EYE REDNESS: 0
EYE ITCHING: 0
VOMITING: 0
RHINORRHEA: 0
SHORTNESS OF BREATH: 0

## 2020-08-19 ASSESSMENT — PATIENT HEALTH QUESTIONNAIRE - PHQ9
SUM OF ALL RESPONSES TO PHQ QUESTIONS 1-9: 0
SUM OF ALL RESPONSES TO PHQ QUESTIONS 1-9: 0

## 2020-08-19 NOTE — PROGRESS NOTES
Medicare Annual Wellness Visit  Name: Natividad Allred Date: 2020   MRN: [id number] Sex: Male   Age: 69 y.o. Ethnicity: Non-/Non    : 1945 Race: White      Jose Gay is here for Medicare AWV  He has been feeling well. His leg pain is much improved since he had the angioplasty in his left leg. Home BP has been good. Screenings for behavioral, psychosocial and functional/safety risks, and cognitive dysfunction are all negative except as indicated below. These results, as well as other patient data from the [address] form, are documented in Flowsheets linked to this Encounter. No Known Allergies      Prior to Visit Medications    Medication Sig Taking? Authorizing Provider   rosuvastatin (CRESTOR) 40 MG tablet Take 1 tablet by mouth every evening Yes SHANTA Trujillo   amLODIPine (NORVASC) 5 MG tablet TK 1 T PO QD Yes Historical Provider, MD   clopidogrel (PLAVIX) 75 MG tablet TAKE 1 TABLET BY MOUTH DAILY Yes Historical Provider, MD   sertraline (ZOLOFT) 50 MG tablet TAKE 1 TABLET BY MOUTH ONCE DAILY.  Yes SHANTA Trujillo   lisinopril (PRINIVIL;ZESTRIL) 10 MG tablet Take 10 mg by mouth daily Yes Historical Provider, MD   carvedilol (COREG) 12.5 MG tablet Take 12.5 mg by mouth every 12 hours Yes Historical Provider, MD   aspirin 81 MG tablet Take 81 mg by mouth daily Indications: 2 a day Yes Historical Provider, MD         Past Medical History:   Diagnosis Date    Cancer ([facility]     basal cell carcinoma from left side of nose    Heart attack ([facility]     Hypertension        Past Surgical History:   Procedure Laterality Date    HYDROCELE EXCISION      KNEE SURGERY Left 2018    NECK SURGERY  10/28/2009    SKIN CANCER EXCISION           Family History   Problem Relation Age of Onset    Diabetes Mother        CareTeam (Including outside providers/suppliers regularly involved in providing care):   Patient Care Team:  SHANTA Trujillo as PCP - Interventions:     Substance Abuse:  Social History     Tobacco History     Smoking Status  Current Every Day Smoker Smoking Frequency  0.5 packs/day for 55 years (27.5 pk yrs) Smoking Tobacco Type  Cigarettes    Smokeless Tobacco Use  Never Used          Alcohol History     Alcohol Use Status  No          Drug Use     Drug Use Status  No          Sexual Activity     Sexually Active  Not Asked               Audit Questionnaire: Screen for Alcohol Misuse  How often do you have a drink containing alcohol?: Monthly or less  How many standard drinks containing alcohol do you have on a typical day when drinking?: One or two  How often do you have six or more drinks on one occasion?: Never  Audit-C Score: 1  During the past year, how often have you found that you were not able to stop drinking once you had started?: Never  During the past year, how often have you failed to do what was normally expected of you because of drinking?: Never  During the past year, how often have you needed a drink in the morning to get yourself going after a heavy drinking session?: Never  During the past year, how often have you had a feeling of guilt or remorse after drinking?: Never  During the past year, have you been unable to remember what happened the night before because you had been drinking?: Never  Have you or someone else been injured as a result of your drinking?: No  Has a relative or friend, doctor or health worker been concerned about your drinking or suggested you cut down?: No  Total Score: 1  Substance Abuse Interventions:  · none    General Health:  General  In general, how would you say your health is?: Fair  In the past 7 days, have you experienced any of the following?  New or Increased Pain, New or Increased Fatigue, Loneliness, Social Isolation, Stress or Anger?: (!) New or Increased Pain  Do you get the social and emotional support that you need?: Yes  Do you have a Living Will?: (!) No  General Health Risk vaccine  Aged Out    Hib vaccine  Aged Out    Meningococcal (ACWY) vaccine  Aged Out     Recommendations for [place] [place] Due: see orders and patient instructions/AVS.  . Recommended screening schedule for the next 5-10 years is provided to the patient in written form: see Patient Instructions/AVS.    Vitaly Giron was seen today for medicare awv. Diagnoses and all orders for this visit:    Medicare annual wellness visit, initial  -     Psa screening; Future  -     CBC WITH AUTO DIFFERENTIAL; Future    Colon cancer screening  -     Cologuard; Future    Routine general medical examination at a [facility] facility    Coronary artery disease involving native coronary artery of native heart without angina pectoris  -     CBC WITH AUTO DIFFERENTIAL; Future  -     rosuvastatin (CRESTOR) 40 MG tablet; Take 1 tablet by mouth every evening    Encounter for screening for malignant neoplasm of prostate   -     Psa screening; Future    Abdominal aortic aneurysm (AAA) without rupture ([facility]  -     US Abdominal Aorta Limited; Future    Encounter for hepatitis C screening test for low risk patient  -     HEPATITIS C ANTIBODY; Future        F/U 3 months.

## 2020-08-26 ENCOUNTER — TELEPHONE (OUTPATIENT)
Dept: PRIMARY CARE CLINIC | Age: 75
End: 2020-08-26

## 2020-08-26 ENCOUNTER — HOSPITAL ENCOUNTER (OUTPATIENT)
Dept: ULTRASOUND IMAGING | Facility: HOSPITAL | Age: 75
Discharge: HOME | End: 2020-08-26
Payer: MEDICARE

## 2020-08-26 PROCEDURE — 76775 US EXAM ABDO BACK WALL LIM: CPT

## 2020-08-26 NOTE — TELEPHONE ENCOUNTER
----- Message from SHANTA Corrales sent at 8/26/2020 12:16 PM EDT -----  The aneurysm has gotten larger from when he had the CT done. Does he have a vascular specialist he saw for his legs or was it just cardiology?

## 2020-08-27 NOTE — TELEPHONE ENCOUNTER
Patient called back, read the message to him. He said that he did not have a vascular specialist, only a cardiologist but would like to see a vascular specialist as well.  Call him with further info [phone]

## 2020-10-29 RX ORDER — AMLODIPINE BESYLATE 5 MG/1
TABLET ORAL
Qty: 90 TABLET | Refills: 3 | Status: SHIPPED | OUTPATIENT
Start: 2020-10-29 | End: 2021-10-22

## 2020-11-20 ENCOUNTER — OFFICE VISIT (OUTPATIENT)
Dept: CARDIOLOGY | Facility: CLINIC | Age: 75
End: 2020-11-20

## 2020-11-20 VITALS
HEIGHT: 72 IN | OXYGEN SATURATION: 99 % | SYSTOLIC BLOOD PRESSURE: 130 MMHG | DIASTOLIC BLOOD PRESSURE: 72 MMHG | BODY MASS INDEX: 28.31 KG/M2 | HEART RATE: 70 BPM | WEIGHT: 209 LBS

## 2020-11-20 DIAGNOSIS — E78.5 DYSLIPIDEMIA: ICD-10-CM

## 2020-11-20 DIAGNOSIS — I25.10 CORONARY ARTERY DISEASE INVOLVING NATIVE CORONARY ARTERY OF NATIVE HEART WITHOUT ANGINA PECTORIS: Primary | ICD-10-CM

## 2020-11-20 DIAGNOSIS — I10 ESSENTIAL HYPERTENSION: ICD-10-CM

## 2020-11-20 DIAGNOSIS — I65.23 BILATERAL CAROTID ARTERY STENOSIS: ICD-10-CM

## 2020-11-20 DIAGNOSIS — I35.0 AORTIC VALVE STENOSIS, ETIOLOGY OF CARDIAC VALVE DISEASE UNSPECIFIED: ICD-10-CM

## 2020-11-20 DIAGNOSIS — E78.2 MIXED HYPERLIPIDEMIA: ICD-10-CM

## 2020-11-20 DIAGNOSIS — I73.9 PERIPHERAL VASCULAR DISEASE WITH CLAUDICATION (HCC): ICD-10-CM

## 2020-11-20 PROCEDURE — 99214 OFFICE O/P EST MOD 30 MIN: CPT | Performed by: INTERNAL MEDICINE

## 2020-11-20 RX ORDER — CARVEDILOL 12.5 MG/1
12.5 TABLET ORAL EVERY 12 HOURS
Qty: 180 TABLET | Refills: 3 | Status: SHIPPED | OUTPATIENT
Start: 2020-11-20 | End: 2022-01-20

## 2020-11-20 NOTE — PROGRESS NOTES
[facility] AT [address], [address]  [place], [place], [address]    [phone]  WWW[facility]           OUTPATIENT CLINIC FOLLOW UP NOTE    Patient Care Team:  Patient Care Team:  Sahra Toro APRN as PCP - General (Family Medicine)    Subjective:   Reason for consultation:   Chief Complaint   Patient presents with   • Coronary Artery Disease       HPI:    Thor Ca is a 75 y.o. male.  Problem list:  1. Chest pain, NSTEMI 9/2016 with intermediate LAD disease and no intervention.  Possible spontaneous revascularization  2. PAD  1. chronic total occlusion of the left SFA unsuccessful attempt at antegrade crossing 10/2019.  2. Status post directional atherectomy and drug-coated balloon angioplasty to the left SFA in 1/2020  3. Aortic valve calcification  4. Carotid stenosis  5. Tobacco dependence quit with Chantix in 2019    He presents today for follow-up.    Since the patient was last seen he reports that he has been doing well he.  He notes that since undergoing left SFA intervention in 1/2020 his achiness in his left calf has improved.  He also reports weight loss due to dietary changes.  Denies chest pain, shortness of breath, palpitations, lower extremity edema, lightheadedness, or syncope.    Review of systems  Negative for exertional chest pain, dyspnea with exertion, orthopnea, PND, lower extremity edema, palpitations, lightheadedness, syncope.     PFSH:  Patient Active Problem List   Diagnosis   • Coronary artery disease involving native coronary artery of native heart without angina pectoris   • Essential hypertension   • Mixed hyperlipidemia   • Bilateral carotid artery stenosis   • Peripheral vascular disease with claudication (CMS/[facility])   • CKD (chronic kidney disease) stage 3, GFR 30-59 ml/min         Current Outpatient Medications:   •  amLODIPine (NORVASC) 5 MG tablet, TAKE 1 TABLET BY MOUTH EVERY DAY, Disp: 90 tablet, Rfl: 3  •  aspirin 81 MG  "tablet, Take 1 tablet by mouth Daily., Disp: 30 tablet, Rfl: 11  •  carvedilol (COREG) 12.5 MG tablet, Take 1 tablet by mouth Every 12 (Twelve) Hours., Disp: 180 tablet, Rfl: 1  •  lisinopril (PRINIVIL,ZESTRIL) 10 MG tablet, Take 1 tablet by mouth Daily., Disp: 90 tablet, Rfl: 1  •  rosuvastatin (CRESTOR) 40 MG tablet, Take 40 mg by mouth Every Night., Disp: , Rfl:   •  sertraline (ZOLOFT) 50 MG tablet, Take 50 mg by mouth Daily., Disp: , Rfl:   •  clopidogrel (PLAVIX) 75 MG tablet, Take 1 tablet by mouth Daily., Disp: 30 tablet, Rfl: 5  •  Evolocumab with Infusor (REPATHA) solution cartridge, Inject 3.5 mL under the skin into the appropriate area as directed Every 30 (Thirty) Days., Disp: 3.5 mL, Rfl: 11    No Known Allergies     reports that he has quit smoking. His smoking use included cigarettes. He has a 25.00 pack-year smoking history. He has never used smokeless tobacco.    Family History   Problem Relation Age of Onset   • Heart disease Mother    • Diabetes Mother          Objective:   Blood pressure 130/72, pulse 70, height 182.9 cm (72\"), weight 94.8 kg (209 lb), SpO2 99 %.  CONSTITUTIONAL: No acute distress  RESPIRATORY: Normal effort. Clear to auscultation bilaterally without wheezing or rales  CARDIOVASCULAR: Regular rate and rhythm with normal S1 and S2. Without murmur, gallop or rub.  PERIPHERAL VASCULAR: Normal radial pulse. There is no lower extremity edema bilaterally.  Right dorsalis pedis pulse 1+, faint left dorsalis pedis pulse.  PSYCH: Normal affect and mood      9/2019 exam, prior to intervention: 0+ left DP pulse.  2+ right DP pulse.     Labs:  Lab Results   Component Value Date    ALT 11 01/31/2020    AST 14 01/31/2020     Lab Results   Component Value Date    CHOL 213 (H) 01/31/2020    TRIG 133 08/19/2020    HDL 40 08/19/2020    CREATININE 1.21 02/01/2020       Diagnostic Data:    Procedures    Coronary Angiogram 9/2016  Mild to moderate 2 vessel CAD involving the LAD and Ramus, no culprit " lesion or functionally significant stenosis was found for intervention. Borderline 50% mid LAD stenosis with 0.91 FFR. The patient possibly had a ruptured plaque that has since revascularized.    Peripheral Angiogram 1/2020  · There was a 100% thrombotic calcified chronic total occlusion of the left SFA that is now status post dissection reentry retrograde crossing, directional atherectomy, drug-coated balloon angioplasty.    Peripheral angiogram 10/2019  · There was a 100% chronic total occlusion of the mid SFA.  Attempts to cross the occlusion were unsuccessful.    ABIs 10/2019  Ankle brachial index preexercise - Right DAYSI  0.86, Left DAYSI 0.80  Ankle brachial index post exercise- Right DAYSI  0.86, Left DAYSI 0.73    Carotid duplex 10/2019  · Proximal right internal carotid artery stenosis of 50-69%.  · Proximal left internal carotid artery stenosis of 0-49%.  · Antegrade flow in the vertebral arteries bilaterally    Carotid Duplex US 9/2016  · Proximal right internal carotid artery stenosis of 50-69%.  · Proximal left internal carotid artery stenosis of 0-49%.    TTE E 9/2019  -EF 60 to 65%, grade 1 diastolic dysfunction, mild to moderate LVH, severe aortic calcification without stenosis or regurgitation, mildly dilated aorta root 4.1    TTE 9/2016  · Left ventricular function is normal. Estimated EF = 64%.  · Left ventricular diastolic dysfunction (grade I a) consistent with impaired relaxation.  · Left ventricular wall thickness is consistent with mild concentric hypertrophy.  · All left ventricular wall segments contract normally.  · Mild aortic valve stenosis is present.  · There is no evidence of pericardial effusion.  · Estimated right ventricular systolic pressure from tricuspid regurgitation is normal (<35 mmHg).    Assessment and Plan:     Coronary artery disease involving native coronary artery of native heart without angina pectoris  Mixed hyperlipidemia  -CCS 0, NSTEMI 9/2016, negative iFR of intermediate  LAD disease, no PCI, EF normal  -Myalgias with atorvastatin.   in 8/2020.  -Begin Repatha autoinjector monthly.  -Continue other current medication     PAD  -Left SFA  status post intervention 1/2020  -Continue current medications beginning Repatha as above.  -Exercise ABIs    Aortic calcification  -Without significant regurg and stenosis as of 10/2019  -Continue to monitor clinically     Bruit of right carotid artery  -Moderate disease as of 10/2019  -ASA, statin, lifestyle modifications.  Starting Repatha as above.      Essential hypertension  -We will, continue current related medications.    -Return in about 6 months (around 5/20/2021) for Next scheduled follow up with EKG.    Scribed for Bill Ward MD by ALEXANDRA Wilde 11/20/2020  10:19 EST'    I, Bill Ward MD, personally performed the services as scribed by the above named individual. I have made any necessary edits and it is both accurate and complete.     Bill Ward MD, MSc, FACC, [facility]  Interventional Cardiology  [facility]

## 2020-11-23 RX ORDER — ALIROCUMAB 75 MG/ML
75 INJECTION, SOLUTION SUBCUTANEOUS
Qty: 2 PEN | Refills: 12 | Status: CANCELLED | OUTPATIENT
Start: 2020-11-23

## 2020-11-23 RX ORDER — ALIROCUMAB 75 MG/ML
75 INJECTION, SOLUTION SUBCUTANEOUS
Qty: 2 PEN | Refills: 24 | Status: CANCELLED | OUTPATIENT
Start: 2020-11-23

## 2020-12-01 ENCOUNTER — HOSPITAL ENCOUNTER (OUTPATIENT)
Dept: ULTRASOUND IMAGING | Facility: HOSPITAL | Age: 75
Discharge: HOME | End: 2020-12-01
Payer: MEDICARE

## 2020-12-01 PROCEDURE — 93925 LOWER EXTREMITY STUDY: CPT

## 2020-12-04 ENCOUNTER — TELEPHONE (OUTPATIENT)
Dept: CARDIOLOGY | Facility: CLINIC | Age: 75
End: 2020-12-04

## 2020-12-04 NOTE — TELEPHONE ENCOUNTER
----- Message from Bill Ward MD sent at 12/4/2020  1:21 PM EST -----      ----- Message -----  From: Alicia Kaufman RegSched Rep  Sent: 12/3/2020  12:11 PM EST  To: Bill Ward MD

## 2020-12-04 NOTE — TELEPHONE ENCOUNTER
Per MJS:     Please let the patient know that his ABIs are reassuringly improved.  His flow in the left leg is actually better than the right now.        Patient in office at  Clinic. Review results with patient. All questions answered at this time.

## 2020-12-07 ENCOUNTER — TELEPHONE (OUTPATIENT)
Dept: CARDIOLOGY | Facility: CLINIC | Age: 75
End: 2020-12-07

## 2020-12-07 NOTE — TELEPHONE ENCOUNTER
"Patient called to report that Praulent co-pay is $336.00 for 30 day supply. Patient is currently in the \"donut hole\" with his medicare. Patient advised to wait to fill the praulent prescription in January to see if co-pay is affordable. If not affordable at this time, we will have the patient apply for the patient assistance program. Pt is agreeable to plan.   "

## 2021-01-26 RX ORDER — LISINOPRIL 10 MG/1
10 TABLET ORAL DAILY
Qty: 90 TABLET | Refills: 1 | Status: SHIPPED | OUTPATIENT
Start: 2021-01-26 | End: 2021-07-26

## 2021-03-16 ENCOUNTER — OFFICE VISIT (OUTPATIENT)
Dept: PRIMARY CARE CLINIC | Age: 76
End: 2021-03-16
Payer: MEDICARE

## 2021-03-16 VITALS
HEART RATE: 59 BPM | WEIGHT: 212 LBS | HEIGHT: 72 IN | SYSTOLIC BLOOD PRESSURE: 138 MMHG | OXYGEN SATURATION: 97 % | BODY MASS INDEX: 28.71 KG/M2 | DIASTOLIC BLOOD PRESSURE: 80 MMHG | RESPIRATION RATE: 16 BRPM | TEMPERATURE: 96.6 F

## 2021-03-16 DIAGNOSIS — E78.5 HYPERLIPIDEMIA, UNSPECIFIED HYPERLIPIDEMIA TYPE: ICD-10-CM

## 2021-03-16 DIAGNOSIS — I25.10 CORONARY ARTERY DISEASE INVOLVING NATIVE CORONARY ARTERY OF NATIVE HEART WITHOUT ANGINA PECTORIS: ICD-10-CM

## 2021-03-16 DIAGNOSIS — I10 ESSENTIAL HYPERTENSION: ICD-10-CM

## 2021-03-16 DIAGNOSIS — I73.9 PVD (PERIPHERAL VASCULAR DISEASE) WITH CLAUDICATION (HCC): Primary | ICD-10-CM

## 2021-03-16 PROCEDURE — G8484 FLU IMMUNIZE NO ADMIN: HCPCS | Performed by: NURSE PRACTITIONER

## 2021-03-16 PROCEDURE — G8427 DOCREV CUR MEDS BY ELIG CLIN: HCPCS | Performed by: NURSE PRACTITIONER

## 2021-03-16 PROCEDURE — G8417 CALC BMI ABV UP PARAM F/U: HCPCS | Performed by: NURSE PRACTITIONER

## 2021-03-16 PROCEDURE — 4040F PNEUMOC VAC/ADMIN/RCVD: CPT | Performed by: NURSE PRACTITIONER

## 2021-03-16 PROCEDURE — 1123F ACP DISCUSS/DSCN MKR DOCD: CPT | Performed by: NURSE PRACTITIONER

## 2021-03-16 PROCEDURE — 99213 OFFICE O/P EST LOW 20 MIN: CPT | Performed by: NURSE PRACTITIONER

## 2021-03-16 PROCEDURE — 4004F PT TOBACCO SCREEN RCVD TLK: CPT | Performed by: NURSE PRACTITIONER

## 2021-03-16 PROCEDURE — 3017F COLORECTAL CA SCREEN DOC REV: CPT | Performed by: NURSE PRACTITIONER

## 2021-03-16 RX ORDER — ALIROCUMAB 75 MG/ML
75 INJECTION, SOLUTION SUBCUTANEOUS
COMMUNITY

## 2021-03-16 ASSESSMENT — ENCOUNTER SYMPTOMS
VOMITING: 0
DIARRHEA: 0
ABDOMINAL PAIN: 0
EYE DISCHARGE: 0
EYE ITCHING: 0
SHORTNESS OF BREATH: 0
CONSTIPATION: 0
COUGH: 0
NAUSEA: 0
RHINORRHEA: 0
EYE REDNESS: 0
SORE THROAT: 0

## 2021-03-16 ASSESSMENT — PATIENT HEALTH QUESTIONNAIRE - PHQ9
SUM OF ALL RESPONSES TO PHQ QUESTIONS 1-9: 0
2. FEELING DOWN, DEPRESSED OR HOPELESS: 0
SUM OF ALL RESPONSES TO PHQ QUESTIONS 1-9: 0
SUM OF ALL RESPONSES TO PHQ QUESTIONS 1-9: 0

## 2021-03-16 NOTE — PROGRESS NOTES
Have you seen any other physician or provider since your last visit? No    Have you had any other diagnostic tests since your last visit? No    Have you changed or stopped any medications since your last visit? No    SUBJECTIVE:    Patient ID: Mir Berg is a 70 y.o. male. Medical History Review  Past Medical, Family, and Social History reviewed. Health Maintenance Due   Topic Date Due    COVID-19 Vaccine (1) Never done    DTaP/Tdap/Td vaccine (1 - Tdap) Never done    Shingles Vaccine (1 of 2) Never done    Colon Cancer Screen FIT/FOBT  12/28/2018    Flu vaccine (1) 09/01/2020       HPI:   Chief Complaint   Patient presents with    Hypertension    Hyperlipidemia    Coronary Artery Disease     Pt f/u on HTN, hyperlipidemia and CAD. He has been feeling well. His legs are better since his procedure. Patient's medications, allergies, past medical, surgical, social and family histories were reviewed and updated as appropriate. Review of Systems   Constitutional: Negative for chills, fatigue and fever. HENT: Negative for congestion, ear pain, rhinorrhea and sore throat. Eyes: Negative for discharge, redness and itching. Respiratory: Negative for cough and shortness of breath. Cardiovascular: Negative for chest pain, palpitations and leg swelling. Gastrointestinal: Negative for abdominal pain, constipation, diarrhea, nausea and vomiting. Endocrine: Negative for cold intolerance and heat intolerance. Genitourinary: Negative for dysuria. Musculoskeletal: Negative for arthralgias and joint swelling. Skin: Negative for rash and wound. Neurological: Negative for weakness and headaches. Hematological: Negative for adenopathy. Psychiatric/Behavioral: Negative for dysphoric mood and sleep disturbance. The patient is not nervous/anxious. Reviewed and acurate. See MA note.     OBJECTIVE:  /80 (Site: Right Upper Arm, Position: Sitting)   Pulse 59   Temp 96.6 °F (35.9 °C) (Temporal)   Resp 16   Ht 6' (1.829 m)   Wt 212 lb (96.2 kg)   SpO2 97% Comment: room air  BMI 28.75 kg/m²    Physical Exam  Constitutional:       Appearance: He is well-developed. HENT:      Head: Normocephalic and atraumatic. Right Ear: External ear normal.      Left Ear: External ear normal.   Eyes:      Conjunctiva/sclera: Conjunctivae normal.      Pupils: Pupils are equal, round, and reactive to light. Neck:      Musculoskeletal: Neck supple. Thyroid: No thyromegaly. Vascular: No JVD. Cardiovascular:      Rate and Rhythm: Normal rate and regular rhythm. Heart sounds: Murmur present. No friction rub. No gallop. Pulmonary:      Effort: Pulmonary effort is normal. No respiratory distress. Breath sounds: Normal breath sounds. Abdominal:      General: Bowel sounds are normal. There is no distension. Palpations: Abdomen is soft. Tenderness: There is no abdominal tenderness. Musculoskeletal: Normal range of motion. Lymphadenopathy:      Cervical: No cervical adenopathy. Skin:     General: Skin is warm and dry. Neurological:      Mental Status: He is alert and oriented to person, place, and time. Cranial Nerves: No cranial nerve deficit. No results found for requested labs within last 30 days. Hemoglobin A1C (%)   Date Value   05/04/2018 5.3     LDL Calculated (mg/dL)   Date Value   08/19/2020 129       Lab Results   Component Value Date    WBC 8.7 08/19/2020    NEUTROABS 5.7 08/19/2020    HGB 14.1 08/19/2020    HCT 43.7 08/19/2020    MCV 93.8 08/19/2020     08/19/2020     Lab Results   Component Value Date    TSH 1.57 09/13/2013       Prior to Visit Medications    Medication Sig Taking? Authorizing Provider   NONFORMULARY Cholesterol shot.  Twice a month Yes Historical Provider, MD   alirocumab (PRALUENT) 75 MG/ML SOAJ injection pen Inject 75 mg into the skin every 14 days Yes Historical Provider, MD   sertraline (ZOLOFT) 50 MG tablet TAKE 1 TABLET BY MOUTH EVERY DAY Yes SHANTA Pierce   carvedilol (COREG) 12.5 MG tablet Take 12.5 mg by mouth every 12 hours Yes Historical Provider, MD   aspirin 81 MG tablet Take 81 mg by mouth daily Indications: 2 a day Yes Historical Provider, MD   amLODIPine (NORVASC) 5 MG tablet TK 1 T PO QD  Historical Provider, MD   lisinopril (PRINIVIL;ZESTRIL) 10 MG tablet Take 10 mg by mouth daily  Historical Provider, MD       ASSESSMENT:  1. PVD (peripheral vascular disease) with claudication ([facility] [address]    2. Essential hypertension    3. Hyperlipidemia, unspecified hyperlipidemia type    4. Coronary artery disease involving native coronary artery of native heart without angina pectoris        PLAN:  No orders of the defined types were placed in this encounter. No orders of the defined types were placed in this encounter. Patient Instructions   · Keep a list of your medicines with you. List all of the prescription medicines, nonprescription medicines, supplements, natural remedies, and vitamins that you take. Tell your healthcare providers who treat you about all of the products you are taking. Your provider can provide you with a form to keep track of them. Just ask. · Follow the directions that come with your medicine, including information about food or alcohol. Make sure you know how and when to take your medicine. Do not take more or less than you are supposed to take. · Keep all medicines out of the reach of children. · Store medicines according to the directions on the label. · Monitor yourself. Learn to know how your body reacts to your new medicine and keep track of how it makes you feel before attempting (If your provider has allowed you to do so) to drive or go to work. · Seek emergency medical attention if you think you have used too much of this medicine.  An overdose of any prescription medicine can be fatal. Overdose symptoms may include extreme drowsiness, muscle weakness, confusion, cold and clammy skin, pinpoint pupils, shallow breathing, slow heart rate, fainting, or coma. · Don't share prescription medicines with others, even when they seem to have the same symptoms. What may be good for you may be harmful to others. · If you are no longer taking a prescribed medication and you have pills left please take your pills out of their original containers. Mix crushed pills with an undesirable substance, such as cat litter or used coffee grounds. Put the mixture into a disposable container with a lid, such as an empty margarine tub, or into a sealable bag. Cover up or remove any of your personal information on the empty containers by covering it with black permanent marker or duct tape. Place the sealed container with the mixture, and the empty drug containers, in the trash. · If you use a medication that is in the form of a patch, dispose of used patches by folding them in half so that the sticky sides meet, and then flushing them down a toilet. They should not be placed in the household trash where children or pets can find them. · If you have any questions, ask your provider or pharmacist for more information. · Be sure to keep all appointments for provider visits or tests. We are committed to providing you with the best care possible. In order to help us achieve these goals please remember to bring all medications, herbal products, and over the counter supplements with you to each visit. If your provider has ordered testing for you, please be sure to follow up with our office if you have not received results within 7 days after the testing took place. *If you receive a survey after visiting one of our offices, please take time to share your experience concerning your physician office visit. These surveys are confidential and no health information about you is shared. We are eager to improve for you and we are counting on your feedback to help make that happen.   Thank you Your doctor can also discuss with you all of your options and refer you to smoking-cessation support groups. You may wish to use nicotine replacement (gum, patches, inhaler) or one of the prescription medications that have been shown to increase quit rates and prolong abstinence from smoking. But whatever you and your doctor decide on these matters, it will still be you who decides when an how to quit. Here are some techniques:   Switch Brands   Switch to a brand you find distasteful. Change to a brand that is low in tar and nicotine a couple of weeks before your target quit date. This will help change your smoking behavior. However, do not smoke more cigarettes, inhale them more often or more deeply, or place your fingertips over the holes in the filters. All of these actions will increase your nicotine intake, and the idea is to get your body used to functioning without nicotine. Cut Down the Number of Cigarettes You Smoke   Smoke only half of each cigarette. Each day, postpone the lighting of your first cigarette by one hour. Decide you'll only smoke during odd or even hours of the day. Decide beforehand how many cigarettes you'll smoke during the day. For each additional cigarette, give a dollar to your favorite neha. Change your eating habits to help you cut down. For example, drink milk, which many people consider incompatible with smoking. End meals or snacks with something that won't lead to a cigarette. Reach for a glass of juice instead of a cigarette for a \"pick-me-up. \"   Remember: Cutting down can help you quit, but it's not a substitute for quitting. If you're down to about seven cigarettes a day, it's time to set your target quit date, and get ready to stick to it. Don't Smoke \"Automatically\"   Smoke only those cigarettes you really want. Catch yourself before you light up a cigarette out of pure habit. Don't empty your ashtrays.  This will remind you of how many cigarettes you've smoked each day, and the sight and the smell of stale cigarettes butts will be very unpleasant. Make yourself aware of each cigarette by using the opposite hand or putting cigarettes in an unfamiliar location or a different pocket to break the automatic reach. If you light up many times during the day without even thinking about it, try to look in a mirror each time you put a match to your cigarette. You may decide you don't need it. Make Smoking Inconvenient   Stop buying cigarettes by the carton. Wait until one pack is empty before you buy another. Stop carrying cigarettes with you at home or at work. Make them difficult to get to. Make Smoking Unpleasant   Smoke only under circumstances that aren't especially pleasurable for you. If you like to smoke with others, smoke alone. Turn your chair to an empty corner and focus only on the cigarette you are smoking and all its many negative effects. Collect all your cigarette butts in one large glass container as a visual reminder of the filth made by smoking. Just Before Quitting   Practice going without cigarettes. Don't think of never smoking again. Think of quitting in terms of one day at a time . Tell yourself you won't smoke today, and then don't. Clean your clothes to rid them of the cigarette smell, which can linger a long time. On the Day You Quit   Throw away all your cigarettes and matches. Hide your lighters and ashtrays. Visit the dentist and have your teeth cleaned to get rid of tobacco stains. Notice how nice they look and resolve to keep them that way. Make a list of things you'd like to buy for yourself or someone else. Estimate the cost in terms of packs of cigarettes, and put the money aside to buy these presents. Keep very busy on the big day. Go to the movies, exercise, take long walks, or go bike riding.    Remind your family and friends that this is your quit date, and ask them to help you over the rough spots of the first couple of days and weeks. Buy yourself a treat or do something special to celebrate. Telephone and Internet Support   Telephone, web-, and computer-based programs can offer you the support that you need to quit and to stay smoke-free. You can find many programs online, like the [employer] Association's [place] from Smoking . Immediately After Quitting   Develop a clean, fresh, nonsmoking environment around yourselfat work and at home. Buy yourself flowersyou may be surprised how much you can enjoy their scent now. The first few days after you quit, spend as much free time as possible in places where smoking isn't allowed, such as [address] [place] [address], museums, theaters, department stores, and churches. Drink large quantities of water and fruit juice (but avoid sodas that contain caffeine). Try to avoid alcohol, coffee, and other beverages that you associate with cigarette smoking. Strike up conversation instead of a match for a cigarette. If you miss the sensation of having a cigarette in your hand, play with something elsea pencil, a paper clip, a marble. If you miss having something in your mouth, try toothpicks or a fake cigarette. Return in about 6 months (around 9/16/2021). Ham Murillo CMA am scribing for and in the presence of SHANTA Dsouza on 3/29/2021 at 7:20 PM.      I, China WOMACK, personally performed the services described in the documentation as scribed by Amelia Dawson CMA, in my presence and it is both accurate and complete.

## 2021-03-16 NOTE — PATIENT INSTRUCTIONS
· Keep a list of your medicines with you. List all of the prescription medicines, nonprescription medicines, supplements, natural remedies, and vitamins that you take. Tell your healthcare providers who treat you about all of the products you are taking. Your provider can provide you with a form to keep track of them. Just ask. · Follow the directions that come with your medicine, including information about food or alcohol. Make sure you know how and when to take your medicine. Do not take more or less than you are supposed to take. · Keep all medicines out of the reach of children. · Store medicines according to the directions on the label. · Monitor yourself. Learn to know how your body reacts to your new medicine and keep track of how it makes you feel before attempting (If your provider has allowed you to do so) to drive or go to work. · Seek emergency medical attention if you think you have used too much of this medicine. An overdose of any prescription medicine can be fatal. Overdose symptoms may include extreme drowsiness, muscle weakness, confusion, cold and clammy skin, pinpoint pupils, shallow breathing, slow heart rate, fainting, or coma. · Don't share prescription medicines with others, even when they seem to have the same symptoms. What may be good for you may be harmful to others. · If you are no longer taking a prescribed medication and you have pills left please take your pills out of their original containers. Mix crushed pills with an undesirable substance, such as cat litter or used coffee grounds. Put the mixture into a disposable container with a lid, such as an empty margarine tub, or into a sealable bag. Cover up or remove any of your personal information on the empty containers by covering it with black permanent marker or duct tape. Place the sealed container with the mixture, and the empty drug containers, in the trash.    · If you use a medication that is in the form of a patch, dispose of used patches by folding them in half so that the sticky sides meet, and then flushing them down a toilet. They should not be placed in the household trash where children or pets can find them. · If you have any questions, ask your provider or pharmacist for more information. · Be sure to keep all appointments for provider visits or tests. We are committed to providing you with the best care possible. In order to help us achieve these goals please remember to bring all medications, herbal products, and over the counter supplements with you to each visit. If your provider has ordered testing for you, please be sure to follow up with our office if you have not received results within 7 days after the testing took place. *If you receive a survey after visiting one of our offices, please take time to share your experience concerning your physician office visit. These surveys are confidential and no health information about you is shared. We are eager to improve for you and we are counting on your feedback to help make that happen. Thank you for requesting your Continuity of Care Document (CCD) electronically. Please follow the instructions below to securely access your online medical record. [employer] allows you to send messages to your doctor, view your test results, renew your prescriptions, schedule appointments, and more. How Do I Access my CCD? In your Internet browser, go to [email or web address] Enter your user name and password   Click on My medical Record  --> Download Summary --> Enter Password --> Download --> Save or Open Document    Additional Information  If you have questions, please contact your physician practice where you receive care. Remember, [employer] is NOT to be used for urgent needs. For medical emergencies, dial 911. ips to Help You Stop Smoking       Cigarette smoking is a preventable cause of death in the [place].  If you have thought about quitting but haven't been able to, here are some reasons why you should and some ways to do it. Here's Why   Quitting smoking now can decrease your risk of getting smoking-related illnesses like:   Heart disease   Stroke   Several types of cancer, including:   Lung   Mouth   Esophagus   Larynx   Bladder   Pancreas   Kidney   Chronic lung diseases:   Bronchitis   Emphysema   Asthma   Cataracts   Macular degeneration   Thyroid conditions   Hearing loss   Erectile dysfunction   Dementia   Osteoporosis   Here's How   Once you've decided to quit smoking, set your target quit date a few weeks away. In the time leading up to your quit day, try some of these ideas offered by the [address] to help you successfully quit smoking. For the best results, work with your doctor. Together, you can test your lung function and compare the results to those of a nonsmoking person. The results can be given to you as your lung age. Finding out your lung age right after having the test done may help you to stop smoking. Your doctor can also discuss with you all of your options and refer you to smoking-cessation support groups. You may wish to use nicotine replacement (gum, patches, inhaler) or one of the prescription medications that have been shown to increase quit rates and prolong abstinence from smoking. But whatever you and your doctor decide on these matters, it will still be you who decides when an how to quit. Here are some techniques:   Switch Brands   Switch to a brand you find distasteful. Change to a brand that is low in tar and nicotine a couple of weeks before your target quit date. This will help change your smoking behavior. However, do not smoke more cigarettes, inhale them more often or more deeply, or place your fingertips over the holes in the filters.  All of these actions will increase your nicotine intake, and the idea is to get your body used to you are smoking and all its many negative effects. Collect all your cigarette butts in one large glass container as a visual reminder of the filth made by smoking. Just Before Quitting   Practice going without cigarettes. Don't think of never smoking again. Think of quitting in terms of one day at a time . Tell yourself you won't smoke today, and then don't. Clean your clothes to rid them of the cigarette smell, which can linger a long time. On the Day You Quit   Throw away all your cigarettes and matches. Hide your lighters and ashtrays. Visit the dentist and have your teeth cleaned to get rid of tobacco stains. Notice how nice they look and resolve to keep them that way. Make a list of things you'd like to buy for yourself or someone else. Estimate the cost in terms of packs of cigarettes, and put the money aside to buy these presents. Keep very busy on the big day. Go to the movies, exercise, take long walks, or go bike riding. Remind your family and friends that this is your quit date, and ask them to help you over the rough spots of the first couple of days and weeks. Buy yourself a treat or do something special to celebrate. Telephone and Internet Support   Telephone, web-, and computer-based programs can offer you the support that you need to quit and to stay smoke-free. You can find many programs online, like the [employer] Association's [place] from Smoking . Immediately After Quitting   Develop a clean, fresh, nonsmoking environment around yourselfat work and at home. Buy yourself flowersyou may be surprised how much you can enjoy their scent now. The first few days after you quit, spend as much free time as possible in places where smoking isn't allowed, such as [address] [place] [address], museums, theaters, department stores, and churches. Drink large quantities of water and fruit juice (but avoid sodas that contain caffeine).    Try to avoid alcohol, coffee, and other beverages that you

## 2021-05-21 ENCOUNTER — HOSPITAL ENCOUNTER (OUTPATIENT)
Facility: HOSPITAL | Age: 76
Discharge: HOME | End: 2021-05-21
Payer: MEDICARE

## 2021-05-21 ENCOUNTER — OFFICE VISIT (OUTPATIENT)
Dept: CARDIOLOGY | Facility: CLINIC | Age: 76
End: 2021-05-21

## 2021-05-21 VITALS
OXYGEN SATURATION: 98 % | HEART RATE: 82 BPM | SYSTOLIC BLOOD PRESSURE: 152 MMHG | BODY MASS INDEX: 29.12 KG/M2 | WEIGHT: 215 LBS | HEIGHT: 72 IN | DIASTOLIC BLOOD PRESSURE: 68 MMHG

## 2021-05-21 DIAGNOSIS — I73.9 PERIPHERAL VASCULAR DISEASE WITH CLAUDICATION (HCC): ICD-10-CM

## 2021-05-21 DIAGNOSIS — I70.0 AORTIC CALCIFICATION (HCC): ICD-10-CM

## 2021-05-21 DIAGNOSIS — I25.10 CORONARY ARTERY DISEASE INVOLVING NATIVE CORONARY ARTERY OF NATIVE HEART WITHOUT ANGINA PECTORIS: Primary | ICD-10-CM

## 2021-05-21 DIAGNOSIS — I10 ESSENTIAL HYPERTENSION: ICD-10-CM

## 2021-05-21 DIAGNOSIS — E78.2 MIXED HYPERLIPIDEMIA: ICD-10-CM

## 2021-05-21 DIAGNOSIS — I65.23 BILATERAL CAROTID ARTERY STENOSIS: ICD-10-CM

## 2021-05-21 LAB
ALBUMIN SERPL-MCNC: 3.8 G/DL (ref 3.4–4.8)
ALP BLD-CCNC: 88 U/L (ref 25–100)
ALT SERPL-CCNC: 9 U/L (ref 4–36)
AST SERPL-CCNC: 15 U/L (ref 8–33)
BILIRUB SERPL-MCNC: 0.4 MG/DL (ref 0.3–1.2)
BILIRUBIN DIRECT: <0.2 MG/DL (ref 0–0.2)
BILIRUBIN, INDIRECT: NORMAL MG/DL (ref 0.2–0.8)
CHOLESTEROL, TOTAL: 129 MG/DL (ref 0–200)
HDLC SERPL-MCNC: 46 MG/DL (ref 40–60)
LDL CHOLESTEROL CALCULATED: 65 MG/DL
TOTAL PROTEIN: 7 G/DL (ref 6.4–8.3)
TRIGL SERPL-MCNC: 91 MG/DL (ref 0–249)
VLDLC SERPL CALC-MCNC: 18 MG/DL

## 2021-05-21 PROCEDURE — 93000 ELECTROCARDIOGRAM COMPLETE: CPT | Performed by: INTERNAL MEDICINE

## 2021-05-21 PROCEDURE — 80061 LIPID PANEL: CPT

## 2021-05-21 PROCEDURE — 36415 COLL VENOUS BLD VENIPUNCTURE: CPT

## 2021-05-21 PROCEDURE — 99214 OFFICE O/P EST MOD 30 MIN: CPT | Performed by: INTERNAL MEDICINE

## 2021-05-21 PROCEDURE — 93005 ELECTROCARDIOGRAM TRACING: CPT

## 2021-05-21 PROCEDURE — 80076 HEPATIC FUNCTION PANEL: CPT

## 2021-05-21 NOTE — PROGRESS NOTES
[facility] [address], [address]  [place], [place], [address]    [phone]  WWW.[facility]           OUTPATIENT CLINIC FOLLOW UP NOTE    Patient Care Team:  Patient Care Team:  Sahra Toro APRN as PCP - General (Family Medicine)    Subjective:   Reason for consultation:   Chief Complaint   Patient presents with   • Coronary Artery Disease       HPI:    Thor Ca is a 75 y.o. male.  Problem list:  1. Chest pain, NSTEMI 9/2016 with intermediate LAD disease and no intervention.  Possible spontaneous revascularization  2. PAD  1. chronic total occlusion of the left SFA unsuccessful attempt at antegrade crossing 10/2019.  2. Status post directional atherectomy and drug-coated balloon angioplasty to the left SFA in 1/2020  3. Aortic valve calcification  4. Carotid stenosis  5. Tobacco dependence quit with Chantix in 2019    He presents today for follow-up.    Since patient was last seen he reports he has been doing well from a cardiac standpoint.  He denies chest pain, shortness of breath, or palpitations.  He denies claudication.  He has been taking Repatha since his last visit.  His blood pressure has been stable at home.    Review of systems  Negative for exertional chest pain, dyspnea with exertion, orthopnea, PND, lower extremity edema, palpitations, lightheadedness, syncope or claudication.     PFSH:  Patient Active Problem List   Diagnosis   • Coronary artery disease involving native coronary artery of native heart without angina pectoris   • Essential hypertension   • Mixed hyperlipidemia   • Bilateral carotid artery stenosis   • Peripheral vascular disease with claudication (CMS/[facility])   • CKD (chronic kidney disease) stage 3, GFR 30-59 ml/min (CMS/[facility])         Current Outpatient Medications:   •  Alirocumab 75 MG/ML solution auto-injector, Inject 75 mg under the skin into the appropriate area as directed Every 14 (Fourteen) Days., Disp: 2 pen, Rfl:  "11  •  amLODIPine (NORVASC) 5 MG tablet, TAKE 1 TABLET BY MOUTH EVERY DAY, Disp: 90 tablet, Rfl: 3  •  aspirin 81 MG tablet, Take 1 tablet by mouth Daily., Disp: 30 tablet, Rfl: 11  •  carvedilol (COREG) 12.5 MG tablet, Take 1 tablet by mouth Every 12 (Twelve) Hours., Disp: 180 tablet, Rfl: 3  •  lisinopril (PRINIVIL,ZESTRIL) 10 MG tablet, Take 1 tablet by mouth Daily., Disp: 90 tablet, Rfl: 1  •  rosuvastatin (CRESTOR) 40 MG tablet, Take 40 mg by mouth Every Night., Disp: , Rfl:     No Known Allergies     reports that he has quit smoking. His smoking use included cigarettes. He has a 25.00 pack-year smoking history. He has never used smokeless tobacco.    Family History   Problem Relation Age of Onset   • Heart disease Mother    • Diabetes Mother          Objective:   Blood pressure 152/68, pulse 82, height 182.9 cm (72\"), weight 97.5 kg (215 lb), SpO2 98 %.  CONSTITUTIONAL: No acute distress  RESPIRATORY: Normal effort. Clear to auscultation bilaterally without wheezing or rales  CARDIOVASCULAR: Regular rate and rhythm with normal S1 and S2. Without gallop or rub.  Systolic murmur at the sternal border.  Right carotid bruit.  PERIPHERAL VASCULAR: Normal radial pulse. There is no lower extremity edema bilaterally.    PSYCH: Normal affect and mood      11/2021 exam: Right dorsalis pedis pulse 1+, faint left dorsalis pedis pulse.  9/2019 exam, prior to intervention: 0+ left DP pulse.  2+ right DP pulse.     Labs:  Lab Results   Component Value Date    ALT 11 01/31/2020    AST 14 01/31/2020     Lab Results   Component Value Date    CHOL 213 (H) 01/31/2020    TRIG 133 08/19/2020    HDL 40 08/19/2020    CREATININE 1.21 02/01/2020       Diagnostic Data:      ECG 12 Lead    Date/Time: 5/21/2021 9:55 AM  Performed by: Bill Ward MD  Authorized by: Bill Ward MD   Comparison: compared with previous ECG from 9/16/2016  Rhythm: sinus rhythm  Rate: normal  BPM: 62  Conduction: 1st degree AV block  Comments: QRS 74 ms, "  ms            Coronary Angiogram 9/2016  Mild to moderate 2 vessel CAD involving the LAD and Ramus, no culprit lesion or functionally significant stenosis was found for intervention. Borderline 50% mid LAD stenosis with 0.91 FFR. The patient possibly had a ruptured plaque that has since revascularized.    Peripheral Angiogram 1/2020  · There was a 100% thrombotic calcified chronic total occlusion of the left SFA that is now status post dissection reentry retrograde crossing, directional atherectomy, drug-coated balloon angioplasty.    Peripheral angiogram 10/2019  · There was a 100% chronic total occlusion of the mid SFA.  Attempts to cross the occlusion were unsuccessful.    ABIs 10/2019  Ankle brachial index preexercise - Right DAYSI  0.86, Left DAYSI 0.80  Ankle brachial index post exercise- Right DAYSI  0.86, Left DAYSI 0.73    Carotid duplex 10/2019  · Proximal right internal carotid artery stenosis of 50-69%.  · Proximal left internal carotid artery stenosis of 0-49%.  · Antegrade flow in the vertebral arteries bilaterally    Carotid Duplex US 9/2016  · Proximal right internal carotid artery stenosis of 50-69%.  · Proximal left internal carotid artery stenosis of 0-49%.    TTE E 9/2019  -EF 60 to 65%, grade 1 diastolic dysfunction, mild to moderate LVH, severe aortic calcification without stenosis or regurgitation, mildly dilated aorta root 4.1    TTE 9/2016  · Left ventricular function is normal. Estimated EF = 64%.  · Left ventricular diastolic dysfunction (grade I a) consistent with impaired relaxation.  · Left ventricular wall thickness is consistent with mild concentric hypertrophy.  · All left ventricular wall segments contract normally.  · Mild aortic valve stenosis is present.  · There is no evidence of pericardial effusion.  · Estimated right ventricular systolic pressure from tricuspid regurgitation is normal (<35 mmHg).    Assessment and Plan:     Coronary artery disease involving native coronary  artery of native heart without angina pectoris  Mixed hyperlipidemia  -CCS 0, NSTEMI 9/2016, negative iFR of intermediate LAD disease, no PCI, EF normal  -Myalgias with atorvastatin.  -Continue aspirin, amlodipine, Coreg, pathologic, and Crestor.  -Repeat lipid panel and LFTs.      PAD  -Left SFA  status post intervention 1/2020  -Continue aspirin, statin, Repatha  -We will consider repeat ABIs in 2022    Aortic calcification  -Without significant regurg and stenosis as of 10/2019  -We will plan for repeat TTE in 2022     Bruit of right carotid artery  -Moderate disease as of 10/2019  -Continue aspirin, statin, Repatha.  -We will plan for repeat carotid duplex in 2022      Essential hypertension  -Continue current related medications    -Return in about 6 months (around 11/21/2021) for Next scheduled follow up.    Scribed for Bill Ward MD by ALEXANDRA Wilde. 5/21/2021  17:17 EDT    I, Bill Ward MD, personally performed the services as scribed by the above named individual. I have made any necessary edits and it is both accurate and complete.     Bill Ward MD, MSc, FACC, [facility]  Interventional Cardiology  [facility]

## 2021-07-26 RX ORDER — LISINOPRIL 10 MG/1
10 TABLET ORAL DAILY
Qty: 90 TABLET | Refills: 1 | Status: SHIPPED | OUTPATIENT
Start: 2021-07-26 | End: 2022-01-20

## 2021-07-26 NOTE — TELEPHONE ENCOUNTER
Refill request lisinopril 10mg daily.  Please sign.    Lab Results   Component Value Date    GLUCOSE 119 (H) 02/01/2020    CALCIUM 9.5 02/01/2020     02/01/2020    K 4.5 02/01/2020    CO2 22.0 02/01/2020     02/01/2020    BUN 22 02/01/2020    CREATININE 1.21 02/01/2020    EGFRIFNONA 59 (L) 02/01/2020    BCR 18.2 02/01/2020    ANIONGAP 12.0 02/01/2020

## 2021-08-20 NOTE — PROGRESS NOTES
Health Maintenance Due This Visit   Colonoscopy No   Mammogram No   Annual Wellness Visit Yes   Microalbumin No   HgbA1C No   Diabetic Eye Exam No    House Bill One Due This Visit   KANG No   UDS No   Contract No    Chief Complaint   Patient presents with    Medicare AWV     Pt here today for AWV    Have you seen any other physician or provider since your last visit Yes- cardiology    Have you had any other diagnostic tests since your last visit? no    Have you changed or stopped any medications since your last visit? no     Medicare Annual Wellness Visit  Name: Sheela Ibarra Date: 2021   MRN: [id number] Sex: Male   Age: 70 y.o. Ethnicity: Non- / Non    : 1945 Race: White (non-)      Jose Magdaleno is here for Medicare AWV    He has been doing well. Cardiology started him on Praluent. He tolerates them well. He has aching in his legs and his toes stay numb- started . He does not have to see cardiology again for a year. Screenings for behavioral, psychosocial and functional/safety risks, and cognitive dysfunction are all negative except as indicated below. These results, as well as other patient data from the [address] [facility] form, are documented in Flowsheets linked to this Encounter. No Known Allergies    Prior to Visit Medications    Medication Sig Taking? Authorizing Provider   NONFORMULARY Cholesterol shot.  Twice a month Yes Historical Provider, MD   alirocumab (PRALUENT) 75 MG/ML SOAJ injection pen Inject 75 mg into the skin every 14 days Yes Historical Provider, MD   amLODIPine (NORVASC) 5 MG tablet TK 1 T PO QD Yes Historical Provider, MD   carvedilol (COREG) 12.5 MG tablet Take 12.5 mg by mouth every 12 hours Yes Historical Provider, MD   aspirin 81 MG tablet Take 81 mg by mouth daily Indications: 2 a day Yes Historical Provider, MD   sertraline (ZOLOFT) 50 MG tablet TAKE 1 TABLET BY MOUTH EVERY DAY  Patient not taking: Reported on 8/24/2021  SHANTA Sanchez   lisinopril (PRINIVIL;ZESTRIL) 10 MG tablet Take 10 mg by mouth daily  Patient not taking: Reported on 8/24/2021  Historical Provider, MD       Past Medical History:   Diagnosis Date    Cancer ([facility] [address]     basal cell carcinoma from left side of nose    Heart attack ([facility] [address]     Hypertension      Past Surgical History:   Procedure Laterality Date    HYDROCELE EXCISION      KNEE SURGERY Left 05/2018    NECK SURGERY  10/28/2009    SKIN CANCER EXCISION         Family History   Problem Relation Age of Onset    Diabetes Mother      Review of Systems   Constitutional: Negative for chills and fever. HENT: Negative for ear pain and sore throat. Eyes: Negative for pain and visual disturbance. Respiratory: Negative for cough and shortness of breath. Cardiovascular: Negative for chest pain, palpitations and leg swelling. Gastrointestinal: Negative for abdominal pain, nausea and vomiting. Genitourinary: Negative for dysuria and hematuria. Musculoskeletal: Negative for joint swelling. Skin: Negative for rash. Neurological: Negative for dizziness and weakness. Psychiatric/Behavioral: Negative for sleep disturbance. CareTeam (Including outside providers/suppliers regularly involved in providing care):   Patient Care Team:  SHANTA Sanchez as PCP - General  SHANTA Sanchez as PCP - [facility] Shweta Goldberg Provider    Wt Readings from Last 3 Encounters:   08/24/21 207 lb 12.8 oz (94.3 kg)   03/16/21 212 lb (96.2 kg)   08/19/20 212 lb (96.2 kg)     Vitals:    08/24/21 0942 08/24/21 1022   BP: (!) 142/78 138/80   Site: Right Upper Arm    Position: Sitting    Pulse: 61    Temp: 97.5 °F (36.4 °C)    TempSrc: Temporal    SpO2: 98%    Weight: 207 lb 12.8 oz (94.3 kg)    Height: 6' (1.829 m)      Body mass index is 28.18 kg/m². Based upon direct observation of the patient, evaluation of cognition reveals recent and remote memory intact. Physical Exam  Vitals reviewed. Constitutional:       General: He is not in acute distress. Appearance: He is well-developed. HENT:      Head: Normocephalic. Mouth/Throat:      Pharynx: No oropharyngeal exudate. Eyes:      General: Lids are normal.   Cardiovascular:      Rate and Rhythm: Normal rate and regular rhythm. Heart sounds: Normal heart sounds. Pulmonary:      Effort: Pulmonary effort is normal.      Breath sounds: Normal breath sounds. Abdominal:      General: Bowel sounds are normal. There is no distension. Palpations: Abdomen is soft. Tenderness: There is no abdominal tenderness. Musculoskeletal:      Cervical back: Neck supple. Lymphadenopathy:      Cervical: No cervical adenopathy. Skin:     General: Skin is warm and dry. Neurological:      Mental Status: He is alert and oriented to person, place, and time. Lab Results   Component Value Date     08/24/2021    K 4.5 08/24/2021    K 4.7 06/16/2018     08/24/2021    CO2 25 08/24/2021    GLUCOSE 82 08/24/2021    BUN 21 08/24/2021    CREATININE 1.4 08/24/2021    CALCIUM 9.5 08/24/2021    PROT 6.6 08/24/2021    LABALBU 3.9 08/24/2021    BILITOT 0.3 08/24/2021    ALT 11 08/24/2021    AST 15 08/24/2021       Hemoglobin A1C (%)   Date Value   05/04/2018 5.3     LDL Calculated (mg/dL)   Date Value   08/24/2021 61         Lab Results   Component Value Date    WBC 10.6 08/24/2021    NEUTROABS 7.0 08/24/2021    HGB 13.9 08/24/2021    HCT 44.0 08/24/2021    MCV 98.2 08/24/2021     08/24/2021       Lab Results   Component Value Date    TSH 1.57 09/13/2013       Patient's complete Health Risk Assessment and screening values have been reviewed and are found in Flowsheets. The following problems were reviewed today and where indicated follow up appointments were made and/or referrals ordered.     Positive Risk Factor Screenings with Interventions:         Substance History:  Social History     Tobacco History     Smoking Status  Current Every Day Smoker Smoking Frequency  0.5 packs/day for 55 years (27.5 pk yrs) Smoking Tobacco Type  Cigarettes    Smokeless Tobacco Use  Never Used          Alcohol History     Alcohol Use Status  No          Drug Use     Drug Use Status  No          Sexual Activity     Sexually Active  Not Asked               Alcohol Screening:       A score of 8 or more is associated with harmful or hazardous drinking. A score of 13 or more in women, and 15 or more in men, is likely to indicate alcohol dependence. Substance Abuse Interventions:  · none    General Health and ACP:  General  In general, how would you say your health is?: Fair  In the past 7 days, have you experienced any of the following?  New or Increased Pain, New or Increased Fatigue, Loneliness, Social Isolation, Stress or Anger?: None of These  Do you get the social and emotional support that you need?: Yes  Do you have a Living Will?: (!) No  Advance Directives     Power of [facility] Will ACP-Advance Directive ACP-Power of     Not on File Not on File Not on File Not on File      General Health Risk Interventions:  · none    Health Habits/Nutrition:  Health Habits/Nutrition  Do you exercise for at least 20 minutes 2-3 times per week?: (!) No  Have you lost any weight without trying in the past 3 months?: (!) Yes  Do you eat only one meal per day?: No  Have you seen the dentist within the past year?: N/A - wear dentures  Body mass index: (!) 28.18  Health Habits/Nutrition Interventions:  · none    Hearing/Vision:  No exam data present  Hearing/Vision  Do you or your family notice any trouble with your hearing that hasn't been managed with hearing aids?: (!) Yes  Do you have difficulty driving, watching TV, or doing any of your daily activities because of your eyesight?: No  Have you had an eye exam within the past year?: Yes  Hearing/Vision Interventions:  · none    Safety:  Safety  Do you have working smoke detectors?: (!) No  Have all throw rugs been removed or fastened?: Yes  Do you have non-slip mats or surfaces in all bathtubs/showers?: Yes  Do all of your stairways have a railing or banister?: Yes  Are your doorways, halls and stairs free of clutter?: Yes  Do you always fasten your seatbelt when you are in a car?: Yes  Safety Interventions:  · Home safety tips provided     Personalized Preventive Plan   Current Health Maintenance Status  Immunization History   Administered Date(s) Administered    Influenza Virus Vaccine 10/05/2019    Pneumococcal Conjugate 13-valent ([id number]) 01/04/2017    Pneumococcal Polysaccharide ([id number]) 02/19/2020        Health Maintenance   Topic Date Due    COVID-19 Vaccine (1) Never done    DTaP/Tdap/Td vaccine (1 - Tdap) Never done    Shingles Vaccine (1 of 2) Never done    Low dose CT lung screening  Never done    Colon Cancer Screen FIT/FOBT  12/28/2018    Annual Wellness Visit (AWV)  Never done    Flu vaccine (1) 09/01/2021    Potassium monitoring  08/24/2022    Creatinine monitoring  08/24/2022    Lipid screen  08/24/2026    Pneumococcal 65+ years Vaccine  Completed    AAA screen  Completed    Hepatitis C screen  Completed    Hepatitis A vaccine  Aged Out    Hepatitis B vaccine  Aged Out    Hib vaccine  Aged Out    Meningococcal (ACWY) vaccine  Aged Out     Recommendations for [employer] Due: see orders and patient instructions/AVS.    1. Medicare annual wellness visit, subsequent    2. Routine general medical examination at a health care facility    3. Encounter for screening for malignant neoplasm of prostate    4. Aortic dilatation (HCC)    5. Hyperlipidemia, unspecified hyperlipidemia type         No orders of the defined types were placed in this encounter.     Orders Placed This Encounter   Procedures    US ABDOMINAL AORTA LIMITED    Psa screening    COMPREHENSIVE METABOLIC PANEL    LIPID PANEL    CBC WITH AUTO DIFFERENTIAL     Patient Instructions [facility] - 8/24/2021  Medicare offers a range of preventive health benefits. Some of the tests and screenings are paid in full while other may be subject to a deductible, co-insurance, and/or copay. Some of these benefits include a comprehensive review of your medical history including lifestyle, illnesses that may run in your family, and various assessments and screenings as appropriate. After reviewing your medical record and screening and assessments performed today your provider may have ordered immunizations, labs, imaging, and/or referrals for you. A list of these orders (if applicable) as well as your Preventive Care list are included within your After Visit Summary for your review. Other Preventive Recommendations:    · A preventive eye exam performed by an eye specialist is recommended every 1-2 years to screen for glaucoma; cataracts, macular degeneration, and other eye disorders. · A preventive dental visit is recommended every 6 months. · Try to get at least 150 minutes of exercise per week or 10,000 steps per day on a pedometer . · Order or download the FREE \"Exercise & Physical Activity: Your Everyday Guide\" from [employer]. Call 3-124.925.6267 or search [employer] online. · You need 0174-8480 mg of calcium and 0736-6341 IU of vitamin D per day. It is possible to meet your calcium requirement with diet alone, but a vitamin D supplement is usually necessary to meet this goal.  · When exposed to the sun, use a sunscreen that protects against both UVA and UVB radiation with an SPF of 30 or greater. Reapply every 2 to 3 hours or after sweating, drying off with a towel, or swimming. · Always wear a seat belt when traveling in a car. Always wear a helmet when riding a bicycle or motorcycle. Return in 3 months (on 11/24/2021) for Medicare Annual Wellness Visit in 1 year.

## 2021-08-24 ENCOUNTER — HOSPITAL ENCOUNTER (OUTPATIENT)
Facility: HOSPITAL | Age: 76
Discharge: HOME | End: 2021-08-24
Payer: MEDICARE

## 2021-08-24 ENCOUNTER — OFFICE VISIT (OUTPATIENT)
Dept: PRIMARY CARE CLINIC | Age: 76
End: 2021-08-24
Payer: MEDICARE

## 2021-08-24 VITALS
DIASTOLIC BLOOD PRESSURE: 80 MMHG | SYSTOLIC BLOOD PRESSURE: 138 MMHG | BODY MASS INDEX: 28.15 KG/M2 | HEART RATE: 61 BPM | OXYGEN SATURATION: 98 % | TEMPERATURE: 97.5 F | WEIGHT: 207.8 LBS | HEIGHT: 72 IN

## 2021-08-24 DIAGNOSIS — I77.819 AORTIC DILATATION (HCC): ICD-10-CM

## 2021-08-24 DIAGNOSIS — Z12.5 ENCOUNTER FOR SCREENING FOR MALIGNANT NEOPLASM OF PROSTATE: ICD-10-CM

## 2021-08-24 DIAGNOSIS — Z00.00 ROUTINE GENERAL MEDICAL EXAMINATION AT A HEALTH CARE FACILITY: ICD-10-CM

## 2021-08-24 DIAGNOSIS — E78.5 HYPERLIPIDEMIA, UNSPECIFIED HYPERLIPIDEMIA TYPE: ICD-10-CM

## 2021-08-24 DIAGNOSIS — Z00.00 MEDICARE ANNUAL WELLNESS VISIT, SUBSEQUENT: Primary | ICD-10-CM

## 2021-08-24 DIAGNOSIS — Z00.00 MEDICARE ANNUAL WELLNESS VISIT, SUBSEQUENT: ICD-10-CM

## 2021-08-24 LAB
A/G RATIO: 1.4 (ref 0.8–2)
ALBUMIN SERPL-MCNC: 3.9 G/DL (ref 3.4–4.8)
ALP BLD-CCNC: 90 U/L (ref 25–100)
ALT SERPL-CCNC: 11 U/L (ref 4–36)
ANION GAP SERPL CALCULATED.3IONS-SCNC: 8 MMOL/L (ref 3–16)
AST SERPL-CCNC: 15 U/L (ref 8–33)
BASOPHILS ABSOLUTE: 0.1 K/UL (ref 0–0.1)
BASOPHILS RELATIVE PERCENT: 0.6 %
BILIRUB SERPL-MCNC: 0.3 MG/DL (ref 0.3–1.2)
BUN BLDV-MCNC: 21 MG/DL (ref 6–20)
CALCIUM SERPL-MCNC: 9.5 MG/DL (ref 8.5–10.5)
CHLORIDE BLD-SCNC: 106 MMOL/L (ref 98–107)
CHOLESTEROL, TOTAL: 124 MG/DL (ref 0–200)
CO2: 25 MMOL/L (ref 20–30)
CREAT SERPL-MCNC: 1.4 MG/DL (ref 0.4–1.2)
EOSINOPHILS ABSOLUTE: 0.3 K/UL (ref 0–0.4)
EOSINOPHILS RELATIVE PERCENT: 2.8 %
GFR AFRICAN AMERICAN: >59
GFR NON-AFRICAN AMERICAN: 50
GLOBULIN: 2.7 G/DL
GLUCOSE BLD-MCNC: 82 MG/DL (ref 74–106)
HCT VFR BLD CALC: 44 % (ref 40–54)
HDLC SERPL-MCNC: 44 MG/DL (ref 40–60)
HEMOGLOBIN: 13.9 G/DL (ref 13–18)
IMMATURE GRANULOCYTES #: 0 K/UL
IMMATURE GRANULOCYTES %: 0.2 % (ref 0–5)
LDL CHOLESTEROL CALCULATED: 61 MG/DL
LYMPHOCYTES ABSOLUTE: 2.2 K/UL (ref 1.5–4)
LYMPHOCYTES RELATIVE PERCENT: 20.9 %
MCH RBC QN AUTO: 31 PG (ref 27–32)
MCHC RBC AUTO-ENTMCNC: 31.6 G/DL (ref 31–35)
MCV RBC AUTO: 98.2 FL (ref 80–100)
MONOCYTES ABSOLUTE: 1 K/UL (ref 0.2–0.8)
MONOCYTES RELATIVE PERCENT: 9.2 %
NEUTROPHILS ABSOLUTE: 7 K/UL (ref 2–7.5)
NEUTROPHILS RELATIVE PERCENT: 66.3 %
PDW BLD-RTO: 12.9 % (ref 11–16)
PLATELET # BLD: 233 K/UL (ref 150–400)
PMV BLD AUTO: 11 FL (ref 6–10)
POTASSIUM SERPL-SCNC: 4.5 MMOL/L (ref 3.4–5.1)
PROSTATE SPECIFIC ANTIGEN: 0.95 NG/ML (ref 0–4)
RBC # BLD: 4.48 M/UL (ref 4.5–6)
SODIUM BLD-SCNC: 139 MMOL/L (ref 136–145)
TOTAL PROTEIN: 6.6 G/DL (ref 6.4–8.3)
TRIGL SERPL-MCNC: 94 MG/DL (ref 0–249)
VLDLC SERPL CALC-MCNC: 19 MG/DL
WBC # BLD: 10.6 K/UL (ref 4–11)

## 2021-08-24 PROCEDURE — G0439 PPPS, SUBSEQ VISIT: HCPCS | Performed by: NURSE PRACTITIONER

## 2021-08-24 PROCEDURE — 3017F COLORECTAL CA SCREEN DOC REV: CPT | Performed by: NURSE PRACTITIONER

## 2021-08-24 PROCEDURE — 85025 COMPLETE CBC W/AUTO DIFF WBC: CPT

## 2021-08-24 PROCEDURE — 80053 COMPREHEN METABOLIC PANEL: CPT

## 2021-08-24 PROCEDURE — 1123F ACP DISCUSS/DSCN MKR DOCD: CPT | Performed by: NURSE PRACTITIONER

## 2021-08-24 PROCEDURE — 84153 ASSAY OF PSA TOTAL: CPT

## 2021-08-24 PROCEDURE — 4040F PNEUMOC VAC/ADMIN/RCVD: CPT | Performed by: NURSE PRACTITIONER

## 2021-08-24 PROCEDURE — 80061 LIPID PANEL: CPT

## 2021-08-24 ASSESSMENT — PATIENT HEALTH QUESTIONNAIRE - PHQ9
SUM OF ALL RESPONSES TO PHQ QUESTIONS 1-9: 0
SUM OF ALL RESPONSES TO PHQ9 QUESTIONS 1 & 2: 0
2. FEELING DOWN, DEPRESSED OR HOPELESS: 0
1. LITTLE INTEREST OR PLEASURE IN DOING THINGS: 0
SUM OF ALL RESPONSES TO PHQ QUESTIONS 1-9: 0
SUM OF ALL RESPONSES TO PHQ QUESTIONS 1-9: 0

## 2021-08-24 NOTE — PATIENT INSTRUCTIONS
Personalized Preventive Plan for Roberto Berhane - 8/24/2021  Medicare offers a range of preventive health benefits. Some of the tests and screenings are paid in full while other may be subject to a deductible, co-insurance, and/or copay. Some of these benefits include a comprehensive review of your medical history including lifestyle, illnesses that may run in your family, and various assessments and screenings as appropriate. After reviewing your medical record and screening and assessments performed today your provider may have ordered immunizations, labs, imaging, and/or referrals for you. A list of these orders (if applicable) as well as your Preventive Care list are included within your After Visit Summary for your review. Other Preventive Recommendations:    · A preventive eye exam performed by an eye specialist is recommended every 1-2 years to screen for glaucoma; cataracts, macular degeneration, and other eye disorders. · A preventive dental visit is recommended every 6 months. · Try to get at least 150 minutes of exercise per week or 10,000 steps per day on a pedometer . · Order or download the FREE \"Exercise & Physical Activity: Your Everyday Guide\" from [employer]. Call 0-581.362.3525 or search [employer] online. · You need 7893-4077 mg of calcium and 0980-8239 IU of vitamin D per day. It is possible to meet your calcium requirement with diet alone, but a vitamin D supplement is usually necessary to meet this goal.  · When exposed to the sun, use a sunscreen that protects against both UVA and UVB radiation with an SPF of 30 or greater. Reapply every 2 to 3 hours or after sweating, drying off with a towel, or swimming. · Always wear a seat belt when traveling in a car. Always wear a helmet when riding a bicycle or motorcycle.

## 2021-08-30 ENCOUNTER — HOSPITAL ENCOUNTER (OUTPATIENT)
Dept: ULTRASOUND IMAGING | Facility: HOSPITAL | Age: 76
Discharge: HOME | End: 2021-08-30
Payer: MEDICARE

## 2021-08-30 DIAGNOSIS — I77.819 AORTIC DILATATION (HCC): ICD-10-CM

## 2021-08-30 PROCEDURE — 76775 US EXAM ABDO BACK WALL LIM: CPT

## 2021-08-31 DIAGNOSIS — I71.40 ABDOMINAL AORTIC ANEURYSM (AAA) WITHOUT RUPTURE: Primary | ICD-10-CM

## 2021-09-25 ASSESSMENT — ENCOUNTER SYMPTOMS
SORE THROAT: 0
COUGH: 0
SHORTNESS OF BREATH: 0
NAUSEA: 0
VOMITING: 0
ABDOMINAL PAIN: 0
EYE PAIN: 0

## 2021-10-01 DIAGNOSIS — I71.40 AAA (ABDOMINAL AORTIC ANEURYSM) WITHOUT RUPTURE (HCC): Primary | ICD-10-CM

## 2021-10-06 ENCOUNTER — TELEPHONE (OUTPATIENT)
Dept: CARDIOLOGY | Facility: CLINIC | Age: 76
End: 2021-10-06

## 2021-10-22 RX ORDER — AMLODIPINE BESYLATE 5 MG/1
TABLET ORAL
Qty: 90 TABLET | Refills: 3 | Status: SHIPPED | OUTPATIENT
Start: 2021-10-22

## 2021-11-01 ENCOUNTER — OFFICE VISIT (OUTPATIENT)
Dept: CARDIAC SURGERY | Facility: CLINIC | Age: 76
End: 2021-11-01

## 2021-11-01 VITALS
BODY MASS INDEX: 28.15 KG/M2 | HEART RATE: 65 BPM | WEIGHT: 207.8 LBS | TEMPERATURE: 98.6 F | HEIGHT: 72 IN | OXYGEN SATURATION: 98 % | DIASTOLIC BLOOD PRESSURE: 88 MMHG | SYSTOLIC BLOOD PRESSURE: 159 MMHG

## 2021-11-01 DIAGNOSIS — I71.40 ABDOMINAL AORTIC ANEURYSM (AAA) WITHOUT RUPTURE (HCC): Primary | ICD-10-CM

## 2021-11-01 PROCEDURE — 99204 OFFICE O/P NEW MOD 45 MIN: CPT | Performed by: THORACIC SURGERY (CARDIOTHORACIC VASCULAR SURGERY)

## 2021-11-01 NOTE — PROGRESS NOTES
11/01/2021  Patient Information  Thor Ca                                                                                          [address] [place] [address]   1945  'PCP/Referring Physician'  Sahra Toro APRN  [phone]  Bill Ward MD  [phone]  Chief Complaint   Patient presents with   • Aortic Aneurysm     Referred by Dr. Ward for abdominal aortic aneurysm        History of Present Illness:   The patient is a 75-year-old gentleman that I have been asked to see who has an infrarenal abdominal aortic aneurysm.  He has had this for some period of time and has been followed.  It has increased slightly in size but now only measures 3.9 cm in the mid infrarenal abdominal aorta.  He has no unusual back and/or flank pain. He does have arthritis that sometimes bothers his hips and makes him slightly difficult to assess on a clinical basis.  He has known coronary disease and is followed by his cardiologist.  He has some baseline renal insufficiency however, his most recent serum creatinine is essentially normal.  Although he is classified with stage III renal insufficiency.  He is a former smoker but discontinued smoking approximately 2 years ago.    Patient Active Problem List   Diagnosis   • Coronary artery disease involving native coronary artery of native heart without angina pectoris   • Essential hypertension   • Mixed hyperlipidemia   • Bilateral carotid artery stenosis   • Peripheral vascular disease with claudication (HCC)   • CKD (chronic kidney disease) stage 3, GFR 30-59 ml/min (HCC)   • Abdominal aortic aneurysm (AAA) without rupture (HCC)     Past Medical History:   Diagnosis Date   • Acid reflux    • Arthritis    • Coronary artery disease    • Depression    • Heart murmur    • Hyperlipidemia    • Hypertension    • Myocardial infarction (HCC)    • Peripheral vascular disease (HCC)      Past Surgical History:   Procedure Laterality Date   • CARDIAC CATHETERIZATION N/A  2016    Procedure: Left Heart Cath;  Surgeon: Bill Ward MD;  Location:  [facility];  Service:    • CARDIAC CATHETERIZATION N/A 10/17/2019    Procedure: Peripheral angiography;  Surgeon: Bill Ward MD;  Location:  [facility];  Service: Cardiovascular   • CARDIAC CATHETERIZATION N/A 2020    Procedure: Peripheral angiography with imaging.;  Surgeon: Bill Ward MD;  Location:  [facility];  Service: Cardiovascular;  Laterality: N/A;   • HYDROCELE EXCISION / REPAIR     • KNEE ARTHROPLASTY, PARTIAL REPLACEMENT  2018   • NECK SURGERY     • SKIN BIOPSY     • SKIN CANCER EXCISION         Current Outpatient Medications:   •  Alirocumab 75 MG/ML solution auto-injector, Inject 75 mg under the skin into the appropriate area as directed Every 14 (Fourteen) Days., Disp: 2 pen, Rfl: 11  •  amLODIPine (NORVASC) 5 MG tablet, TAKE 1 TABLET BY MOUTH EVERY DAY, Disp: 90 tablet, Rfl: 3  •  aspirin 81 MG tablet, Take 1 tablet by mouth Daily., Disp: 30 tablet, Rfl: 11  •  carvedilol (COREG) 12.5 MG tablet, Take 1 tablet by mouth Every 12 (Twelve) Hours., Disp: 180 tablet, Rfl: 3  •  lisinopril (PRINIVIL,ZESTRIL) 10 MG tablet, TAKE 1 TABLET BY MOUTH DAILY, Disp: 90 tablet, Rfl: 1  •  sertraline (ZOLOFT) 50 MG tablet, Take 50 mg by mouth Daily., Disp: , Rfl:   •  rosuvastatin (CRESTOR) 40 MG tablet, Take 40 mg by mouth Every Night. (Patient not taking: Reported on 2021), Disp: , Rfl:   No Known Allergies  Social History     Socioeconomic History   • Marital status:    • Number of children: 3   Tobacco Use   • Smoking status: Former Smoker     Packs/day: 0.50     Years: 50.00     Pack years: 25.00     Types: Cigarettes     Quit date: 2019     Years since quittin.0   • Smokeless tobacco: Never Used   Vaping Use   • Vaping Use: Never used   Substance and Sexual Activity   • Alcohol use: No     Comment: 1 beer a month, very rare   • Drug use:  "No   • Sexual activity: Defer     Family History   Problem Relation Age of Onset   • Heart disease Mother    • Diabetes Mother      Review of Systems   Constitutional: Negative for chills, fever, malaise/fatigue, night sweats and weight loss.   HENT: Negative for hearing loss, odynophagia and sore throat.    Cardiovascular: Negative for chest pain, dyspnea on exertion, leg swelling, orthopnea and palpitations.   Respiratory: Negative for cough and hemoptysis.    Endocrine: Negative for cold intolerance, heat intolerance, polydipsia, polyphagia and polyuria.   Hematologic/Lymphatic: Bruises/bleeds easily.   Skin: Negative for itching and rash.   Musculoskeletal: Positive for back pain and joint pain. Negative for joint swelling and myalgias.   Gastrointestinal: Negative for abdominal pain, constipation, diarrhea, hematemesis, hematochezia, melena, nausea and vomiting.   Genitourinary: Negative for dysuria, frequency and hematuria.   Neurological: Negative for focal weakness, headaches, numbness and seizures.   Psychiatric/Behavioral: Negative for depression and suicidal ideas. The patient is not nervous/anxious.    All other systems reviewed and are negative.    Vitals:    11/01/21 0731   BP: 159/88   BP Location: Right arm   Patient Position: Sitting   Pulse: 65   Temp: 98.6 °F (37 °C)   SpO2: 98%   Weight: 94.3 kg (207 lb 12.8 oz)   Height: 182.9 cm (72\")      Physical Exam   CONSTITUTIONAL: Alert and conversant, Well dressed, Well nourished, No acute distress  EYES: Sclera clean, Anicteric, Pupils equal  ENT: No nasal deviation, Trachea midline  NECK: No neck masses, Supple  LUNGS: No wheezing, Cough, non-congested  HEART: No rubs, No murmurs  GASTROINTESTINAL: Soft, non-distended, No masses, Non tender  to palpation, normal bowel sounds  NEURO: No motor deficits, No sensory deficits, Cranial Nerves 2 through 12 grossly intact  PSYCHIATRIC: Oriented to person, place and time, No memory deficits, Mood " appropriate  VASCULAR: No carotid bruits, Femoral pulses palpable and symmetric  MUSKULOSKELETAL: No extremity trauma or extremity asymmetry    The ROS, past medical history, surgical history, family history, social history and vitals were reviewed by myself and corrected as needed.      Labs/Imaging:  I reviewed the ultrasound report demonstrating a 3.9 cm infrarenal aortic aneurysm and I have reviewed his laboratory studies indicating his serum creatinine is adequate for a CT scan.    Assessment/Plan:   The patient is a 75-year-old male who is being referred for a slightly enlarging infrarenal aortic aneurysm but this only measures 3.9 cm by ultrasound.  As best I can determine, he has not had a CT scan of his abdominal aorta which may be more accurate for sizing or planning of any future surgical intervention.  At this time, the patient does not require surgery and would not require surgery unless this aneurysm approaches 5 cm or become symptomatic.  However, I would like to obtain a CT angiogram in 6 months so that we can have a baseline accurate measurement and study of his femoral arteries on that scan.  If his creatinine were to worsen in the ensuing years we would be unable to obtain an adequate CT scan at that time and it may be very valuable to have one to refer back to for surgical planning if that becomes necessary.  I will obtain a CT angiogram of the abdominal aorta in 6 months at [facility] which is close to his house. He will follow back up in our office for evaluation and we could potentially follow him with ultrasounds at that point.    Patient Active Problem List   Diagnosis   • Coronary artery disease involving native coronary artery of native heart without angina pectoris   • Essential hypertension   • Mixed hyperlipidemia   • Bilateral carotid artery stenosis   • Peripheral vascular disease with claudication ([facility])   • CKD (chronic kidney disease) stage 3, GFR 30-59 ml/min ([facility])    • Abdominal aortic aneurysm (AAA) without rupture (HCC)         CC: ALEXANDRA Salgado MD Regina Fugate editing for Jere Encarnacion MD      I, Jere Encarnacion MD, have read and agree with the editing done by Yoli Andujar, .

## 2022-01-20 RX ORDER — LISINOPRIL 10 MG/1
10 TABLET ORAL DAILY
Qty: 90 TABLET | Refills: 1 | Status: SHIPPED | OUTPATIENT
Start: 2022-01-20

## 2022-01-20 RX ORDER — CARVEDILOL 12.5 MG/1
TABLET ORAL
Qty: 180 TABLET | Refills: 3 | Status: SHIPPED | OUTPATIENT
Start: 2022-01-20

## 2022-04-20 DIAGNOSIS — I71.40 ABDOMINAL AORTIC ANEURYSM (AAA) WITHOUT RUPTURE: Primary | ICD-10-CM

## 2022-04-20 DIAGNOSIS — I70.213 ATHEROSCLEROSIS OF NATIVE ARTERY OF BOTH LOWER EXTREMITIES WITH INTERMITTENT CLAUDICATION: ICD-10-CM

## 2022-05-09 ENCOUNTER — APPOINTMENT (OUTPATIENT)
Dept: CT IMAGING | Facility: HOSPITAL | Age: 77
End: 2022-05-09

## 2022-12-02 ENCOUNTER — HOSPITAL ENCOUNTER (OUTPATIENT)
Facility: HOSPITAL | Age: 77
Discharge: HOME | End: 2022-12-02
Payer: MEDICARE

## 2022-12-02 ENCOUNTER — OFFICE VISIT (OUTPATIENT)
Dept: CARDIOLOGY | Facility: CLINIC | Age: 77
End: 2022-12-02

## 2022-12-02 VITALS
DIASTOLIC BLOOD PRESSURE: 90 MMHG | OXYGEN SATURATION: 99 % | BODY MASS INDEX: 27.58 KG/M2 | HEIGHT: 71 IN | SYSTOLIC BLOOD PRESSURE: 146 MMHG | HEART RATE: 83 BPM | WEIGHT: 197 LBS

## 2022-12-02 DIAGNOSIS — E78.2 MIXED HYPERLIPIDEMIA: ICD-10-CM

## 2022-12-02 DIAGNOSIS — I35.9 AORTIC VALVE CALCIFICATION: ICD-10-CM

## 2022-12-02 DIAGNOSIS — I25.10 CORONARY ARTERY DISEASE INVOLVING NATIVE CORONARY ARTERY OF NATIVE HEART WITHOUT ANGINA PECTORIS: Primary | ICD-10-CM

## 2022-12-02 DIAGNOSIS — I73.9 PERIPHERAL VASCULAR DISEASE WITH CLAUDICATION: ICD-10-CM

## 2022-12-02 DIAGNOSIS — R01.1 CARDIAC MURMUR: ICD-10-CM

## 2022-12-02 DIAGNOSIS — I65.23 BILATERAL CAROTID ARTERY STENOSIS: ICD-10-CM

## 2022-12-02 PROCEDURE — 93005 ELECTROCARDIOGRAM TRACING: CPT

## 2022-12-02 PROCEDURE — 99214 OFFICE O/P EST MOD 30 MIN: CPT | Performed by: INTERNAL MEDICINE

## 2022-12-02 PROCEDURE — 93000 ELECTROCARDIOGRAM COMPLETE: CPT | Performed by: INTERNAL MEDICINE

## 2022-12-02 RX ORDER — ROSUVASTATIN CALCIUM 40 MG/1
40 TABLET, COATED ORAL NIGHTLY
Qty: 90 TABLET | Refills: 3 | Status: SHIPPED | OUTPATIENT
Start: 2022-12-02

## 2022-12-02 NOTE — PROGRESS NOTES
[facility] AT [address], [address]  [place], [place], [address] [phone]  WWW.[email or web address]           OUTPATIENT CLINIC FOLLOW UP NOTE    Patient Care Team:  Patient Care Team:  Sahra Toro APRN as PCP - General (Family Medicine)  Bill Ward MD as Consulting Physician (Cardiology)    Subjective:   Reason for consultation:   Chief Complaint   Patient presents with   • Coronary Artery Disease   • Shortness of Breath       HPI:    Thor Ca is a 77 y.o. male.  Problem list:  1. CAD   1. NSTEMI 9/2016 with intermediate LAD disease and no intervention.  Possible spontaneous revascularization  2. PAD  1. Chronic total occlusion of the left SFA unsuccessful attempt at antegrade crossing 10/2019.  2. Status post retrograde crossing, directional atherectomy and drug-coated balloon angioplasty to the left SFA in 1/2020  3. Aortic valve calcification  4. Carotid stenosis  5. Tobacco dependence quit with Chantix in 2019  6. Spinal stenosis back injection    He presents today for follow-up.    Patient has had progressive worsening left lower extremity discomfort.  Had a back injection which helped his left thigh discomfort, but this did not help his below the knee discomfort.  His below the knee discomfort has gotten worse since his last visit with us a year and a half ago.    Denies chest pain, unusual dyspnea, lower extremity swelling, stroke symptoms    Review of systems  As noted in the HPI    PFSH:  Patient Active Problem List   Diagnosis   • Coronary artery disease involving native coronary artery of native heart without angina pectoris   • Essential hypertension   • Mixed hyperlipidemia   • Bilateral carotid artery stenosis   • Peripheral vascular disease with claudication (HCC)   • CKD (chronic kidney disease) stage 3, GFR 30-59 ml/min ([facility])   • Abdominal aortic aneurysm (AAA) without rupture         Current Outpatient Medications:   •  amLODIPine (NORVASC)  "5 MG tablet, TAKE 1 TABLET BY MOUTH EVERY DAY, Disp: 90 tablet, Rfl: 3  •  aspirin 81 MG tablet, Take 1 tablet by mouth Daily., Disp: 30 tablet, Rfl: 11  •  carvedilol (COREG) 12.5 MG tablet, TAKE 1 TABLET BY MOUTH EVERY 12 HOURS, Disp: 180 tablet, Rfl: 3  •  lisinopril (PRINIVIL,ZESTRIL) 10 MG tablet, TAKE 1 TABLET BY MOUTH DAILY, Disp: 90 tablet, Rfl: 1  •  rosuvastatin (CRESTOR) 40 MG tablet, Take 1 tablet by mouth Every Night., Disp: 90 tablet, Rfl: 3  •  sertraline (ZOLOFT) 50 MG tablet, Take 50 mg by mouth Daily., Disp: , Rfl:     No Known Allergies     reports that he quit smoking about 3 years ago. His smoking use included cigarettes. He has a 25.00 pack-year smoking history. He has never used smokeless tobacco.    Family History   Problem Relation Age of Onset   • Heart disease Mother    • Diabetes Mother          Objective:   Blood pressure 146/90, pulse 83, height 180.3 cm (71\"), weight 89.4 kg (197 lb), SpO2 99 %.  CONSTITUTIONAL: No acute distress  RESPIRATORY: Normal effort. Clear to auscultation bilaterally without wheezing or rales  CARDIOVASCULAR: Regular rate and rhythm with normal S1 and S2. Without gallop or rub.  Systolic murmur at the sternal border.  Bilateral carotid bruits, right greater than left  PERIPHERAL VASCULAR: Normal radial pulse.     9/2019 exam, prior to intervention: 2+ right DP pulse, 0+ left DP pulse.    11/2021 exam: Right dorsalis pedis pulse 1+, faint left dorsalis pedis pulse.  12/2022 exam: Right DP 1+, possible faint left DP.  Tips of toes with a bluish hue    Labs:  Lab Results   Component Value Date    ALT 9 05/21/2021    AST 15 05/21/2021     Lab Results   Component Value Date    CHOL 213 (H) 01/31/2020    TRIG 94 08/24/2021    HDL 44 08/24/2021    CREATININE 1.21 02/01/2020       Diagnostic Data:      ECG 12 Lead    Date/Time: 12/2/2022 3:51 PM  Performed by: Bill Ward MD  Authorized by: Bill Ward MD   Comparison: compared with previous ECG from " 5/21/2021  Comparison to previous ECG: First-degree AV block, unchanged, PVCs now present  Rhythm: sinus rhythm            Coronary Angiogram 9/2016  Mild to moderate 2 vessel CAD involving the LAD and Ramus, no culprit lesion or functionally significant stenosis was found for intervention. Borderline 50% mid LAD stenosis with 0.91 FFR. The patient possibly had a ruptured plaque that has since revascularized.    ABIs 10/2019  Ankle brachial index preexercise - Right DAYSI  0.86, Left DAYSI 0.80  Ankle brachial index post exercise- Right DAYSI  0.86, Left DAYSI 0.73    Peripheral Angiogram 1/2020  · There was a 100% thrombotic calcified chronic total occlusion of the left SFA that is now status post dissection reentry retrograde crossing, directional atherectomy, drug-coated balloon angioplasty.    Carotid Duplex US 9/2016  · Proximal right internal carotid artery stenosis of 50-69%.  · Proximal left internal carotid artery stenosis of 0-49%.    Carotid duplex 10/2019  · Proximal right internal carotid artery stenosis of 50-69%.  · Proximal left internal carotid artery stenosis of 0-49%.  · Antegrade flow in the vertebral arteries bilaterally    TTE 9/2019  -EF 60 to 65%, grade 1 diastolic dysfunction, mild to moderate LVH, severe aortic calcification without stenosis or regurgitation, mildly dilated aorta root 4.1    Results for orders placed in visit on 09/24/19    SCANNED - ECHOCARDIOGRAM        Assessment and Plan:     Coronary artery disease involving native coronary artery of native heart without angina pectoris  Mixed hyperlipidemia  -CCS 0, NSTEMI 9/2016, negative iFR of intermediate LAD disease, no PCI, EF normal  -Myalgias with atorvastatin.  -Continue aspirin, amlodipine, carvedilol    -Has been off of rosuvastatin and Praluent for unclear reasons  -Restart rosuvastatin  -Repeat lipid panel and LFTs in 8 to 12 weeks  -If not at goal, will try to put him back on Praluent, which she is able to obtain in the  past     PAD  -Left SFA  status post intervention 1/2020  -Worsening symptoms, left-sided  -Repeat exercise ABIs  -Discussed the possibility of repeat peripheral angiogram and intervention  -Continue aspirin  -Statin as noted above    Aortic calcification  -Without significant regurg and stenosis as of 10/2019  -Repeat echocardiogram     Bruit of right carotid artery  -Moderate disease as of 10/2019  -Continue aspirin, statin, Repatha.  -Repeat carotid duplex ultrasound      Essential hypertension  -Continue current related medications    -Return in about 1 year (around 12/2/2023) for Next scheduled follow-up with an ECG.    Bill Ward MD, MSc, FACC, [facility]  Interventional Cardiology  [facility]

## 2022-12-07 ENCOUNTER — HOSPITAL ENCOUNTER (OUTPATIENT)
Dept: NON INVASIVE DIAGNOSTICS | Facility: HOSPITAL | Age: 77
Discharge: HOME | End: 2022-12-07
Payer: MEDICARE

## 2022-12-07 ENCOUNTER — HOSPITAL ENCOUNTER (OUTPATIENT)
Dept: ULTRASOUND IMAGING | Facility: HOSPITAL | Age: 77
Discharge: HOME | End: 2022-12-07
Payer: MEDICARE

## 2022-12-07 DIAGNOSIS — I65.23 BILATERAL CAROTID ARTERY STENOSIS: ICD-10-CM

## 2022-12-07 DIAGNOSIS — I73.9 CLAUDICATION (HCC): ICD-10-CM

## 2022-12-07 DIAGNOSIS — I73.9 PERIPHERAL VASCULAR DISEASE, UNSPECIFIED (HCC): ICD-10-CM

## 2022-12-07 PROCEDURE — 93306 TTE W/DOPPLER COMPLETE: CPT

## 2022-12-07 PROCEDURE — 93880 EXTRACRANIAL BILAT STUDY: CPT

## 2022-12-07 PROCEDURE — 93925 LOWER EXTREMITY STUDY: CPT

## 2023-01-03 ENCOUNTER — TELEPHONE (OUTPATIENT)
Dept: CARDIOLOGY | Facility: CLINIC | Age: 78
End: 2023-01-03
Payer: MEDICARE

## 2023-01-03 DIAGNOSIS — I25.10 CORONARY ARTERY DISEASE INVOLVING NATIVE CORONARY ARTERY OF NATIVE HEART WITHOUT ANGINA PECTORIS: ICD-10-CM

## 2023-01-03 DIAGNOSIS — I73.9 PERIPHERAL VASCULAR DISEASE WITH CLAUDICATION: Primary | ICD-10-CM

## 2023-01-03 RX ORDER — CILOSTAZOL 50 MG/1
50 TABLET ORAL 2 TIMES DAILY
Qty: 180 TABLET | Refills: 3 | Status: SHIPPED | OUTPATIENT
Start: 2023-01-03

## 2023-01-03 NOTE — TELEPHONE ENCOUNTER
Patient contacted to review recent echo results. Patient verbalizes understanding, all questions answered at this time.         Carotid ultrasound and DAYSI scanned under media on 12/8/22.

## 2023-01-03 NOTE — TELEPHONE ENCOUNTER
----- Message from Bill Ward MD sent at 1/3/2023  1:24 PM EST -----  Please let the patient know his echo looks okay.  Has calcification over his aortic valve, but it is functioning okay.  Please ask if he has had his carotid and exercise ABIs.  Thanks.     Office Note      3/23/2019    Chief Complaint:  Chief Complaint   Patient presents with   • Follow-up       HPI:    Richard presents today with chronic conditions   Chose to keep her original appointment     C/o similar sx which took her to the hospital last time  Felt nauseated, palpitations, sweaty, lightheaded lasted for couple hours 3 days  Treated self by relaxing, deep breaths, sitting still ... That helped to slow the heart down   No recurrent sx since  No recall of specific trigger  Denies diarrhea and emesis   [facility] not here still   Something got loud, denies ringing in the ears    Noted hematology consult       Review of Systems   Constitutional: HPI  HENT: HPI  Respiratory: Negative for cough, chest tightness, wheezing and stridor.    Cardiovascular: HPI  Gastrointestinal:. HPI  Neurological: Negative seizures, syncope, facial asymmetry, speech difficulty and weakness.   Hematological: HPI  Psychiatric/Behavioral: Negative for sleep disturbance. Happy with life   Not stressed      Current Medications:   Current Outpatient Medications   Medication Sig Dispense Refill   • ADVAIR DISKUS 250-50 MCG/DOSE inhaler INHALE 2 PUFFS BY MOUTH EVERY DAY 60 each 0   • dilTIAZem (CARTIA XT) 180 MG 24 hr capsule TAKE ONE CAPSULE BY MOUTH EVERY DAY     • furosemide (LASIX) 20 MG tablet TAKE 1-2 TABLETS BY MOUTH EVERY DAY     • terbinafine (LAMISIL AT) 1 % cream Also known as \"terbinafine\"  APPLY 2-3 TIMES DAILY TO Dermatitis.  Over the counter.  No prescription needed.     • montelukast (SINGULAIR) 10 MG tablet take one tablet by mouth every day     • potassium chloride (KLOR-CON M) 10 MEQ rashad ER tablet take one tablet by mouth every day     • omeprazole (PRILOSEC) 40 MG capsule Take 1 capsule by mouth daily. PRN 90 capsule 1     No current facility-administered medications for this visit.        Relevant Past Medical History:  History reviewed. No pertinent past medical history.    Social History      Socioeconomic History   • Marital status:      Spouse name: Not on file   • Number of children: Not on file   • Years of education: Not on file   • Highest education level: Not on file   Social Needs   • Financial resource strain: Not on file   • Food insecurity - worry: Not on file   • Food insecurity - inability: Not on file   • Transportation needs - medical: Not on file   • Transportation needs - non-medical: Not on file   Occupational History   • Not on file   Tobacco Use   • Smoking status: Never Smoker   • Smokeless tobacco: Never Used   Substance and Sexual Activity   • Alcohol use: No     Frequency: Never   • Drug use: No   • Sexual activity: No   Other Topics Concern   • Not on file   Social History Narrative   • Not on file       Past Surgical History:   Procedure Laterality Date   • Hysterectomy         ALLERGIES:   Allergen Reactions   • Ace Inhibitors Other (See Comments)     Angioedema   • Lime   (Food) Other (See Comments)     unknown   • Shellfish Allergy   (Food Or Med) Other (See Comments)     unknown   • Sulfa Antibiotics Other (See Comments)     unknown        Family History   Problem Relation Age of Onset   • Cancer Mother    • Cancer Sister        Examination:   Visit Vitals  BP (!) 139/96 [place] [facility] [place])   Pulse 64   Temp 97.8 °F (36.6 °C) (Oral)   Resp 18   Ht 5' 4\" (1.626 m)   Wt 108.4 kg (239 lb)   SpO2 100%   BMI 41.02 kg/m²       Weight    03/23/19 0904   Weight: 108.4 kg (239 lb)       Physical Exam   Constitutional: Oriented to person, place, and time. Appears well-developed and well-nourished. No distress.   Head: Normocephalic and atraumatic.   Right Ear: External ear normal.   Left Ear: External ear normal.   Nose: left medial wall blood visible   Mouth/Throat: Oropharynx is clear and moist.   Cardiovascular: Normal rate, regular rhythm, normal heart sounds  Pulmonary/Chest: Effort normal and breath sounds normal. No respiratory distress.No  wheezes.  Abdominal: Soft. Bowel sounds are normal.No distension. There is no tenderness. There is no rebound and no guarding.   Psychiatric: Behavior is normal. Judgment normal.   Nursing note and vitals reviewed.      Assessment/Plan:  Problem List Items Addressed This Visit        Respiratory    Mild intermittent asthma without complication     Remains asymptomatic and controlled           Epistaxis, recurrent     Use vaseline coat at the entrance of nostril to keep nasal mucosa moist  Can use saline spray as needed also  NO digging in nose  When bleeding apply pressure just below the nasal cartilage and hold pressure for 5 mins  Use ice packs on fore head and neck            Circulatory    Essential hypertension     Hypertension is .  Continue current treatment regimen.  Dietary sodium restriction.  Regular aerobic exercise.  Continue current medications.           Palpitations - Primary     AVOID caffeine  Cardiology consult   Encourage monitor heart rate and rhythm            Digestive    Morbid obesity with BMI of 40.0-44.9, adult (CMS/[facility])      at length to loose weight   Agrees to work on diet (change kinds of foods, drink water n let go of juices and pop)  exercise 4 to 5 times a week  Stress management and self help           Nausea     Encourage take BASA WITH food at ALL times   PPI as needed          Relevant Medications    omeprazole (PRILOSEC) 40 MG capsule       Oncologic    Indolent multiple myeloma (CMS/[facility])     Noted hematology input                  Return in about 2 months (around 5/23/2019).        Mandakini K Pokharna, MD  3/23/2019

## 2023-01-03 NOTE — TELEPHONE ENCOUNTER
Patient contacted with recommendations. Patient states he is still symptomatic. He is agreeable to plan, all questions answered at this time.

## 2023-01-05 ENCOUNTER — LAB (OUTPATIENT)
Dept: LAB | Facility: HOSPITAL | Age: 78
End: 2023-01-05
Payer: MEDICARE

## 2023-01-05 DIAGNOSIS — I73.9 PERIPHERAL VASCULAR DISEASE WITH CLAUDICATION: ICD-10-CM

## 2023-01-05 DIAGNOSIS — I25.10 CORONARY ARTERY DISEASE INVOLVING NATIVE CORONARY ARTERY OF NATIVE HEART WITHOUT ANGINA PECTORIS: ICD-10-CM

## 2023-01-05 LAB
ALBUMIN SERPL-MCNC: 3.8 G/DL (ref 3.5–5.2)
ALBUMIN/GLOB SERPL: 1.2 G/DL
ALP SERPL-CCNC: 101 U/L (ref 39–117)
ALT SERPL W P-5'-P-CCNC: 24 U/L (ref 1–41)
ANION GAP SERPL CALCULATED.3IONS-SCNC: 7 MMOL/L (ref 5–15)
AST SERPL-CCNC: 21 U/L (ref 1–40)
BASOPHILS # BLD AUTO: 0.06 10*3/MM3 (ref 0–0.2)
BASOPHILS NFR BLD AUTO: 0.6 % (ref 0–1.5)
BILIRUB SERPL-MCNC: 0.3 MG/DL (ref 0–1.2)
BUN SERPL-MCNC: 23 MG/DL (ref 8–23)
BUN/CREAT SERPL: 16.4 (ref 7–25)
CALCIUM SPEC-SCNC: 9.1 MG/DL (ref 8.6–10.5)
CHLORIDE SERPL-SCNC: 106 MMOL/L (ref 98–107)
CHOLEST SERPL-MCNC: 193 MG/DL (ref 0–200)
CO2 SERPL-SCNC: 24 MMOL/L (ref 22–29)
CREAT SERPL-MCNC: 1.4 MG/DL (ref 0.76–1.27)
DEPRECATED RDW RBC AUTO: 41.6 FL (ref 37–54)
EGFRCR SERPLBLD CKD-EPI 2021: 52.4 ML/MIN/1.73
EOSINOPHIL # BLD AUTO: 0.42 10*3/MM3 (ref 0–0.4)
EOSINOPHIL NFR BLD AUTO: 4.5 % (ref 0.3–6.2)
ERYTHROCYTE [DISTWIDTH] IN BLOOD BY AUTOMATED COUNT: 12.6 % (ref 12.3–15.4)
GLOBULIN UR ELPH-MCNC: 3.1 GM/DL
GLUCOSE SERPL-MCNC: 87 MG/DL (ref 65–99)
HCT VFR BLD AUTO: 36.1 % (ref 37.5–51)
HDLC SERPL-MCNC: 58 MG/DL (ref 40–60)
HGB BLD-MCNC: 11.9 G/DL (ref 13–17.7)
IMM GRANULOCYTES # BLD AUTO: 0.03 10*3/MM3 (ref 0–0.05)
IMM GRANULOCYTES NFR BLD AUTO: 0.3 % (ref 0–0.5)
LDLC SERPL CALC-MCNC: 121 MG/DL (ref 0–100)
LDLC/HDLC SERPL: 2.06 {RATIO}
LYMPHOCYTES # BLD AUTO: 1.82 10*3/MM3 (ref 0.7–3.1)
LYMPHOCYTES NFR BLD AUTO: 19.3 % (ref 19.6–45.3)
MCH RBC QN AUTO: 29.5 PG (ref 26.6–33)
MCHC RBC AUTO-ENTMCNC: 33 G/DL (ref 31.5–35.7)
MCV RBC AUTO: 89.6 FL (ref 79–97)
MONOCYTES # BLD AUTO: 0.99 10*3/MM3 (ref 0.1–0.9)
MONOCYTES NFR BLD AUTO: 10.5 % (ref 5–12)
NEUTROPHILS NFR BLD AUTO: 6.11 10*3/MM3 (ref 1.7–7)
NEUTROPHILS NFR BLD AUTO: 64.8 % (ref 42.7–76)
NRBC BLD AUTO-RTO: 0 /100 WBC (ref 0–0.2)
PLATELET # BLD AUTO: 253 10*3/MM3 (ref 140–450)
PMV BLD AUTO: 10.9 FL (ref 6–12)
POTASSIUM SERPL-SCNC: 5 MMOL/L (ref 3.5–5.2)
PROT SERPL-MCNC: 6.9 G/DL (ref 6–8.5)
RBC # BLD AUTO: 4.03 10*6/MM3 (ref 4.14–5.8)
SODIUM SERPL-SCNC: 137 MMOL/L (ref 136–145)
TRIGL SERPL-MCNC: 79 MG/DL (ref 0–150)
VLDLC SERPL-MCNC: 14 MG/DL (ref 5–40)
WBC NRBC COR # BLD: 9.43 10*3/MM3 (ref 3.4–10.8)

## 2023-01-05 PROCEDURE — 80053 COMPREHEN METABOLIC PANEL: CPT

## 2023-01-05 PROCEDURE — 36415 COLL VENOUS BLD VENIPUNCTURE: CPT

## 2023-01-05 PROCEDURE — 85025 COMPLETE CBC W/AUTO DIFF WBC: CPT

## 2023-01-05 PROCEDURE — 80061 LIPID PANEL: CPT

## 2023-01-10 ENCOUNTER — PREP FOR SURGERY (OUTPATIENT)
Dept: OTHER | Facility: HOSPITAL | Age: 78
End: 2023-01-10
Payer: MEDICARE

## 2023-01-10 DIAGNOSIS — I73.9 PERIPHERAL VASCULAR DISEASE WITH CLAUDICATION: Primary | ICD-10-CM

## 2023-01-10 RX ORDER — ASPIRIN 81 MG/1
81 TABLET ORAL DAILY
Status: CANCELLED | OUTPATIENT
Start: 2023-01-11

## 2023-01-10 RX ORDER — SODIUM CHLORIDE 0.9 % (FLUSH) 0.9 %
10 SYRINGE (ML) INJECTION AS NEEDED
Status: CANCELLED | OUTPATIENT
Start: 2023-01-10

## 2023-01-10 RX ORDER — SODIUM CHLORIDE 9 MG/ML
40 INJECTION, SOLUTION INTRAVENOUS AS NEEDED
Status: CANCELLED | OUTPATIENT
Start: 2023-01-10

## 2023-01-10 RX ORDER — ASPIRIN 81 MG/1
324 TABLET, CHEWABLE ORAL ONCE
Status: CANCELLED | OUTPATIENT
Start: 2023-01-10 | End: 2023-01-10

## 2023-01-10 RX ORDER — SODIUM CHLORIDE 0.9 % (FLUSH) 0.9 %
10 SYRINGE (ML) INJECTION EVERY 12 HOURS SCHEDULED
Status: CANCELLED | OUTPATIENT
Start: 2023-01-10

## 2023-01-17 ENCOUNTER — HOSPITAL ENCOUNTER (OUTPATIENT)
Facility: HOSPITAL | Age: 78
Setting detail: HOSPITAL OUTPATIENT SURGERY
Discharge: HOME | End: 2023-01-17
Attending: INTERNAL MEDICINE | Admitting: INTERNAL MEDICINE
Payer: MEDICARE

## 2023-01-17 VITALS
DIASTOLIC BLOOD PRESSURE: 92 MMHG | RESPIRATION RATE: 18 BRPM | TEMPERATURE: 98.4 F | HEART RATE: 60 BPM | WEIGHT: 209.66 LBS | SYSTOLIC BLOOD PRESSURE: 154 MMHG | BODY MASS INDEX: 29.35 KG/M2 | HEIGHT: 71 IN | OXYGEN SATURATION: 94 %

## 2023-01-17 DIAGNOSIS — I73.9 PERIPHERAL VASCULAR DISEASE WITH CLAUDICATION: ICD-10-CM

## 2023-01-17 DIAGNOSIS — I25.10 CORONARY ARTERY DISEASE INVOLVING NATIVE CORONARY ARTERY OF NATIVE HEART WITHOUT ANGINA PECTORIS: ICD-10-CM

## 2023-01-17 LAB
ANION GAP SERPL CALCULATED.3IONS-SCNC: 10 MMOL/L (ref 5–15)
BUN SERPL-MCNC: 25 MG/DL (ref 8–23)
BUN/CREAT SERPL: 21 (ref 7–25)
CALCIUM SPEC-SCNC: 8.8 MG/DL (ref 8.6–10.5)
CHLORIDE SERPL-SCNC: 111 MMOL/L (ref 98–107)
CHOLEST SERPL-MCNC: 145 MG/DL (ref 0–200)
CO2 SERPL-SCNC: 18 MMOL/L (ref 22–29)
CREAT BLDA-MCNC: 1.4 MG/DL (ref 0.6–1.3)
CREAT SERPL-MCNC: 1.19 MG/DL (ref 0.76–1.27)
DEPRECATED RDW RBC AUTO: 49.9 FL (ref 37–54)
EGFRCR SERPLBLD CKD-EPI 2021: 63.7 ML/MIN/1.73
ERYTHROCYTE [DISTWIDTH] IN BLOOD BY AUTOMATED COUNT: 13.9 % (ref 12.3–15.4)
GLUCOSE SERPL-MCNC: 88 MG/DL (ref 65–99)
HBA1C MFR BLD: 5.1 % (ref 4.8–5.6)
HCT VFR BLD AUTO: 38.3 % (ref 37.5–51)
HDLC SERPL-MCNC: 56 MG/DL (ref 40–60)
HGB BLD-MCNC: 12 G/DL (ref 13–17.7)
LDLC SERPL CALC-MCNC: 76 MG/DL (ref 0–100)
LDLC/HDLC SERPL: 1.37 {RATIO}
MCH RBC QN AUTO: 30.2 PG (ref 26.6–33)
MCHC RBC AUTO-ENTMCNC: 31.3 G/DL (ref 31.5–35.7)
MCV RBC AUTO: 96.5 FL (ref 79–97)
PLATELET # BLD AUTO: 236 10*3/MM3 (ref 140–450)
PMV BLD AUTO: 9.9 FL (ref 6–12)
POTASSIUM SERPL-SCNC: 4.9 MMOL/L (ref 3.5–5.2)
RBC # BLD AUTO: 3.97 10*6/MM3 (ref 4.14–5.8)
SODIUM SERPL-SCNC: 139 MMOL/L (ref 136–145)
TRIGL SERPL-MCNC: 62 MG/DL (ref 0–150)
VLDLC SERPL-MCNC: 13 MG/DL (ref 5–40)
WBC NRBC COR # BLD: 10.42 10*3/MM3 (ref 3.4–10.8)

## 2023-01-17 PROCEDURE — 75716 ARTERY X-RAYS ARMS/LEGS: CPT | Performed by: INTERNAL MEDICINE

## 2023-01-17 PROCEDURE — 0 IOPAMIDOL PER 1 ML: Performed by: INTERNAL MEDICINE

## 2023-01-17 PROCEDURE — 82565 ASSAY OF CREATININE: CPT

## 2023-01-17 PROCEDURE — C1769 GUIDE WIRE: HCPCS | Performed by: INTERNAL MEDICINE

## 2023-01-17 PROCEDURE — C1760 CLOSURE DEV, VASC: HCPCS | Performed by: INTERNAL MEDICINE

## 2023-01-17 PROCEDURE — C1894 INTRO/SHEATH, NON-LASER: HCPCS | Performed by: INTERNAL MEDICINE

## 2023-01-17 PROCEDURE — 85027 COMPLETE CBC AUTOMATED: CPT | Performed by: HOSPITALIST

## 2023-01-17 PROCEDURE — 25010000002 FENTANYL CITRATE (PF) 50 MCG/ML SOLUTION: Performed by: INTERNAL MEDICINE

## 2023-01-17 PROCEDURE — 36247 INS CATH ABD/L-EXT ART 3RD: CPT | Performed by: INTERNAL MEDICINE

## 2023-01-17 PROCEDURE — 25010000002 MIDAZOLAM PER 1 MG: Performed by: INTERNAL MEDICINE

## 2023-01-17 PROCEDURE — 75625 CONTRAST EXAM ABDOMINL AORTA: CPT | Performed by: INTERNAL MEDICINE

## 2023-01-17 PROCEDURE — C1887 CATHETER, GUIDING: HCPCS | Performed by: INTERNAL MEDICINE

## 2023-01-17 PROCEDURE — 83036 HEMOGLOBIN GLYCOSYLATED A1C: CPT | Performed by: HOSPITALIST

## 2023-01-17 PROCEDURE — 80061 LIPID PANEL: CPT | Performed by: HOSPITALIST

## 2023-01-17 PROCEDURE — 80048 BASIC METABOLIC PNL TOTAL CA: CPT | Performed by: HOSPITALIST

## 2023-01-17 PROCEDURE — 36415 COLL VENOUS BLD VENIPUNCTURE: CPT

## 2023-01-17 RX ORDER — ACETAMINOPHEN 325 MG/1
650 TABLET ORAL EVERY 4 HOURS PRN
Status: DISCONTINUED | OUTPATIENT
Start: 2023-01-17 | End: 2023-01-17 | Stop reason: HOSPADM

## 2023-01-17 RX ORDER — SODIUM CHLORIDE 9 MG/ML
75 INJECTION, SOLUTION INTRAVENOUS CONTINUOUS
Status: ACTIVE | OUTPATIENT
Start: 2023-01-17 | End: 2023-01-17

## 2023-01-17 RX ORDER — SODIUM CHLORIDE 9 MG/ML
3 INJECTION, SOLUTION INTRAVENOUS CONTINUOUS
Status: ACTIVE | OUTPATIENT
Start: 2023-01-17 | End: 2023-01-17

## 2023-01-17 RX ORDER — SODIUM CHLORIDE 0.9 % (FLUSH) 0.9 %
10 SYRINGE (ML) INJECTION AS NEEDED
Status: DISCONTINUED | OUTPATIENT
Start: 2023-01-17 | End: 2023-01-17 | Stop reason: HOSPADM

## 2023-01-17 RX ORDER — MIDAZOLAM HYDROCHLORIDE 1 MG/ML
INJECTION INTRAMUSCULAR; INTRAVENOUS
Status: DISCONTINUED | OUTPATIENT
Start: 2023-01-17 | End: 2023-01-17 | Stop reason: HOSPADM

## 2023-01-17 RX ORDER — LIDOCAINE HYDROCHLORIDE 10 MG/ML
INJECTION, SOLUTION EPIDURAL; INFILTRATION; INTRACAUDAL; PERINEURAL
Status: DISCONTINUED | OUTPATIENT
Start: 2023-01-17 | End: 2023-01-17 | Stop reason: HOSPADM

## 2023-01-17 RX ORDER — SODIUM CHLORIDE 9 MG/ML
40 INJECTION, SOLUTION INTRAVENOUS AS NEEDED
Status: DISCONTINUED | OUTPATIENT
Start: 2023-01-17 | End: 2023-01-17 | Stop reason: HOSPADM

## 2023-01-17 RX ORDER — SODIUM CHLORIDE 0.9 % (FLUSH) 0.9 %
10 SYRINGE (ML) INJECTION EVERY 12 HOURS SCHEDULED
Status: DISCONTINUED | OUTPATIENT
Start: 2023-01-17 | End: 2023-01-17 | Stop reason: HOSPADM

## 2023-01-17 RX ORDER — ASPIRIN 81 MG/1
324 TABLET, CHEWABLE ORAL ONCE
Status: COMPLETED | OUTPATIENT
Start: 2023-01-17 | End: 2023-01-17

## 2023-01-17 RX ORDER — ASPIRIN 81 MG/1
81 TABLET ORAL DAILY
Status: DISCONTINUED | OUTPATIENT
Start: 2023-01-18 | End: 2023-01-17 | Stop reason: HOSPADM

## 2023-01-17 RX ORDER — FENTANYL CITRATE 50 UG/ML
INJECTION, SOLUTION INTRAMUSCULAR; INTRAVENOUS
Status: DISCONTINUED | OUTPATIENT
Start: 2023-01-17 | End: 2023-01-17 | Stop reason: HOSPADM

## 2023-01-17 RX ADMIN — ASPIRIN 243 MG: 81 TABLET, CHEWABLE ORAL at 09:35

## 2023-01-17 RX ADMIN — SODIUM CHLORIDE 3 ML/KG/HR: 9 INJECTION, SOLUTION INTRAVENOUS at 09:24

## 2023-01-17 RX ADMIN — IOPAMIDOL 150 ML: 510 INJECTION, SOLUTION INTRAVASCULAR at 13:42

## 2023-01-17 NOTE — Clinical Note
Hemostasis started on the right femoral artery. Vascade MVP was used in achieving hemostasis. Closure device deployed in the vessel. Hemostasis achieved successfully.

## 2023-01-17 NOTE — H&P
[facility]   [address], [address]  [place], [place], [address]    [phone]  [facility]           HISTORY AND PHYSICAL NOTE    Patient Care Team:  Patient Care Team:  Sahra Toro APRN as PCP - General (Family Medicine)  Bill Ward MD as Consulting Physician (Cardiology)      Chief complaint: PAD, claudication.         HPI:    Thor Ca is a 77 y.o. male.    Cardiac focused problem list:  1. CAD   1. NSTEMI 9/2016 with intermediate LAD disease and no intervention.  Possible spontaneous revascularization  2. PAD  1. Chronic total occlusion of the left SFA unsuccessful attempt at antegrade crossing 10/2019.  2. Status post retrograde crossing, directional atherectomy and drug-coated balloon angioplasty to the left SFA in 1/2020  3. Aortic valve calcification  4. Carotid stenosis  5. Tobacco dependence quit with Chantix in 2019  6. Spinal stenosis back injection    Patient presents today for peripheral angiogram.  He has a history of left SFA  that is status post intervention in 1/2020.  He has had worsening left-sided claudication symptoms recently.  Underwent repeat DAYSI's revealing severe bilateral peripheral arterial vascular insufficiency with monophasic flow and evidence of right superficial femoral artery atherosclerotic disease.  Denies chest pain, palpitations, shortness of breath or lower extremity edema.     Review of Systems:  As noted in the HPI    PFSH:  Patient Active Problem List   Diagnosis   • Coronary artery disease involving native coronary artery of native heart without angina pectoris   • Essential hypertension   • Mixed hyperlipidemia   • Bilateral carotid artery stenosis   • Peripheral vascular disease with claudication (HCC)   • CKD (chronic kidney disease) stage 3, GFR 30-59 ml/min (HCC)   • Abdominal aortic aneurysm (AAA) without rupture       No current facility-administered medications on file prior to encounter.      Current Outpatient Medications on File Prior to Encounter   Medication Sig Dispense Refill   • amLODIPine (NORVASC) 5 MG tablet TAKE 1 TABLET BY MOUTH EVERY DAY 90 tablet 3   • aspirin 81 MG tablet Take 1 tablet by mouth Daily. 30 tablet 11   • carvedilol (COREG) 12.5 MG tablet TAKE 1 TABLET BY MOUTH EVERY 12 HOURS 180 tablet 3   • cilostazol (PLETAL) 50 MG tablet Take 1 tablet by mouth 2 (Two) Times a Day. 180 tablet 3   • lisinopril (PRINIVIL,ZESTRIL) 10 MG tablet TAKE 1 TABLET BY MOUTH DAILY 90 tablet 1   • rosuvastatin (CRESTOR) 40 MG tablet Take 1 tablet by mouth Every Night. 90 tablet 3   • sertraline (ZOLOFT) 50 MG tablet Take 50 mg by mouth Daily.         Social History     Socioeconomic History   • Marital status:    • Number of children: 3   Tobacco Use   • Smoking status: Former     Packs/day: 0.50     Years: 50.00     Pack years: 25.00     Types: Cigarettes     Quit date: 11/1/2019     Years since quitting: 3.2   • Smokeless tobacco: Never   Vaping Use   • Vaping Use: Never used   Substance and Sexual Activity   • Alcohol use: No     Comment: 1 beer a month, very rare   • Drug use: No   • Sexual activity: Defer            Objective:     Vital Sign Min/Max for last 24 hours  Temp  Min: 98.4 °F (36.9 °C)  Max: 98.4 °F (36.9 °C)   BP  Min: 108/63  Max: 109/65   Pulse  Min: 64  Max: 64   Resp  Min: 16  Max: 16   SpO2  Min: 95 %  Max: 95 %   No data recorded    No intake or output data in the 24 hours ending 01/17/23 0928        Vitals:    01/17/23 0917   BP: 109/65   Pulse:    Resp:    Temp:    SpO2:      CONSTITUTIONAL: No acute distress  RESPIRATORY: Normal effort. Clear to auscultation bilaterally without wheezing or rales  CARDIOVASCULAR: Regular rate and rhythm with normal S1 and S2.  Systolic murmur at the sternal border. Bilateral carotid bruits, right greater than left.   PERIPHERAL VASCULAR: No carotid bruit bilaterally.  Normal radial pulse. There is no lower extremity edema bilaterally.  Right femoral pulse 2+. Right DP pulse 1+, possible faint left DP.     Labs and radiologic results:  Today's results were reviewed by myself.    Cardiac Data:    EKG 12/02/2022:  NSR with 1st degree block    Results for orders placed in visit on 09/24/19    SCANNED - ECHOCARDIOGRAM      Tele:  NSR         Assessment and Plan:     Problem list:    Coronary artery disease involving native coronary artery of native heart without angina pectoris    Peripheral vascular disease with claudication (HCC)      ASSESSMENT:  1. PAD  a. Left SFA  status post intervention 1/2020  b. Currently with worsening left sided symptoms.  c. ABIs 12/07/2022 revealing severe bilateral peripheral arterial vascular insufficiency with bilateral popliteal artery and infrapopliteal monophasic flow. Evidence of right SFA disease  2. CAD  a. NSTEMI, 9/2016 with intermediate LAD disease and no intervention.  3. Hyperlipidemia   4. Hypertension   5. Aortic calcification    PLAN:  1. Peripheral angiogram today with Dr. Ward via right femoral and possible left pedal approach.  Will review labs when available.   2. The risks, benefits, and alternatives of the procedure have been reviewed and the patient wishes to proceed.   3. Further recommendations to follow angiogram.     Electronically signed by ALEXANDRA Jasso, 01/17/23, 9:28 AM EST.

## 2023-01-17 NOTE — Clinical Note
A 5 fr sheath was  inserted using micropuncture technique with ultrasound guidance into the right femoral artery.

## 2023-02-07 ENCOUNTER — TELEPHONE (OUTPATIENT)
Dept: CARDIOLOGY | Facility: CLINIC | Age: 78
End: 2023-02-07
Payer: MEDICARE

## 2023-02-07 NOTE — TELEPHONE ENCOUNTER
Patient contacted to review recommendations. Patient would like to hold off for scheduling now and will let us know when he is ready. Patient to call.

## 2023-02-07 NOTE — TELEPHONE ENCOUNTER
----- Message from Bill Ward MD sent at 2/7/2023 11:25 AM EST -----  Rose, Can you let patient know that I'd like to proceed with stenting the blockages we found a couple weeks back on his diagnostic peripheral alfredo? Bilateral iliac lesions; needs big covered stents. Stopped at that time due to both contrast limits and to size vessels and to size vessels offline to make sure we picked the right stents. Will go bilateral femoral access. If he's amenable, please order and have Oma schedule    Oma, once scheduled, can you let me know date? Thanks

## 2023-02-21 NOTE — TELEPHONE ENCOUNTER
----- Message from Bill Ward MD sent at 10/5/2021  7:30 PM EDT -----  Please let the patient's primary care provider know that we referred him to a [employer] cardiovascular surgeon, Dr. Encarnacion, at the patient's request.  ----- Message -----  From: Alicia Kaufman RegSched Rep  Sent: 10/5/2021   2:35 PM EDT  To: Bill Ward MD       I will place a referral to podiatry. I will also place a referral to orthopedic hand surgery.

## 2023-02-27 NOTE — TELEPHONE ENCOUNTER
----- Message from Bill Ward MD sent at 10/18/2019  8:17 AM EDT -----  I have urged this patient to buy blood pressure cuff.  Can you call him in a week or 2 to see if he is been checking it?  
Patient contacted to review BP recordings. Patient states that he has not been checking his BP daily, however, when he went to the doctor office this week his BP was 124/80. Patient advised to try to keep daily record of BP. Pt verbalizes understanding, all questions answered at this time.   
Seizure

## 2023-09-03 ENCOUNTER — APPOINTMENT (OUTPATIENT)
Dept: GENERAL RADIOLOGY | Facility: HOSPITAL | Age: 78
End: 2023-09-03
Payer: OTHER GOVERNMENT

## 2023-09-03 ENCOUNTER — HOSPITAL ENCOUNTER (EMERGENCY)
Facility: HOSPITAL | Age: 78
Discharge: HOME | End: 2023-09-03
Attending: EMERGENCY MEDICINE
Payer: OTHER GOVERNMENT

## 2023-09-03 VITALS
BODY MASS INDEX: 31.78 KG/M2 | RESPIRATION RATE: 18 BRPM | HEIGHT: 71 IN | WEIGHT: 227 LBS | HEART RATE: 81 BPM | TEMPERATURE: 98.9 F | OXYGEN SATURATION: 93 % | SYSTOLIC BLOOD PRESSURE: 114 MMHG | DIASTOLIC BLOOD PRESSURE: 76 MMHG

## 2023-09-03 DIAGNOSIS — S82.831A CLOSED FRACTURE OF DISTAL END OF RIGHT FIBULA, UNSPECIFIED FRACTURE MORPHOLOGY, INITIAL ENCOUNTER: Primary | ICD-10-CM

## 2023-09-03 PROCEDURE — 29125 APPL SHORT ARM SPLINT STATIC: CPT

## 2023-09-03 PROCEDURE — 96372 THER/PROPH/DIAG INJ SC/IM: CPT

## 2023-09-03 PROCEDURE — 6360000002 HC RX W HCPCS: Performed by: EMERGENCY MEDICINE

## 2023-09-03 PROCEDURE — 73610 X-RAY EXAM OF ANKLE: CPT

## 2023-09-03 PROCEDURE — 99284 EMERGENCY DEPT VISIT MOD MDM: CPT

## 2023-09-03 PROCEDURE — 73590 X-RAY EXAM OF LOWER LEG: CPT

## 2023-09-03 RX ORDER — CILOSTAZOL 50 MG/1
50 TABLET ORAL 2 TIMES DAILY
COMMUNITY

## 2023-09-03 RX ORDER — KETOROLAC TROMETHAMINE 30 MG/ML
15 INJECTION, SOLUTION INTRAMUSCULAR; INTRAVENOUS ONCE
Status: COMPLETED | OUTPATIENT
Start: 2023-09-03 | End: 2023-09-03

## 2023-09-03 RX ADMIN — KETOROLAC TROMETHAMINE 15 MG: 30 INJECTION, SOLUTION INTRAMUSCULAR; INTRAVENOUS at 14:36

## 2023-09-03 ASSESSMENT — LIFESTYLE VARIABLES
HOW OFTEN DO YOU HAVE A DRINK CONTAINING ALCOHOL: NEVER
HOW MANY STANDARD DRINKS CONTAINING ALCOHOL DO YOU HAVE ON A TYPICAL DAY: PATIENT DOES NOT DRINK

## 2023-09-03 ASSESSMENT — PAIN DESCRIPTION - LOCATION
LOCATION: ANKLE
LOCATION: ANKLE

## 2023-09-03 ASSESSMENT — PAIN DESCRIPTION - PAIN TYPE
TYPE: ACUTE PAIN
TYPE: ACUTE PAIN

## 2023-09-03 ASSESSMENT — PAIN SCALES - GENERAL
PAINLEVEL_OUTOF10: 5
PAINLEVEL_OUTOF10: 10
PAINLEVEL_OUTOF10: 10

## 2023-09-03 ASSESSMENT — PAIN DESCRIPTION - DESCRIPTORS
DESCRIPTORS: ACHING
DESCRIPTORS: ACHING

## 2023-09-03 ASSESSMENT — PAIN - FUNCTIONAL ASSESSMENT: PAIN_FUNCTIONAL_ASSESSMENT: 0-10

## 2023-09-03 ASSESSMENT — PAIN DESCRIPTION - FREQUENCY
FREQUENCY: CONTINUOUS
FREQUENCY: CONTINUOUS

## 2023-09-03 NOTE — ED TRIAGE NOTES
Pt brought into the ER by ambulance, states he had fallen and hurt his ankle this morning maround 8 am.  He said his leg just gave out causing him to fall. His wife needed a lift assist so she called ambulance. He also states he had fallen yesterday. Pain and swelling in the right ankle. Wife is at bedside.  Rosalie Peña Student Nurse

## 2023-09-03 NOTE — ED PROVIDER NOTES
Leatha Hurtado [address] [facility] ENCOUNTER        Pt Name: Rolanda Wesley  MRN: [id number]  [address] [facility] [address] [place] 1945  Date of evaluation: 9/3/2023  Provider: Karena Harris MD  PCP: SHANTA Cain  Note Started: 2:16 PM EDT 9/3/23    CHIEF COMPLAINT       Chief Complaint   Patient presents with    Ankle Pain    Fall       HISTORY OF PRESENT ILLNESS: 1 or more Elements     History from : Patient    Limitations to history : None    Jose Amanda is a 78 y.o. male who presents complaining of having fallen with his right leg giving just twisted and giving out he denies any injury other than to his right ankle he did not hit his head or lose consciousness he states the leg twisted up under him when it happened he now complains of right lateral ankle pain states it is sharp in nature he has pain with weightbearing and especially with dorsiflexion of the ankle denies any numbness or loss of motor function. Nursing Notes were all reviewed and agreed with or any disagreements were addressed in the HPI. REVIEW OF SYSTEMS :      Review of Systems     systems reviewed and negative except as in HPI/MDM    SURGICAL HISTORY     Past Surgical History:   Procedure Laterality Date    HYDROCELE EXCISION      KNEE SURGERY Left 05/2018    NECK SURGERY  10/28/2009    SKIN CANCER EXCISION         CURRENTMEDICATIONS       Previous Medications    ALIROCUMAB (PRALUENT) 75 MG/ML SOAJ INJECTION PEN    Inject 75 mg into the skin every 14 days    AMLODIPINE (NORVASC) 5 MG TABLET    TK 1 T PO QD    ASPIRIN 81 MG TABLET    Take 1 tablet by mouth daily Indications: 2 a day    CARVEDILOL (COREG) 12.5 MG TABLET    Take 1 tablet by mouth in the morning and 1 tablet in the evening. CILOSTAZOL (PLETAL) 50 MG TABLET    Take 1 tablet by mouth 2 times daily    LISINOPRIL (PRINIVIL;ZESTRIL) 10 MG TABLET    Take 10 mg by mouth daily    NONFORMULARY    Cholesterol shot.  Twice a month    SERTRALINE

## 2023-09-03 NOTE — ED NOTES
Discharge instructions and prescriptions reviewed with Pt. Verbalized and understood. Pt was given crutches and instructions given on how to ambulate. PCP follow up recommended and or return to ER as needed. No further needs or concerns voiced at this time. Pt discharged.  Lela Brunner Student Nurse     Devon Holt RN  09/03/23 1600

## 2023-09-03 NOTE — ED NOTES
Patient report from [facility]. 77 y/o male with right ankle swelling s/p fall this am. 94% RA, HR 85, 124/73 and 98.9.      Paige Pearson RN  09/03/23 4286

## 2024-01-24 RX ORDER — CILOSTAZOL 50 MG/1
50 TABLET ORAL 2 TIMES DAILY
Qty: 180 TABLET | Refills: 3 | Status: SHIPPED | OUTPATIENT
Start: 2024-01-24

## 2024-04-02 ENCOUNTER — OFFICE VISIT (OUTPATIENT)
Dept: NEUROSURGERY | Facility: CLINIC | Age: 79
End: 2024-04-02
Payer: OTHER GOVERNMENT

## 2024-04-02 VITALS
HEIGHT: 71 IN | DIASTOLIC BLOOD PRESSURE: 78 MMHG | BODY MASS INDEX: 33.04 KG/M2 | WEIGHT: 236 LBS | SYSTOLIC BLOOD PRESSURE: 116 MMHG

## 2024-04-02 DIAGNOSIS — M47.812 CERVICAL SPONDYLOSIS WITHOUT MYELOPATHY: Primary | ICD-10-CM

## 2024-04-02 DIAGNOSIS — Z98.1 HISTORY OF FUSION OF CERVICAL SPINE: ICD-10-CM

## 2024-04-02 DIAGNOSIS — M89.8X1 CHRONIC SCAPULAR PAIN: ICD-10-CM

## 2024-04-02 DIAGNOSIS — G54.2 CERVICAL NERVE ROOT COMPRESSION: ICD-10-CM

## 2024-04-02 DIAGNOSIS — G89.29 CHRONIC SCAPULAR PAIN: ICD-10-CM

## 2024-04-02 PROCEDURE — 99204 OFFICE O/P NEW MOD 45 MIN: CPT | Performed by: PHYSICIAN ASSISTANT

## 2024-04-02 NOTE — PROGRESS NOTES
Patient: Thor Ca  : 1945  Chart #: [id number]    Date of Service: 2024    Chief Complaint   Patient presents with    Back Pain   Scapular pain      Back Pain  Associated symptoms include numbness. Pertinent negatives include no abdominal pain, chest pain, dysuria, fever, headaches or weakness.     Mr. Ca is a 78-year-old, [place], gentleman who presents with right scapular pain that has been present for approximately 3 to 4 months, no precipitating trauma.  pain started with a sharp stabbing pain in the right scapula and now his pain now is a dull ache.  The patient thought it was his shoulder, he was referred to Ortho and he was evaluated by Dr. Rosa who reported it was not a shoulder issue and he was referred to [facility].  He presents here with x-rays of the cervical and thoracic spine.     Since the patient is a  he was given the option to see [facility].   [facility] and he also has a [facility].    Chronic Illnesses:  Past Medical History:   Diagnosis Date    Acid reflux     Arthritis     Coronary artery disease     Depression     Heart murmur     Hyperlipidemia     Hypertension     Myocardial infarction     Peripheral vascular disease          Past Surgical History:   Procedure Laterality Date    BACK SURGERY  10/25/2022    CARDIAC CATHETERIZATION N/A 2016    Procedure: Left Heart Cath;  Surgeon: Bill Ward MD;  Location:  [facility];  Service:     CARDIAC CATHETERIZATION N/A 10/17/2019    Procedure: Peripheral angiography;  Surgeon: Bill Ward MD;  Location:  [facility];  Service: Cardiovascular    CARDIAC CATHETERIZATION N/A 2020    Procedure: Peripheral angiography with imaging.;  Surgeon: Bill Ward MD;  Location:  [facility] CATH INVASIVE LOCATION;  Service: Cardiovascular;  Laterality: N/A;     CARDIAC CATHETERIZATION N/A 2023    Procedure: Peripheral angiography;  Surgeon: Bill Ward MD;  Location: [facility] CATH INVASIVE [facility];  Service: Cardiovascular;  Laterality: N/A;    HYDROCELE EXCISION / REPAIR      KNEE ARTHROPLASTY, PARTIAL REPLACEMENT  2018    NECK SURGERY      SKIN BIOPSY      SKIN CANCER EXCISION         No Known Allergies      Current Outpatient Medications:     amLODIPine (NORVASC) 5 MG tablet, TAKE 1 TABLET BY MOUTH EVERY DAY, Disp: 90 tablet, Rfl: 3    aspirin 81 MG tablet, Take 1 tablet by mouth Daily., Disp: 30 tablet, Rfl: 11    carvedilol (COREG) 12.5 MG tablet, TAKE 1 TABLET BY MOUTH EVERY 12 HOURS, Disp: 180 tablet, Rfl: 3    cilostazol (PLETAL) 50 MG tablet, Take 1 tablet by mouth 2 (Two) Times a Day., Disp: 180 tablet, Rfl: 3    lisinopril (PRINIVIL,ZESTRIL) 10 MG tablet, TAKE 1 TABLET BY MOUTH DAILY, Disp: 90 tablet, Rfl: 1    rosuvastatin (CRESTOR) 40 MG tablet, Take 1 tablet by mouth Every Night., Disp: 90 tablet, Rfl: 3    sertraline (ZOLOFT) 50 MG tablet, Take 1 tablet by mouth Daily., Disp: , Rfl:     Social History     Socioeconomic History    Marital status:     Number of children: 3   Tobacco Use    Smoking status: Former     Current packs/day: 0.00     Average packs/day: 0.5 packs/day for 50.0 years (25.0 ttl pk-yrs)     Types: Cigarettes     Start date: 1969     Quit date: 2019     Years since quittin.4    Smokeless tobacco: Never   Vaping Use    Vaping status: Never Used   Substance and Sexual Activity    Alcohol use: No     Comment: 1 beer a month, very rare    Drug use: No    Sexual activity: Defer       Family History   Problem Relation Age of Onset    Heart disease Mother     Diabetes Mother        BMI is >= 30 and <35. (Class 1 Obesity). The following options were offered after discussion;: referral to primary care       Social History    Tobacco Use      Smoking status: Former        Packs/day: 0.00        Years: 0.5 packs/day  for 50.0 years (25.0 ttl pk-yrs)        Types: Cigarettes        Start date: 1969        Quit date: 2019        Years since quittin.4      Smokeless tobacco: Never       Review of Systems   Constitutional:  Negative for activity change, appetite change, chills, diaphoresis, fatigue, fever and unexpected weight change.   HENT:  Negative for congestion, dental problem, drooling, ear discharge, ear pain, facial swelling, hearing loss, mouth sores, nosebleeds, postnasal drip, rhinorrhea, sinus pressure, sneezing, sore throat, tinnitus, trouble swallowing and voice change.    Eyes:  Negative for photophobia, pain, discharge, redness, itching and visual disturbance.   Respiratory:  Negative for apnea, cough, choking, chest tightness, shortness of breath, wheezing and stridor.    Cardiovascular:  Positive for leg swelling. Negative for chest pain and palpitations.   Gastrointestinal:  Negative for abdominal distention, abdominal pain, anal bleeding, blood in stool, constipation, diarrhea, nausea, rectal pain and vomiting.   Endocrine: Negative for cold intolerance, heat intolerance, polydipsia, polyphagia and polyuria.   Genitourinary:  Negative for decreased urine volume, difficulty urinating, dysuria, enuresis, flank pain, frequency, genital sores, hematuria, penile discharge, penile pain, penile swelling, scrotal swelling, testicular pain and urgency.   Musculoskeletal:  Positive for back pain. Negative for arthralgias, gait problem, joint swelling, myalgias, neck pain and neck stiffness.   Skin:  Negative for color change, pallor, rash and wound.   Allergic/Immunologic: Negative for environmental allergies, food allergies and immunocompromised state.   Neurological:  Positive for dizziness and numbness. Negative for tremors, seizures, syncope, facial asymmetry, speech difficulty, weakness, light-headedness and headaches.   Hematological:  Negative for adenopathy. Does not bruise/bleed easily.  "  Psychiatric/Behavioral:  Negative for agitation, behavioral problems, confusion, decreased concentration, dysphoric mood, hallucinations, self-injury, sleep disturbance and suicidal ideas. The patient is not nervous/anxious and is not hyperactive.    All other systems reviewed and are negative.       Gait & Balance Assessment:  Risk assessment for falls. Fall precautions:  such as;   Using gait aids a cane, walker at the appropriate height at all times for ambulation or if necessary a wheelchair  Removing all area rugs and coffee tables to create a safe environment at home  Ensure clean, dry floors  Wearing supportive footwear and properly fitting clothing  Ensure bed/chair is appropriate height and patient's feet can touch the floor  Using a shower transfer bench  Using walk-in shower and having shower safety bars installed  Ensure proper lighting, minimize glare  Have nightlights operational and in use  Participation in an exercise program for gait training, balance training and strength  Avoid carrying laundry up and down steps  Ensure proper compliance and organization of medications to avoid errors   Avoid use of over the counter sedatives and alcohol consumption  Ensure easy access to call bell, glasses, TV control, telephone  Ensure glasses/hearing aids are in use or close by (on top of night table)     Physical examination:  Blood pressure 116/78, height 180.3 cm (71\"), weight 107 kg (236 lb).  HEENT- normocephalic, atraumatic, sclera clear  Lungs-normal expansion, no wheezing  Heart-regular rate and rhythm  Extremities-positive pulses, no edema    Neurologic Exam  WDWN WM  A/A/C, speech clear, attention normal, conversant, answers questions appropriately, good historian.  His wife does help with some of the history  Cranial nerves II through XII are intact.  Motor examination does not reveal weakness in the , upper or lower extremities.   Gait is normal, balance is normal.   No tremors are " noted.  Palpation of the right medial scapula is mildly tender.    Radiographic Imaging:  For my review her cervical and thoracic x-rays.  His cervical x-rays show prior fusion from C3-C7.  Thoracic spine reveals normal alignment no compression deformities.  Changes are noted at C2-3 with anterior hypertrophic ossification.    Thoracic x-rays reveal plate and screws overlying T1.  There is mild wedge deformities of T2, T3.  There is degenerative disc at T7-8, T8-9, T9-10 and T10-11.  No fractures are noted, normal alignment      Medical Decision Making  Diagnoses and all orders for this visit:    1. Cervical spondylosis without myelopathy (Primary)  -     Ambulatory Referral to Neurosurgery    2. History of fusion of cervical spine    3. Chronic scapular pain    4. Cervical nerve root compression    Mr. Ca is a very pleasant 78-year-old  who presents to [facility] neurosurgery for evaluation of right scapular pain.  The patient has cervical and thoracic x-rays that are reviewed.  [facility].  [facility] spine and they are [facility].   neurosurgery has offered to see him regarding his cervical spine.  The patient did not know why he was at my office, after some investigation into the referral process,  neurosurgery said that they would be happy to see him regarding his cervical spine with a new referral.  The patient was offered to be referred to  neurosurgery since he has already established care with them regarding his lumbar spine.  The patient and wife were in agreement to go back to the [facility].    Any copied data from previous notes included in the (1) HPI, (2) PE, (3) MDM and/or assessment and plan has been reviewed and is accurate as of this day.    Saumya Iglesias, SIRI    Patient Care Team:  Ivon Bauer MD as PCP - General (Internal  Medicine)  Bill Ward MD as Consulting Physician (Cardiology)  Michelle Reese PA as Referring Physician (Physician Assistant)  Robert Christine MD as Consulting Physician (Neurosurgery)  Ivon Bauer MD as Consulting Physician (Internal Medicine)

## 2024-07-09 NOTE — PROGRESS NOTES
1. Have you seen another provider since your last visit? Cardiology    2. Have you had any other diagnostic tests since your last visit? No    3. Have you changed or stopped any medications since your last visit?  No
[facility]   Physical Therapy Treatment Note     Name: Melanie [facility] Number: [id number]    
Pt informed and v/u normal fit test results.
(83457)  Vasopneumatic Device Therapy () Jp Hardin, PT   
viral illness. Discussed with the patient that it will run its course. However, due to the patient's anxiety and reflux type symptoms nausea and vomiting could be secondary to that as well. Patient is being treated for anxiety and GERD as well. Gastroesophageal reflux disease     Will start the patient on Protonix. If symptoms persist, will likely need to refer to gastroenterologist.         Relevant Medications    pantoprazole (PROTONIX) 20 MG tablet      Other Visit Diagnoses     Colon cancer screening            Fit test ran today and was found to be normal.     Return if symptoms worsen or fail to improve.

## 2025-06-06 ENCOUNTER — HOSPITAL ENCOUNTER (INPATIENT)
Facility: HOSPITAL | Age: 80
LOS: 20 days | Discharge: ANOTHER HOSPITAL | DRG: 862 | End: 2025-06-26
Attending: INTERNAL MEDICINE | Admitting: INTERNAL MEDICINE
Payer: MEDICARE

## 2025-06-06 PROBLEM — R53.81 DECLINING FUNCTIONAL STATUS: Status: ACTIVE | Noted: 2025-06-06

## 2025-06-06 LAB — SARS-COV-2 RDRP RESP QL NAA+PROBE: NOT DETECTED

## 2025-06-06 PROCEDURE — 87635 SARS-COV-2 COVID-19 AMP PRB: CPT

## 2025-06-06 PROCEDURE — 1200000002 HC SEMI PRIVATE SWING BED

## 2025-06-06 PROCEDURE — 6360000002 HC RX W HCPCS: Performed by: PHYSICIAN ASSISTANT

## 2025-06-06 PROCEDURE — 6370000000 HC RX 637 (ALT 250 FOR IP): Performed by: PHYSICIAN ASSISTANT

## 2025-06-06 RX ORDER — SIMETHICONE 80 MG
80 TABLET,CHEWABLE ORAL 3 TIMES DAILY PRN
Status: DISCONTINUED | OUTPATIENT
Start: 2025-06-06 | End: 2025-06-21 | Stop reason: HOSPADM

## 2025-06-06 RX ORDER — OXYCODONE HYDROCHLORIDE 5 MG/1
5 TABLET ORAL EVERY 8 HOURS PRN
Refills: 0 | Status: DISCONTINUED | OUTPATIENT
Start: 2025-06-06 | End: 2025-06-06

## 2025-06-06 RX ORDER — HEPARIN SODIUM 5000 [USP'U]/ML
5000 INJECTION, SOLUTION INTRAVENOUS; SUBCUTANEOUS EVERY 8 HOURS SCHEDULED
Status: DISCONTINUED | OUTPATIENT
Start: 2025-06-06 | End: 2025-06-10 | Stop reason: ALTCHOICE

## 2025-06-06 RX ORDER — ASPIRIN 81 MG/1
162 TABLET ORAL DAILY
Status: DISCONTINUED | OUTPATIENT
Start: 2025-06-06 | End: 2025-06-06

## 2025-06-06 RX ORDER — ROSUVASTATIN CALCIUM 20 MG/1
40 TABLET, COATED ORAL EVERY EVENING
Status: DISCONTINUED | OUTPATIENT
Start: 2025-06-06 | End: 2025-06-08

## 2025-06-06 RX ORDER — CARVEDILOL 12.5 MG/1
12.5 TABLET ORAL EVERY 12 HOURS
Status: DISCONTINUED | OUTPATIENT
Start: 2025-06-06 | End: 2025-06-21 | Stop reason: HOSPADM

## 2025-06-06 RX ORDER — ACETAMINOPHEN 325 MG/1
650 TABLET ORAL EVERY 4 HOURS PRN
Status: DISCONTINUED | OUTPATIENT
Start: 2025-06-06 | End: 2025-06-21 | Stop reason: HOSPADM

## 2025-06-06 RX ORDER — FUROSEMIDE 40 MG/1
40 TABLET ORAL DAILY
Status: ON HOLD | COMMUNITY

## 2025-06-06 RX ORDER — SENNOSIDES 8.6 MG/1
2 TABLET ORAL DAILY
Status: ON HOLD | COMMUNITY

## 2025-06-06 RX ORDER — L. ACIDOPHILUS/L.BULGARICUS 1MM CELL
1 TABLET ORAL
Status: DISCONTINUED | OUTPATIENT
Start: 2025-06-06 | End: 2025-06-06 | Stop reason: CLARIF

## 2025-06-06 RX ORDER — ASPIRIN 81 MG/1
81 TABLET ORAL DAILY
Status: DISCONTINUED | OUTPATIENT
Start: 2025-06-07 | End: 2025-06-21 | Stop reason: HOSPADM

## 2025-06-06 RX ORDER — ROSUVASTATIN CALCIUM 40 MG/1
40 TABLET, COATED ORAL EVERY EVENING
Status: ON HOLD | COMMUNITY

## 2025-06-06 RX ORDER — LACTOBACILLUS RHAMNOSUS GG 10B CELL
1 CAPSULE ORAL
Status: DISCONTINUED | OUTPATIENT
Start: 2025-06-07 | End: 2025-06-21 | Stop reason: HOSPADM

## 2025-06-06 RX ORDER — RIVASTIGMINE 9.5 MG/24H
1 PATCH, EXTENDED RELEASE TRANSDERMAL DAILY
Status: ON HOLD | COMMUNITY

## 2025-06-06 RX ORDER — MULTIVITAMIN WITH IRON
1000 TABLET ORAL DAILY
Status: DISCONTINUED | OUTPATIENT
Start: 2025-06-06 | End: 2025-06-21 | Stop reason: HOSPADM

## 2025-06-06 RX ORDER — FUROSEMIDE 40 MG/1
40 TABLET ORAL DAILY
Status: DISCONTINUED | OUTPATIENT
Start: 2025-06-06 | End: 2025-06-21 | Stop reason: HOSPADM

## 2025-06-06 RX ORDER — RIVASTIGMINE 9.5 MG/24H
1 PATCH, EXTENDED RELEASE TRANSDERMAL DAILY
Status: DISCONTINUED | OUTPATIENT
Start: 2025-06-07 | End: 2025-06-21 | Stop reason: HOSPADM

## 2025-06-06 RX ORDER — POLYETHYLENE GLYCOL 3350 17 G/17G
17 POWDER, FOR SOLUTION ORAL DAILY PRN
Status: DISCONTINUED | OUTPATIENT
Start: 2025-06-06 | End: 2025-06-21 | Stop reason: HOSPADM

## 2025-06-06 RX ORDER — PANTOPRAZOLE SODIUM 40 MG/1
40 TABLET, DELAYED RELEASE ORAL
Status: DISCONTINUED | OUTPATIENT
Start: 2025-06-07 | End: 2025-06-21 | Stop reason: HOSPADM

## 2025-06-06 RX ORDER — GABAPENTIN 300 MG/1
300 CAPSULE ORAL 2 TIMES DAILY
Status: ON HOLD | COMMUNITY

## 2025-06-06 RX ORDER — OXYCODONE HYDROCHLORIDE 5 MG/1
5 TABLET ORAL EVERY 6 HOURS PRN
Status: ON HOLD | COMMUNITY

## 2025-06-06 RX ORDER — OXYCODONE HYDROCHLORIDE 5 MG/1
5 TABLET ORAL EVERY 6 HOURS PRN
Refills: 0 | Status: DISCONTINUED | OUTPATIENT
Start: 2025-06-06 | End: 2025-06-21 | Stop reason: HOSPADM

## 2025-06-06 RX ORDER — ACETAMINOPHEN 325 MG/1
975 TABLET ORAL EVERY 6 HOURS PRN
Status: ON HOLD | COMMUNITY

## 2025-06-06 RX ORDER — HEPARIN SODIUM 5000 [USP'U]/ML
5000 INJECTION, SOLUTION INTRAVENOUS; SUBCUTANEOUS EVERY 8 HOURS SCHEDULED
Status: ON HOLD | COMMUNITY

## 2025-06-06 RX ORDER — POLYETHYLENE GLYCOL 3350 17 G/17G
17 POWDER, FOR SOLUTION ORAL DAILY
Status: ON HOLD | COMMUNITY

## 2025-06-06 RX ORDER — CILOSTAZOL 100 MG/1
50 TABLET ORAL 2 TIMES DAILY
Status: DISCONTINUED | OUTPATIENT
Start: 2025-06-06 | End: 2025-06-21 | Stop reason: HOSPADM

## 2025-06-06 RX ORDER — SENNOSIDES 8.6 MG/1
2 TABLET ORAL DAILY
Status: DISCONTINUED | OUTPATIENT
Start: 2025-06-06 | End: 2025-06-16

## 2025-06-06 RX ORDER — GABAPENTIN 300 MG/1
300 CAPSULE ORAL 2 TIMES DAILY
Status: DISCONTINUED | OUTPATIENT
Start: 2025-06-06 | End: 2025-06-20

## 2025-06-06 RX ORDER — CARBOXYMETHYLCELLULOSE SODIUM 5 MG/ML
1 SOLUTION/ DROPS OPHTHALMIC 4 TIMES DAILY
Status: DISCONTINUED | OUTPATIENT
Start: 2025-06-06 | End: 2025-06-21 | Stop reason: HOSPADM

## 2025-06-06 RX ORDER — SIMETHICONE 80 MG
80 TABLET,CHEWABLE ORAL 3 TIMES DAILY PRN
Status: ON HOLD | COMMUNITY

## 2025-06-06 RX ADMIN — POLYETHYLENE GLYCOL 3350 17 G: 17 POWDER, FOR SOLUTION ORAL at 17:32

## 2025-06-06 RX ADMIN — GABAPENTIN 300 MG: 300 CAPSULE ORAL at 20:45

## 2025-06-06 RX ADMIN — CARVEDILOL 12.5 MG: 12.5 TABLET, FILM COATED ORAL at 20:45

## 2025-06-06 RX ADMIN — HEPARIN SODIUM 5000 UNITS: 5000 INJECTION INTRAVENOUS; SUBCUTANEOUS at 20:45

## 2025-06-06 RX ADMIN — CILOSTAZOL 50 MG: 100 TABLET ORAL at 20:45

## 2025-06-06 RX ADMIN — CARBOXYMETHYLCELLULOSE SODIUM 1 DROP: 0.5 SOLUTION/ DROPS OPHTHALMIC at 20:46

## 2025-06-06 RX ADMIN — ROSUVASTATIN 40 MG: 20 TABLET, FILM COATED ORAL at 20:45

## 2025-06-06 NOTE — PROGRESS NOTES
[facility]  Pt post surgery for L hip fx.  Pt with bandages in place with no drainage noted.  Pt is A&Ox4 at this time with confusion at times per spouse.  Pt with no complaints at this time.  Will monitor.

## 2025-06-06 NOTE — PROGRESS NOTES
4 Eyes Skin Assessment     NAME:  Jose Palma  YOB: 1945  MEDICAL RECORD NUMBER:  [id number]    The patient is being assessed for  Admission    I agree that at least one RN has performed a thorough Head to Toe Skin Assessment on the patient. ALL assessment sites listed below have been assessed.      Areas assessed by both nurses:    {Pressure Areas Assessed:54148}        Does the Patient have a Wound? Yes wound(s) were present on assessment. LDA wound assessment was Initiated and completed by RN       Estuardo Prevention initiated by RN: No  Wound Care Orders initiated by RN: No    Pressure Injury (Stage 3,4, Unstageable, DTI, NWPT, and Complex wounds) if present, place Wound referral order by RN under : No    New Ostomies, if present place, Ostomy referral order under : Yes     Nurse 1 eSignature: Electronically signed by Nazanin Murphy RN on 6/6/25 at 4:38 PM EDT    **SHARE this note so that the co-signing nurse can place an eSignature**    Nurse 2 eSignature: {Esignature:[id number]}

## 2025-06-06 NOTE — PROGRESS NOTES
Medication History completed with [facility].    Added:  -Acetaminophen 325 mg 3t q6h prn  -Duo Neb q4h prn  -Artificial Tears qid  -Gabapentin 30 mg bid  -Heparin 5,000 u/1ml q8h  -oxycodone 5 mg q6h prn for 3 days while in rehab  -polyethylene glycol powder 1 packet qd while on oxycodone  -Rivastigmine patch 9.5 mg qd  -Rosuvastatin 40 mg qd  -Sennosides 8.6 2t qd  -Simethicone 80 mg tid prn   -Vitmain B 12 1,000 mcg qd  -Furosemide 40 mg qd  -Lactobacillus Chewable Tablet 1 tid with meals    Changed:  -Removed 2 a day under indications.  Per discharge summary, pt is getting EC aspirin 81 mg po daily.    Not Taking:  - Vitamin D 25 mcg qd    Of note, lisinopril was being held [facility] due to decreased renal function with recommendation to monitor renal function and BP and resume when clinically appropriate.

## 2025-06-06 NOTE — CARE COORDINATION
6/6/2025    I certify that the skilled nursing and/or rehabilitation services are required to be given on a daily basis, which as a practical matter can only be provided in a skilled nursing unit on an inpatient basis.    Electronically signed by Purnima John RN on 6/6/2025 at 4:10 PM

## 2025-06-06 NOTE — FLOWSHEET NOTE
06/06/25 1632   Assessment   Charting Type Admission   Psychosocial   Psychosocial (WDL) WDL   Neurological   Neuro (WDL) WDL   Amado Coma Scale   Eye Opening 4   Best Verbal Response 5   Best Motor Response 6   Kim Coma Scale Score 15   HEENT (Head, Ears, Eyes, Nose, & Throat)   HEENT (WDL) X   Right Eye Glasses   Left Eye Glasses   Teeth Dentures upper;Dentures lower   Respiratory   Respiratory (WDL) WDL   Cardiac   Cardiac (WDL) WDL   Gastrointestinal   Abdominal (WDL) X   Last BM (including prior to admit) 06/03/25   Genitourinary   Genitourinary (WDL) WDL   Peripheral Vascular   Peripheral Vascular (WDL) X   RLE Edema +1;Pitting   LLE Edema +2;Pitting   Skin Integumentary    Skin Color Dusky   Skin Condition/Temp Cool;Dry   Skin Integrity Abrasion   Location LUE   Multiple Skin Integrity Sites Yes   Skin Integumentary (WDL) X   Skin Integrity Site 2   Skin Integrity Location 2 Wound (see LDA)   Location 2 L hip   Preventative Dressing Yes   Assessed this shift Yes   Musculoskeletal   RL Extremity Weakness;Unsteady   LL Extremity Weakness;Unsteady;Surgery   Musculoskeletal (WDL) X   Musculoskeletal Details   L Hip Surgery

## 2025-06-07 PROBLEM — M54.10 RADICULOPATHY: Status: ACTIVE | Noted: 2025-06-07

## 2025-06-07 PROBLEM — N17.9 ACUTE RENAL FAILURE SUPERIMPOSED ON STAGE 3 CHRONIC KIDNEY DISEASE (HCC): Status: ACTIVE | Noted: 2025-06-07

## 2025-06-07 PROBLEM — Z87.81 HISTORY OF FRACTURE OF LEFT HIP: Status: ACTIVE | Noted: 2025-06-07

## 2025-06-07 PROBLEM — N18.30 ACUTE RENAL FAILURE SUPERIMPOSED ON STAGE 3 CHRONIC KIDNEY DISEASE (HCC): Status: ACTIVE | Noted: 2025-06-07

## 2025-06-07 PROBLEM — M54.50 CHRONIC BILATERAL LOW BACK PAIN: Status: ACTIVE | Noted: 2025-06-07

## 2025-06-07 PROBLEM — I73.9 PAD (PERIPHERAL ARTERY DISEASE): Status: ACTIVE | Noted: 2025-06-07

## 2025-06-07 PROBLEM — I71.40 AAA (ABDOMINAL AORTIC ANEURYSM): Status: ACTIVE | Noted: 2025-06-07

## 2025-06-07 PROBLEM — R01.1 CARDIAC MURMUR: Status: ACTIVE | Noted: 2025-06-07

## 2025-06-07 PROBLEM — G89.29 CHRONIC BILATERAL LOW BACK PAIN: Status: ACTIVE | Noted: 2025-06-07

## 2025-06-07 PROBLEM — D64.9 ANEMIA: Status: ACTIVE | Noted: 2025-06-07

## 2025-06-07 LAB
25(OH)D3 SERPL-MCNC: 18.1 NG/ML (ref 32–100)
ALBUMIN SERPL-MCNC: 3.1 G/DL (ref 3.4–4.8)
ALBUMIN/GLOB SERPL: 1 {RATIO} (ref 0.8–2)
ALP SERPL-CCNC: 83 U/L (ref 25–100)
ALT SERPL-CCNC: 14 U/L (ref 4–36)
ANION GAP SERPL CALCULATED.3IONS-SCNC: 10 MMOL/L (ref 3–16)
AST SERPL-CCNC: 34 U/L (ref 8–33)
BASOPHILS # BLD: 0 K/UL (ref 0–0.1)
BASOPHILS NFR BLD: 0.3 %
BILIRUB SERPL-MCNC: 0.7 MG/DL (ref 0.3–1.2)
BUN SERPL-MCNC: 31 MG/DL (ref 6–20)
CALCIUM SERPL-MCNC: 8.9 MG/DL (ref 8.3–10.6)
CHLORIDE SERPL-SCNC: 104 MMOL/L (ref 98–107)
CO2 SERPL-SCNC: 24 MMOL/L (ref 20–30)
CREAT SERPL-MCNC: 3.1 MG/DL (ref 0.8–1.3)
EOSINOPHIL # BLD: 0.3 K/UL (ref 0–0.4)
EOSINOPHIL NFR BLD: 3.1 %
ERYTHROCYTE [DISTWIDTH] IN BLOOD BY AUTOMATED COUNT: 12.4 % (ref 11–16)
FERRITIN SERPL IA-MCNC: 278 NG/ML (ref 22–322)
FOLATE SERPL-MCNC: 6.16 NG/ML
GFR SERPLBLD CREATININE-BSD FMLA CKD-EPI: 20 ML/MIN/{1.73_M2}
GLOBULIN SER CALC-MCNC: 3.2 G/DL
GLUCOSE SERPL-MCNC: 103 MG/DL (ref 74–106)
HCT VFR BLD AUTO: 24.6 % (ref 40–54)
HGB BLD-MCNC: 7.9 G/DL (ref 13–18)
IMM GRANULOCYTES # BLD: 0 K/UL
IMM GRANULOCYTES NFR BLD: 0.2 % (ref 0–5)
IRON SATN MFR SERPL: 14 % (ref 20–50)
IRON SERPL-MCNC: 28 UG/DL (ref 59–158)
LYMPHOCYTES # BLD: 1.4 K/UL (ref 1.5–4)
LYMPHOCYTES NFR BLD: 16.6 %
MCH RBC QN AUTO: 29.9 PG (ref 27–32)
MCHC RBC AUTO-ENTMCNC: 32.1 G/DL (ref 31–35)
MCV RBC AUTO: 93.2 FL (ref 80–100)
MONOCYTES # BLD: 0.9 K/UL (ref 0.2–0.8)
MONOCYTES NFR BLD: 10.6 %
NEUTROPHILS # BLD: 5.9 K/UL (ref 2–7.5)
NEUTS SEG NFR BLD: 69.2 %
PLATELET # BLD AUTO: 253 K/UL (ref 150–400)
PMV BLD AUTO: 9.9 FL (ref 6–10)
POTASSIUM SERPL-SCNC: 3.9 MMOL/L (ref 3.4–5.1)
PROT SERPL-MCNC: 6.3 G/DL (ref 6.4–8.3)
RBC # BLD AUTO: 2.64 M/UL (ref 4.5–6)
SODIUM SERPL-SCNC: 138 MMOL/L (ref 136–145)
TIBC SERPL-MCNC: 199 UG/DL (ref 250–450)
TSH SERPL-MCNC: 1.15 UIU/ML (ref 0.27–4.2)
VIT B12 SERPL-MCNC: 1483 PG/ML (ref 211–911)
WBC # BLD AUTO: 8.6 K/UL (ref 4–11)

## 2025-06-07 PROCEDURE — 1200000002 HC SEMI PRIVATE SWING BED

## 2025-06-07 PROCEDURE — 36415 COLL VENOUS BLD VENIPUNCTURE: CPT

## 2025-06-07 PROCEDURE — 83550 IRON BINDING TEST: CPT

## 2025-06-07 PROCEDURE — 83540 ASSAY OF IRON: CPT

## 2025-06-07 PROCEDURE — 84443 ASSAY THYROID STIM HORMONE: CPT

## 2025-06-07 PROCEDURE — 6360000002 HC RX W HCPCS: Performed by: PHYSICIAN ASSISTANT

## 2025-06-07 PROCEDURE — 82306 VITAMIN D 25 HYDROXY: CPT

## 2025-06-07 PROCEDURE — 99305 1ST NF CARE MODERATE MDM 35: CPT | Performed by: INTERNAL MEDICINE

## 2025-06-07 PROCEDURE — 85025 COMPLETE CBC W/AUTO DIFF WBC: CPT

## 2025-06-07 PROCEDURE — 82746 ASSAY OF FOLIC ACID SERUM: CPT

## 2025-06-07 PROCEDURE — 82728 ASSAY OF FERRITIN: CPT

## 2025-06-07 PROCEDURE — 6370000000 HC RX 637 (ALT 250 FOR IP): Performed by: PHYSICIAN ASSISTANT

## 2025-06-07 PROCEDURE — 80053 COMPREHEN METABOLIC PANEL: CPT

## 2025-06-07 PROCEDURE — 82607 VITAMIN B-12: CPT

## 2025-06-07 PROCEDURE — 5A0935A ASSISTANCE WITH RESPIRATORY VENTILATION, LESS THAN 24 CONSECUTIVE HOURS, HIGH NASAL FLOW/VELOCITY: ICD-10-PCS | Performed by: INTERNAL MEDICINE

## 2025-06-07 RX ORDER — MECOBALAMIN 5000 MCG
10 TABLET,DISINTEGRATING ORAL NIGHTLY PRN
Status: DISCONTINUED | OUTPATIENT
Start: 2025-06-07 | End: 2025-06-21 | Stop reason: HOSPADM

## 2025-06-07 RX ORDER — ERGOCALCIFEROL 1.25 MG/1
50000 CAPSULE, LIQUID FILLED ORAL WEEKLY
Status: DISCONTINUED | OUTPATIENT
Start: 2025-06-07 | End: 2025-06-21 | Stop reason: HOSPADM

## 2025-06-07 RX ORDER — FOLIC ACID 1 MG/1
1 TABLET ORAL DAILY
Status: DISCONTINUED | OUTPATIENT
Start: 2025-06-07 | End: 2025-06-21 | Stop reason: HOSPADM

## 2025-06-07 RX ORDER — NICOTINE 21 MG/24HR
1 PATCH, TRANSDERMAL 24 HOURS TRANSDERMAL DAILY
Status: DISCONTINUED | OUTPATIENT
Start: 2025-06-07 | End: 2025-06-21 | Stop reason: HOSPADM

## 2025-06-07 RX ADMIN — Medication 10 MG: at 21:19

## 2025-06-07 RX ADMIN — ERGOCALCIFEROL 50000 UNITS: 1.25 CAPSULE ORAL at 16:51

## 2025-06-07 RX ADMIN — SENNOSIDES 17.2 MG: 8.6 TABLET, FILM COATED ORAL at 08:30

## 2025-06-07 RX ADMIN — GABAPENTIN 300 MG: 300 CAPSULE ORAL at 21:19

## 2025-06-07 RX ADMIN — ASPIRIN 81 MG: 81 TABLET, COATED ORAL at 08:30

## 2025-06-07 RX ADMIN — ROSUVASTATIN 40 MG: 20 TABLET, FILM COATED ORAL at 16:51

## 2025-06-07 RX ADMIN — HEPARIN SODIUM 5000 UNITS: 5000 INJECTION INTRAVENOUS; SUBCUTANEOUS at 06:01

## 2025-06-07 RX ADMIN — CARVEDILOL 12.5 MG: 12.5 TABLET, FILM COATED ORAL at 16:51

## 2025-06-07 RX ADMIN — POLYETHYLENE GLYCOL 3350 17 G: 17 POWDER, FOR SOLUTION ORAL at 10:18

## 2025-06-07 RX ADMIN — CILOSTAZOL 50 MG: 100 TABLET ORAL at 08:30

## 2025-06-07 RX ADMIN — CARBOXYMETHYLCELLULOSE SODIUM 1 DROP: 0.5 SOLUTION/ DROPS OPHTHALMIC at 08:34

## 2025-06-07 RX ADMIN — HEPARIN SODIUM 5000 UNITS: 5000 INJECTION INTRAVENOUS; SUBCUTANEOUS at 14:41

## 2025-06-07 RX ADMIN — PANTOPRAZOLE SODIUM 40 MG: 40 TABLET, DELAYED RELEASE ORAL at 06:01

## 2025-06-07 RX ADMIN — HEPARIN SODIUM 5000 UNITS: 5000 INJECTION INTRAVENOUS; SUBCUTANEOUS at 21:19

## 2025-06-07 RX ADMIN — Medication 200 MG: at 14:42

## 2025-06-07 RX ADMIN — Medication 1 CAPSULE: at 08:30

## 2025-06-07 RX ADMIN — CARVEDILOL 12.5 MG: 12.5 TABLET, FILM COATED ORAL at 06:01

## 2025-06-07 RX ADMIN — CILOSTAZOL 50 MG: 100 TABLET ORAL at 21:19

## 2025-06-07 RX ADMIN — OXYCODONE 5 MG: 5 TABLET ORAL at 10:18

## 2025-06-07 RX ADMIN — FUROSEMIDE 40 MG: 40 TABLET ORAL at 08:30

## 2025-06-07 RX ADMIN — CARBOXYMETHYLCELLULOSE SODIUM 1 DROP: 0.5 SOLUTION/ DROPS OPHTHALMIC at 21:19

## 2025-06-07 RX ADMIN — FOLIC ACID 1 MG: 1 TABLET ORAL at 16:51

## 2025-06-07 RX ADMIN — GABAPENTIN 300 MG: 300 CAPSULE ORAL at 08:33

## 2025-06-07 RX ADMIN — CYANOCOBALAMIN TAB 500 MCG 1000 MCG: 500 TAB at 08:30

## 2025-06-07 ASSESSMENT — PAIN SCALES - GENERAL: PAINLEVEL_OUTOF10: 6

## 2025-06-07 NOTE — PLAN OF CARE
Problem: Discharge Planning  Goal: Discharge to home or other facility with appropriate resources  6/7/2025 1138 by Nazanin Murphy, RN  Outcome: Progressing  6/7/2025 1133 by Nazanin Murphy, RN  Outcome: Progressing     Problem: Safety - Adult  Goal: Free from fall injury  Outcome: Progressing     Problem: Skin/Tissue Integrity  Goal: Skin integrity remains intact  Description: 1.  Monitor for areas of redness and/or skin breakdown2.  Assess vascular access sites hourly3.  Every 4-6 hours minimum:  Change oxygen saturation probe site4.  Every 4-6 hours:  If on nasal continuous positive airway pressure, respiratory therapy assess nares and determine need for appliance change or resting period  Outcome: Progressing

## 2025-06-07 NOTE — FLOWSHEET NOTE
06/07/25 1134   Assessment   Charting Type Shift assessment   Psychosocial   Psychosocial (WDL) WDL   Neurological   Neuro (WDL) WDL   Kim Coma Scale   Eye Opening 4   Best Verbal Response 5   Best Motor Response 6   Treece Coma Scale Score 15   HEENT (Head, Ears, Eyes, Nose, & Throat)   HEENT (WDL) X   Right Eye Glasses   Left Eye Glasses   Teeth Dentures upper;Dentures lower   Respiratory   Respiratory (WDL) WDL   Cardiac   Cardiac (WDL) WDL   Gastrointestinal   Abdominal (WDL) X   Genitourinary   Genitourinary (WDL) WDL   Peripheral Vascular   Peripheral Vascular (WDL) X   RLE Edema +1;Pitting   LLE Edema +2;Pitting   Skin Integumentary    Skin Color Dusky   Skin Condition/Temp Dry;Cool   Skin Integrity Abrasion   Location LUE   Skin Integumentary (WDL) X   Skin Integrity Site 2   Skin Integrity Location 2 Wound (see LDA)   Location 2 L hip   Preventative Dressing Yes   Musculoskeletal   RL Extremity Weakness;Unsteady   LL Extremity Weakness;Unsteady;Surgery   Musculoskeletal (WDL) X   Musculoskeletal Details   L Hip Surgery

## 2025-06-07 NOTE — H&P
History and Physical    Patient:  Jose Palma    CHIEF COMPLAINT:    Declining functional status     HISTORY OF PRESENT ILLNESS:   The patient is a 79 y.o. male with past medical history of CAD, PAD, hypertension, CKD 3, AAA, tobacco abuse, chronic lower back pain, chronic C6 motor radiculopathy, and others who [facility] functional status following a fall which resulted in a left hip intertrochanteric hip fracture.  History obtained from patient and chart review. Still awaiting most of the records from the [facility] for review so somewhat limited. He is status post surgical stabilization of the left hip with CMN on 5/30/2025.  Patient's baseline creatinine around 2 and trended up throughout his hospital course to greater than 3. Felt to be related to ATN with trauma and surgical intervention. Home lisinopril held. He was monitored and discharged here when Cr trending down. 3.3 at outside facility and 3.1 upon arrival here. Hgb 7.9 today. No previous hemoglobin available for review and have requested record. He is sitting up in the recliner today in no distress. States he is having some pain in his left hip here and theere. He has chronic lower back pain and also difficulty with strength and motor skills in his upper extremities which he has been seeing neurosurgery about. Underwent EMG which revealed chronic C6 motor radiculopathy. It was also recommended he go to pain clinic for epidural for lower back pain and was undergoing further workup for that with worsening parasthesias in lower extremities. He has had several falls over the last several months. He denies melena or hematochezia. Denies SOA. He is aware of his AAA and heart murmur. [facility] and has been going for regular monitoring but he doesn't remember when last imaging was. As above, awaiting further record.      Past Medical History:      Diagnosis Date    Cancer (HCC)     basal cell carcinoma from left side of

## 2025-06-07 NOTE — PLAN OF CARE
Problem: Discharge Planning  Goal: Discharge to home or other facility with appropriate resources  Outcome: Progressing     Problem: Skin/Tissue Integrity  Goal: Skin integrity remains intact  Description: 1.  Monitor for areas of redness and/or skin breakdown2.  Assess vascular access sites hourly3.  Every 4-6 hours minimum:  Change oxygen saturation probe site4.  Every 4-6 hours:  If on nasal continuous positive airway pressure, respiratory therapy assess nares and determine need for appliance change or resting period  Outcome: Progressing

## 2025-06-07 NOTE — FLOWSHEET NOTE
06/06/25 2051   Assessment   Charting Type Shift assessment   Psychosocial   Psychosocial (WDL) WDL   Neurological   Neuro (WDL) WDL   Kim Coma Scale   Eye Opening 4   Best Verbal Response 5   Best Motor Response 6   [place] Coma Scale Score 15   HEENT (Head, Ears, Eyes, Nose, & Throat)   HEENT (WDL) X   Right Eye Glasses   Left Eye Glasses   Teeth Dentures upper;Dentures lower   Respiratory   Respiratory (WDL) WDL   Cardiac   Cardiac (WDL) WDL   Gastrointestinal   Abdominal (WDL) X   Last BM (including prior to admit) 06/06/25   Genitourinary   Genitourinary (WDL) WDL   Peripheral Vascular   Peripheral Vascular (WDL) X   RLE Edema +1;Pitting   LLE Edema +2;Pitting   Skin Integumentary    Skin Color Dusky   Skin Condition/Temp Dry;Cool   Skin Integrity Abrasion   Location LUE   Multiple Skin Integrity Sites Yes   Skin Integumentary (WDL) X   Skin Integrity Site 2   Skin Integrity Location 2 Wound (see LDA)   Location 2 L hip   Preventative Dressing Yes   Musculoskeletal   RL Extremity Weakness;Unsteady   LL Extremity Weakness;Unsteady;Surgery   Musculoskeletal (WDL) X   Musculoskeletal Details   L Hip Surgery

## 2025-06-08 PROBLEM — E55.9 VITAMIN D DEFICIENCY: Status: ACTIVE | Noted: 2025-06-08

## 2025-06-08 LAB
ALBUMIN SERPL-MCNC: 3 G/DL (ref 3.4–4.8)
ALBUMIN/GLOB SERPL: 0.9 {RATIO} (ref 0.8–2)
ALP SERPL-CCNC: 87 U/L (ref 25–100)
ALT SERPL-CCNC: 12 U/L (ref 4–36)
ANION GAP SERPL CALCULATED.3IONS-SCNC: 11 MMOL/L (ref 3–16)
AST SERPL-CCNC: 27 U/L (ref 8–33)
BASOPHILS # BLD: 0 K/UL (ref 0–0.1)
BASOPHILS NFR BLD: 0.4 %
BILIRUB SERPL-MCNC: 0.6 MG/DL (ref 0.3–1.2)
BUN SERPL-MCNC: 33 MG/DL (ref 6–20)
CALCIUM SERPL-MCNC: 8.9 MG/DL (ref 8.3–10.6)
CHLORIDE SERPL-SCNC: 102 MMOL/L (ref 98–107)
CO2 SERPL-SCNC: 24 MMOL/L (ref 20–30)
CREAT SERPL-MCNC: 3 MG/DL (ref 0.8–1.3)
EOSINOPHIL # BLD: 0.3 K/UL (ref 0–0.4)
EOSINOPHIL NFR BLD: 3.1 %
ERYTHROCYTE [DISTWIDTH] IN BLOOD BY AUTOMATED COUNT: 12.3 % (ref 11–16)
GFR SERPLBLD CREATININE-BSD FMLA CKD-EPI: 21 ML/MIN/{1.73_M2}
GLOBULIN SER CALC-MCNC: 3.4 G/DL
GLUCOSE SERPL-MCNC: 107 MG/DL (ref 74–106)
HCT VFR BLD AUTO: 24.5 % (ref 40–54)
HGB BLD-MCNC: 7.8 G/DL (ref 13–18)
IMM GRANULOCYTES # BLD: 0 K/UL
IMM GRANULOCYTES NFR BLD: 0.5 % (ref 0–5)
LYMPHOCYTES # BLD: 1.3 K/UL (ref 1.5–4)
LYMPHOCYTES NFR BLD: 14.9 %
MCH RBC QN AUTO: 29.9 PG (ref 27–32)
MCHC RBC AUTO-ENTMCNC: 31.8 G/DL (ref 31–35)
MCV RBC AUTO: 93.9 FL (ref 80–100)
MONOCYTES # BLD: 1 K/UL (ref 0.2–0.8)
MONOCYTES NFR BLD: 12 %
NEUTROPHILS # BLD: 5.8 K/UL (ref 2–7.5)
NEUTS SEG NFR BLD: 69.1 %
PLATELET # BLD AUTO: 285 K/UL (ref 150–400)
PMV BLD AUTO: 10 FL (ref 6–10)
POTASSIUM SERPL-SCNC: 3.8 MMOL/L (ref 3.4–5.1)
PROT SERPL-MCNC: 6.4 G/DL (ref 6.4–8.3)
RBC # BLD AUTO: 2.61 M/UL (ref 4.5–6)
SODIUM SERPL-SCNC: 137 MMOL/L (ref 136–145)
WBC # BLD AUTO: 8.4 K/UL (ref 4–11)

## 2025-06-08 PROCEDURE — 94761 N-INVAS EAR/PLS OXIMETRY MLT: CPT

## 2025-06-08 PROCEDURE — 85025 COMPLETE CBC W/AUTO DIFF WBC: CPT

## 2025-06-08 PROCEDURE — 1200000002 HC SEMI PRIVATE SWING BED

## 2025-06-08 PROCEDURE — 6370000000 HC RX 637 (ALT 250 FOR IP): Performed by: PHYSICIAN ASSISTANT

## 2025-06-08 PROCEDURE — 36415 COLL VENOUS BLD VENIPUNCTURE: CPT

## 2025-06-08 PROCEDURE — 6360000002 HC RX W HCPCS: Performed by: PHYSICIAN ASSISTANT

## 2025-06-08 PROCEDURE — 80053 COMPREHEN METABOLIC PANEL: CPT

## 2025-06-08 PROCEDURE — 2700000000 HC OXYGEN THERAPY PER DAY

## 2025-06-08 RX ORDER — ROSUVASTATIN CALCIUM 10 MG/1
10 TABLET, COATED ORAL EVERY EVENING
Status: DISCONTINUED | OUTPATIENT
Start: 2025-06-08 | End: 2025-06-15

## 2025-06-08 RX ADMIN — CILOSTAZOL 50 MG: 100 TABLET ORAL at 20:21

## 2025-06-08 RX ADMIN — FUROSEMIDE 40 MG: 40 TABLET ORAL at 08:35

## 2025-06-08 RX ADMIN — ASPIRIN 81 MG: 81 TABLET, COATED ORAL at 08:36

## 2025-06-08 RX ADMIN — HEPARIN SODIUM 5000 UNITS: 5000 INJECTION INTRAVENOUS; SUBCUTANEOUS at 15:01

## 2025-06-08 RX ADMIN — PANTOPRAZOLE SODIUM 40 MG: 40 TABLET, DELAYED RELEASE ORAL at 04:00

## 2025-06-08 RX ADMIN — CILOSTAZOL 50 MG: 100 TABLET ORAL at 08:35

## 2025-06-08 RX ADMIN — CARBOXYMETHYLCELLULOSE SODIUM 1 DROP: 0.5 SOLUTION/ DROPS OPHTHALMIC at 15:01

## 2025-06-08 RX ADMIN — HEPARIN SODIUM 5000 UNITS: 5000 INJECTION INTRAVENOUS; SUBCUTANEOUS at 20:21

## 2025-06-08 RX ADMIN — SENNOSIDES 17.2 MG: 8.6 TABLET, FILM COATED ORAL at 08:35

## 2025-06-08 RX ADMIN — CARVEDILOL 12.5 MG: 12.5 TABLET, FILM COATED ORAL at 04:00

## 2025-06-08 RX ADMIN — HEPARIN SODIUM 5000 UNITS: 5000 INJECTION INTRAVENOUS; SUBCUTANEOUS at 04:01

## 2025-06-08 RX ADMIN — ROSUVASTATIN CALCIUM 10 MG: 10 TABLET, FILM COATED ORAL at 18:05

## 2025-06-08 RX ADMIN — GABAPENTIN 300 MG: 300 CAPSULE ORAL at 20:21

## 2025-06-08 RX ADMIN — FOLIC ACID 1 MG: 1 TABLET ORAL at 08:35

## 2025-06-08 RX ADMIN — CARVEDILOL 12.5 MG: 12.5 TABLET, FILM COATED ORAL at 18:05

## 2025-06-08 RX ADMIN — OXYCODONE 5 MG: 5 TABLET ORAL at 18:08

## 2025-06-08 RX ADMIN — GABAPENTIN 300 MG: 300 CAPSULE ORAL at 08:36

## 2025-06-08 RX ADMIN — Medication 10 MG: at 20:20

## 2025-06-08 RX ADMIN — OXYCODONE 5 MG: 5 TABLET ORAL at 08:45

## 2025-06-08 RX ADMIN — Medication 1 CAPSULE: at 08:36

## 2025-06-08 RX ADMIN — Medication 200 MG: at 15:02

## 2025-06-08 RX ADMIN — POLYETHYLENE GLYCOL 3350 17 G: 17 POWDER, FOR SOLUTION ORAL at 08:45

## 2025-06-08 RX ADMIN — CYANOCOBALAMIN TAB 500 MCG 1000 MCG: 500 TAB at 08:36

## 2025-06-08 ASSESSMENT — PAIN SCALES - GENERAL
PAINLEVEL_OUTOF10: 7
PAINLEVEL_OUTOF10: 7

## 2025-06-08 ASSESSMENT — PAIN DESCRIPTION - LOCATION
LOCATION: HIP
LOCATION: HIP

## 2025-06-08 NOTE — PROGRESS NOTES
Pharmacy Renal Adjustment    Jose Palma is a 79 y.o. male. Pharmacy has renally adjusted medications per protocol.    Recent Labs     06/07/25  0431 06/08/25  0436   BUN 31* 33*       Recent Labs     06/07/25  0431 06/08/25  0436   CREATININE 3.1* 3.0*       Estimated Creatinine Clearance: 26 mL/min (A) (based on SCr of 3 mg/dL (H)).    Height:   Ht Readings from Last 1 Encounters:   06/06/25 1.829 m (6')     Weight:  Wt Readings from Last 1 Encounters:   06/06/25 103.5 kg (228 lb 4 oz)       CKD stage: 3     Plan: Adjust the following medications based on renal function:           Rosuvastatin 40mg PO QD --> rosuvastatin 10mg PO QD    Electronically signed by Felicity Chatman RPH on 6/8/2025 at 11:12 AM    =

## 2025-06-08 NOTE — PLAN OF CARE
Problem: Discharge Planning  Goal: Discharge to home or other facility with appropriate resources  6/7/2025 2126 by Ricky Zafar RN  Outcome: Progressing     Problem: Safety - Adult  Goal: Free from fall injury  6/8/2025 0956 by Nazanin Murphy RN  Outcome: Progressing  6/7/2025 2126 by Ricky Zafar RN  Outcome: Progressing     Problem: Skin/Tissue Integrity  Goal: Skin integrity remains intact  Description: 1.  Monitor for areas of redness and/or skin breakdown2.  Assess vascular access sites hourly3.  Every 4-6 hours minimum:  Change oxygen saturation probe site4.  Every 4-6 hours:  If on nasal continuous positive airway pressure, respiratory therapy assess nares and determine need for appliance change or resting period  6/7/2025 2126 by Ricky Zafar RN  Outcome: Progressing     Problem: ABCDS Injury Assessment  Goal: Absence of physical injury  6/7/2025 2126 by Ricky Zafar RN  Outcome: Progressing

## 2025-06-08 NOTE — FLOWSHEET NOTE
06/08/25 0957   Assessment   Charting Type Shift assessment   Psychosocial   Psychosocial (WDL) WDL   Neurological   Neuro (WDL) WDL   Kim Coma Scale   Eye Opening 4   Best Verbal Response 5   Best Motor Response 6   [place] Coma Scale Score 15   HEENT (Head, Ears, Eyes, Nose, & Throat)   HEENT (WDL) X   Right Eye Glasses   Left Eye Glasses   Teeth Dentures upper;Dentures lower   Respiratory   Respiratory (WDL) WDL   Cardiac   Cardiac (WDL) WDL   Gastrointestinal   Abdominal (WDL) X   Genitourinary   Genitourinary (WDL) WDL   Peripheral Vascular   Peripheral Vascular (WDL) X   RLE Edema +1;Pitting   LLE Edema +2;Pitting   Skin Integumentary    Skin Color Dusky   Skin Condition/Temp Dry;Cool   Skin Integrity Abrasion   Location LUE   Skin Integumentary (WDL) X   Skin Integrity Site 2   Skin Integrity Location 2 Wound (see LDA)   Location 2 L hip   Musculoskeletal   RL Extremity Weakness;Unsteady   LL Extremity Weakness;Unsteady;Surgery   Musculoskeletal (WDL) X   Musculoskeletal Details   L Hip Surgery

## 2025-06-08 NOTE — FLOWSHEET NOTE
06/07/25 2124   Assessment   Charting Type Shift assessment   Psychosocial   Psychosocial (WDL) WDL   Neurological   Neuro (WDL) WDL   Kim Coma Scale   Eye Opening 4   Best Verbal Response 5   Best Motor Response 6   [place] Coma Scale Score 15   HEENT (Head, Ears, Eyes, Nose, & Throat)   HEENT (WDL) X   Right Eye Glasses   Left Eye Glasses   Teeth Dentures upper;Dentures lower   Respiratory   Respiratory (WDL) WDL   Cardiac   Cardiac (WDL) WDL   Gastrointestinal   Abdominal (WDL) X   Last BM (including prior to admit) 06/06/25   Genitourinary   Genitourinary (WDL) WDL   Peripheral Vascular   Peripheral Vascular (WDL) X   RLE Edema +1;Pitting   LLE Edema +2;Pitting   Skin Integumentary    Skin Color Dusky   Skin Condition/Temp Cool;Dry   Skin Integrity Abrasion   Location LUE   Skin Integumentary (WDL) X   Skin Integrity Site 2   Skin Integrity Location 2 Wound (see LDA)   Location 2 L hip   Preventative Dressing Yes   Musculoskeletal   RL Extremity Weakness;Unsteady   LL Extremity Weakness;Unsteady;Surgery   Musculoskeletal (WDL) X   Musculoskeletal Details   L Hip Surgery

## 2025-06-08 NOTE — PLAN OF CARE
Problem: Discharge Planning  Goal: Discharge to home or other facility with appropriate resources  6/7/2025 2126 by Ricky Zafar RN  Outcome: Progressing  6/7/2025 1138 by Nazanin Murphy RN  Outcome: Progressing  6/7/2025 1133 by Nazanin Murphy RN  Outcome: Progressing     Problem: Safety - Adult  Goal: Free from fall injury  6/7/2025 2126 by Ricky Zafar RN  Outcome: Progressing  6/7/2025 1138 by Nazanin Murphy RN  Outcome: Progressing     Problem: Skin/Tissue Integrity  Goal: Skin integrity remains intact  Description: 1.  Monitor for areas of redness and/or skin breakdown2.  Assess vascular access sites hourly3.  Every 4-6 hours minimum:  Change oxygen saturation probe site4.  Every 4-6 hours:  If on nasal continuous positive airway pressure, respiratory therapy assess nares and determine need for appliance change or resting period  6/7/2025 2126 by Ricky Zafar RN  Outcome: Progressing  6/7/2025 1133 by Nazanin Murphy RN  Outcome: Progressing     Problem: ABCDS Injury Assessment  Goal: Absence of physical injury  Outcome: Progressing

## 2025-06-09 LAB
ANION GAP SERPL CALCULATED.3IONS-SCNC: 11 MMOL/L (ref 3–16)
BASOPHILS # BLD: 0 K/UL (ref 0–0.1)
BASOPHILS NFR BLD: 0.5 %
BUN SERPL-MCNC: 30 MG/DL (ref 6–20)
CALCIUM SERPL-MCNC: 9.1 MG/DL (ref 8.3–10.6)
CHLORIDE SERPL-SCNC: 102 MMOL/L (ref 98–107)
CO2 SERPL-SCNC: 25 MMOL/L (ref 20–30)
CREAT SERPL-MCNC: 2.8 MG/DL (ref 0.8–1.3)
EOSINOPHIL # BLD: 0.3 K/UL (ref 0–0.4)
EOSINOPHIL NFR BLD: 3.8 %
ERYTHROCYTE [DISTWIDTH] IN BLOOD BY AUTOMATED COUNT: 12.4 % (ref 11–16)
GFR SERPLBLD CREATININE-BSD FMLA CKD-EPI: 22 ML/MIN/{1.73_M2}
GLUCOSE SERPL-MCNC: 97 MG/DL (ref 74–106)
HCT VFR BLD AUTO: 25 % (ref 40–54)
HGB BLD-MCNC: 7.9 G/DL (ref 13–18)
IMM GRANULOCYTES # BLD: 0 K/UL
IMM GRANULOCYTES NFR BLD: 0.5 % (ref 0–5)
LYMPHOCYTES # BLD: 1.5 K/UL (ref 1.5–4)
LYMPHOCYTES NFR BLD: 18.7 %
MCH RBC QN AUTO: 29.6 PG (ref 27–32)
MCHC RBC AUTO-ENTMCNC: 31.6 G/DL (ref 31–35)
MCV RBC AUTO: 93.6 FL (ref 80–100)
MONOCYTES # BLD: 0.9 K/UL (ref 0.2–0.8)
MONOCYTES NFR BLD: 11.7 %
NEUTROPHILS # BLD: 5.1 K/UL (ref 2–7.5)
NEUTS SEG NFR BLD: 64.8 %
PLATELET # BLD AUTO: 310 K/UL (ref 150–400)
PMV BLD AUTO: 10 FL (ref 6–10)
POTASSIUM SERPL-SCNC: 3.6 MMOL/L (ref 3.4–5.1)
RBC # BLD AUTO: 2.67 M/UL (ref 4.5–6)
SODIUM SERPL-SCNC: 138 MMOL/L (ref 136–145)
WBC # BLD AUTO: 7.9 K/UL (ref 4–11)

## 2025-06-09 PROCEDURE — 6370000000 HC RX 637 (ALT 250 FOR IP): Performed by: PHYSICIAN ASSISTANT

## 2025-06-09 PROCEDURE — 6360000002 HC RX W HCPCS: Performed by: PHYSICIAN ASSISTANT

## 2025-06-09 PROCEDURE — 97530 THERAPEUTIC ACTIVITIES: CPT

## 2025-06-09 PROCEDURE — 85025 COMPLETE CBC W/AUTO DIFF WBC: CPT

## 2025-06-09 PROCEDURE — 36415 COLL VENOUS BLD VENIPUNCTURE: CPT

## 2025-06-09 PROCEDURE — 97530 THERAPEUTIC ACTIVITIES: CPT | Performed by: OCCUPATIONAL THERAPIST

## 2025-06-09 PROCEDURE — 97802 MEDICAL NUTRITION INDIV IN: CPT

## 2025-06-09 PROCEDURE — 1200000002 HC SEMI PRIVATE SWING BED

## 2025-06-09 PROCEDURE — 80048 BASIC METABOLIC PNL TOTAL CA: CPT

## 2025-06-09 PROCEDURE — 97165 OT EVAL LOW COMPLEX 30 MIN: CPT | Performed by: OCCUPATIONAL THERAPIST

## 2025-06-09 PROCEDURE — 97161 PT EVAL LOW COMPLEX 20 MIN: CPT

## 2025-06-09 RX ORDER — PREDNISONE 20 MG/1
20 TABLET ORAL DAILY
Status: COMPLETED | OUTPATIENT
Start: 2025-06-09 | End: 2025-06-11

## 2025-06-09 RX ADMIN — FUROSEMIDE 40 MG: 40 TABLET ORAL at 09:06

## 2025-06-09 RX ADMIN — Medication 1 CAPSULE: at 09:06

## 2025-06-09 RX ADMIN — CARBOXYMETHYLCELLULOSE SODIUM 1 DROP: 0.5 SOLUTION/ DROPS OPHTHALMIC at 09:14

## 2025-06-09 RX ADMIN — CARVEDILOL 12.5 MG: 12.5 TABLET, FILM COATED ORAL at 17:12

## 2025-06-09 RX ADMIN — PANTOPRAZOLE SODIUM 40 MG: 40 TABLET, DELAYED RELEASE ORAL at 06:14

## 2025-06-09 RX ADMIN — CILOSTAZOL 50 MG: 100 TABLET ORAL at 09:06

## 2025-06-09 RX ADMIN — GABAPENTIN 300 MG: 300 CAPSULE ORAL at 21:27

## 2025-06-09 RX ADMIN — CILOSTAZOL 50 MG: 100 TABLET ORAL at 21:28

## 2025-06-09 RX ADMIN — CARBOXYMETHYLCELLULOSE SODIUM 1 DROP: 0.5 SOLUTION/ DROPS OPHTHALMIC at 17:13

## 2025-06-09 RX ADMIN — SENNOSIDES 17.2 MG: 8.6 TABLET, FILM COATED ORAL at 09:06

## 2025-06-09 RX ADMIN — HEPARIN SODIUM 5000 UNITS: 5000 INJECTION INTRAVENOUS; SUBCUTANEOUS at 14:28

## 2025-06-09 RX ADMIN — CARVEDILOL 12.5 MG: 12.5 TABLET, FILM COATED ORAL at 06:14

## 2025-06-09 RX ADMIN — CYANOCOBALAMIN TAB 500 MCG 1000 MCG: 500 TAB at 09:06

## 2025-06-09 RX ADMIN — OXYCODONE 5 MG: 5 TABLET ORAL at 21:28

## 2025-06-09 RX ADMIN — GABAPENTIN 300 MG: 300 CAPSULE ORAL at 09:06

## 2025-06-09 RX ADMIN — Medication 200 MG: at 14:31

## 2025-06-09 RX ADMIN — CARBOXYMETHYLCELLULOSE SODIUM 1 DROP: 0.5 SOLUTION/ DROPS OPHTHALMIC at 21:27

## 2025-06-09 RX ADMIN — PREDNISONE 20 MG: 20 TABLET ORAL at 11:48

## 2025-06-09 RX ADMIN — CARBOXYMETHYLCELLULOSE SODIUM 1 DROP: 0.5 SOLUTION/ DROPS OPHTHALMIC at 13:15

## 2025-06-09 RX ADMIN — HEPARIN SODIUM 5000 UNITS: 5000 INJECTION INTRAVENOUS; SUBCUTANEOUS at 06:15

## 2025-06-09 RX ADMIN — FOLIC ACID 1 MG: 1 TABLET ORAL at 09:06

## 2025-06-09 RX ADMIN — HEPARIN SODIUM 5000 UNITS: 5000 INJECTION INTRAVENOUS; SUBCUTANEOUS at 21:30

## 2025-06-09 RX ADMIN — ROSUVASTATIN CALCIUM 10 MG: 10 TABLET, FILM COATED ORAL at 17:12

## 2025-06-09 RX ADMIN — OXYCODONE 5 MG: 5 TABLET ORAL at 11:48

## 2025-06-09 RX ADMIN — ASPIRIN 81 MG: 81 TABLET, COATED ORAL at 09:14

## 2025-06-09 ASSESSMENT — PAIN SCALES - GENERAL
PAINLEVEL_OUTOF10: 7
PAINLEVEL_OUTOF10: 7
PAINLEVEL_OUTOF10: 6

## 2025-06-09 ASSESSMENT — PAIN DESCRIPTION - LOCATION: LOCATION: HIP

## 2025-06-09 NOTE — PROGRESS NOTES
Factors: for use of urinal                 Cognition  Overall Cognitive Status: WFL  Orientation  Overall Orientation Status: Within Functional Limits          Goals  Short Term Goals  Short Term Goal 1: Pt will be MOD I with fx mobility tasks  Short Term Goal 2: Pt will be MOD I with fx transfers  Short Term Goal 3: Pt will be MOD I with dressing tasks  Short Term Goal 4: Pt will be MOD I with toileting tasks  Short Term Goal 5: Pt will be MOD I with bathing tasks      Therapy Time   Individual Concurrent Group Co-treatment   Time In 1111         Time Out 1147         Minutes 36             This note serves as a DC summary in the event of pt discharge.      Radha Mora, OT

## 2025-06-09 NOTE — PROGRESS NOTES
Physical Therapy  Facility/Department: [facility] MED SURG  Rehabilitation Physical Therapy Initial Assessment    NAME: Jose Palma  : 1945 (79 y.o.)  MRN: [id number]  CODE STATUS: Full Code    Date of Service: 25      Past Medical History:   Diagnosis Date    Cancer (HCC)     basal cell carcinoma from left side of nose    Heart attack (HCC)     Hypertension      Past Surgical History:   Procedure Laterality Date    HYDROCELE EXCISION      KNEE SURGERY Left 2018    NECK SURGERY  10/28/2009    SKIN CANCER EXCISION         Chart Reviewed: Yes  Patient assessed for rehabilitation services?: Yes  Family/Caregiver Present: Yes  Referring Practitioner: Edyta Villasenor PA-C  Referral Date : 25  Diagnosis: Declining functional status, s/p L hip ORIF    Restrictions:   WBAT LLE, fall risk     SUBJECTIVE  Subjective: Pt presents supine in bed, pleasant and agreeable to PT evaluation.  Pt's wife is present for history.  Pt reports falling and fracturing his hip and undergoing surgical repair on 25.  Pt was at the [facility] and states he ambulated twice there for a few steps each time.         Prior Level of Function:  Social/Functional History  Lives With: Spouse  Type of Home: House  Home Layout: Two level, Able to Live on Main level with bedroom/bathroom  Home Access: Stairs to enter without rails  Bathroom Shower/Tub: Tub/Shower unit, Shower chair with back  Bathroom Toilet: Handicap height  Bathroom Equipment: Shower chair  Bathroom Accessibility: Accessible  Home Equipment: Cane, Walker - Rolling, Rollator  Has the patient had two or more falls in the past year or any fall with injury in the past year?: Yes  Receives Help From: Family (wife, and daughter lives close if needed)  Prior Level of Assist for ADLs: Independent  Prior Level of Assist for Homemaking: Independent  Homemaking Responsibilities: No (wife completes)  Prior Level of Assist for Ambulation: Independent household ambulator,

## 2025-06-09 NOTE — PLAN OF CARE
Problem: Discharge Planning  Goal: Discharge to home or other facility with appropriate resources  6/9/2025 1017 by Cassie Jones RN  Outcome: Progressing  Flowsheets (Taken 6/9/2025 0914)  Discharge to home or other facility with appropriate resources:   Identify barriers to discharge with patient and caregiver   Identify discharge learning needs (meds, wound care, etc)  6/8/2025 2215 by Edyta Medina RN  Outcome: Progressing     Problem: Safety - Adult  Goal: Free from fall injury  6/9/2025 1017 by Cassie Jones RN  Outcome: Progressing  6/8/2025 2215 by Edyta Medina RN  Outcome: Progressing     Problem: Skin/Tissue Integrity  Goal: Skin integrity remains intact  Description: 1.  Monitor for areas of redness and/or skin breakdown2.  Assess vascular access sites hourly3.  Every 4-6 hours minimum:  Change oxygen saturation probe site4.  Every 4-6 hours:  If on nasal continuous positive airway pressure, respiratory therapy assess nares and determine need for appliance change or resting period  6/9/2025 1017 by Cassie Jones RN  Outcome: Progressing  Flowsheets (Taken 6/9/2025 0914)  Skin Integrity Remains Intact: Monitor for areas of redness and/or skin breakdown  6/8/2025 2215 by Edyta Medina RN  Outcome: Progressing     Problem: ABCDS Injury Assessment  Goal: Absence of physical injury  6/9/2025 1017 by Cassie Jones RN  Outcome: Progressing  6/8/2025 2215 by Edyta Medina RN  Outcome: Progressing

## 2025-06-09 NOTE — CONSULTS
Comprehensive Nutrition Assessment    Type and Reason for Visit:  Initial, Consult, Wound (New swing bed)    Nutrition Recommendations/Plan:   Continue current diet as medically appropriate and tolerated.   Continue to encourage PO intakes as appropriate. Avg of 14-38% x 4 meals.   Consider vitamin C supplement as appropriate d/t hx of tobacco use.   Ensure Plus and Cornelius ordered BID.  Consider MVI.    RD to follow patient and available PRN.      Malnutrition Assessment:  Malnutrition Status:  No malnutrition (06/09/25 1141)    Context:        Findings of the 6 clinical characteristics of malnutrition:  Energy Intake:     Weight Loss:        Body Fat Loss:        Muscle Mass Loss:       Fluid Accumulation:        Strength:       Nutrition Assessment:    Patient admitted with declining functional status. Patient is at moderate-high risk for ongoing nutritional compromise r/t wound and poor-fair intakes.    Nutrition Related Findings:    Obesity. PMH tobacco use, HTN, basal cell carcinoma, CKD, CAD, vitamin D deficiency, GERD, HLD. Medications: folic acid, vitamin D, venofer, lactobacillus, B12, lasix. Dentures. Trace pitting edema RLE, pitting + 1 edema LLE. Labs: BUN 30, Cr 2.8, GFR 22, glu , alb 3.0, vitamin D 18.1, iron 28. Wound Type: Surgical Incision (Sx wound left hip, abrasion LUE)       Current Nutrition Intake & Therapies:    Average Meal Intake: 1-25%, 26-50% (14-38% x 4 meals)  Average Supplements Intake: None Ordered  ADULT DIET; Regular  ADULT ORAL NUTRITION SUPPLEMENT; Lunch, Dinner; Wound Healing Oral Supplement  ADULT ORAL NUTRITION SUPPLEMENT; Breakfast, Lunch; Standard High Calorie/High Protein Oral Supplement    Anthropometric Measures:  Height: 182.9 cm (6')  Ideal Body Weight (IBW): 178 lbs (81 kg)       Current Body Weight: 103.4 kg (228 lb), 128.1 % IBW. Weight Source: Bed scale  Current BMI (kg/m2): 30.9           Weight Adjustment For: No Adjustment                 BMI Categories:

## 2025-06-09 NOTE — FLOWSHEET NOTE
06/09/25 0914   Assessment   Charting Type Shift assessment   Psychosocial   Psychosocial (WDL) WDL   Neurological   Neuro (WDL) WDL   Kim Coma Scale   Eye Opening 4   Best Verbal Response 5   Best Motor Response 6   [place] Coma Scale Score 15   HEENT (Head, Ears, Eyes, Nose, & Throat)   HEENT (WDL) X   Right Eye Glasses   Left Eye Glasses   Teeth Dentures upper;Dentures lower   Respiratory   Respiratory (WDL) WDL   Cardiac   Cardiac (WDL) WDL   Gastrointestinal   Abdominal (WDL) X   Abdomen Inspection Rounded;Obese   Last BM (including prior to admit) 06/08/25   RUQ Bowel Sounds Active   LUQ Bowel Sounds Active   RLQ Bowel Sounds Active   LLQ Bowel Sounds Active   Genitourinary   Genitourinary (WDL) WDL   Peripheral Vascular   Peripheral Vascular (WDL) X   RLE Edema Trace   LLE Edema Pitting;+1   Skin Integumentary    Skin Color Dusky;Pale   Skin Condition/Temp Dry;Warm   Skin Integrity Abrasion   Location LUE   Skin Integumentary (WDL) X   Skin Integrity Site 2   Skin Integrity Location 2 Wound (see LDA)   Location 2 left hip   Preventative Dressing Yes   Assessed this shift Yes   Musculoskeletal   RL Extremity Weakness;Unsteady   LL Extremity Weakness;Unsteady;Surgery   Musculoskeletal (WDL) X   Musculoskeletal Details   L Hip Surgery   Care Plan - Discharge Planning Goals   Discharge to home or other facility with appropriate resources Identify barriers to discharge with patient and caregiver;Identify discharge learning needs (meds, wound care, etc)

## 2025-06-09 NOTE — CARE COORDINATION
Lives with SO.  Has SC, cane, rollator, RW.  DME per therapy recs.  May need HH at DC. CM to monitor for DC needs and progress.      06/09/25 1157   Service Assessment   Patient Orientation Alert and Oriented   Cognition Alert   History Provided By Patient;Medical Record   Primary Caregiver Self   Support Systems Spouse/Significant Other;Children;Family Members   Patient's Healthcare Decision Maker is: Legal Next of Kin   PCP Verified by CM Yes   Last Visit to PCP Within last 3 months   Prior Functional Level Independent in ADLs/IADLs   Current Functional Level Assistance with the following:  (per therapy eval)   Can patient return to prior living arrangement Yes   Ability to make needs known: Good   Family able to assist with home care needs: Yes   Would you like for me to discuss the discharge plan with any other family members/significant others, and if so, who? Yes  (SO)   Financial Resources Medicare;[place]   Community Resources None   CM/ Referral DME   Social/Functional History   Lives With Spouse   Type of Home House   Home Layout Two level;Able to Live on Main level with bedroom/bathroom   Home Access Stairs to enter without rails   Entrance Stairs - Number of Steps 1   Bathroom Shower/Tub Tub/Shower unit;Shower chair with back   Bathroom Toilet Handicap height   Bathroom Equipment Shower chair   Bathroom Accessibility Accessible   Home Equipment Cane;Walker - Rolling;Rollator   Receives Help From Family  (wife and daughter lives close by if needed)   Prior Level of Assist for ADLs Independent   Prior Level of Assist for Homemaking Independent   Homemaking Responsibilities No  (wife completes)   Prior Level of Assist for Transfers Independent   Active  Yes   Discharge Planning   Type of Residence House   Living Arrangements Spouse/Significant Other   Current Services Prior To Admission Durable Medical Equipment   Current DME Prior to Arrival Shower Chair;Cane;Walker  (SC, cane, rollator, RW)

## 2025-06-09 NOTE — PLAN OF CARE
Problem: Discharge Planning  Goal: Discharge to home or other facility with appropriate resources  Outcome: Progressing     Problem: Safety - Adult  Goal: Free from fall injury  6/8/2025 2215 by Edyta Medina, RN  Outcome: Progressing  6/8/2025 0956 by Nazanin Murphy, RN  Outcome: Progressing     Problem: Skin/Tissue Integrity  Goal: Skin integrity remains intact  Description: 1.  Monitor for areas of redness and/or skin breakdown2.  Assess vascular access sites hourly3.  Every 4-6 hours minimum:  Change oxygen saturation probe site4.  Every 4-6 hours:  If on nasal continuous positive airway pressure, respiratory therapy assess nares and determine need for appliance change or resting period  Outcome: Progressing     Problem: ABCDS Injury Assessment  Goal: Absence of physical injury  Outcome: Progressing

## 2025-06-10 PROCEDURE — 6360000002 HC RX W HCPCS: Performed by: PHYSICIAN ASSISTANT

## 2025-06-10 PROCEDURE — 97530 THERAPEUTIC ACTIVITIES: CPT

## 2025-06-10 PROCEDURE — 94761 N-INVAS EAR/PLS OXIMETRY MLT: CPT

## 2025-06-10 PROCEDURE — 1200000002 HC SEMI PRIVATE SWING BED

## 2025-06-10 PROCEDURE — 6370000000 HC RX 637 (ALT 250 FOR IP): Performed by: PHYSICIAN ASSISTANT

## 2025-06-10 PROCEDURE — 97110 THERAPEUTIC EXERCISES: CPT

## 2025-06-10 RX ORDER — ENOXAPARIN SODIUM 100 MG/ML
30 INJECTION SUBCUTANEOUS DAILY
Status: DISCONTINUED | OUTPATIENT
Start: 2025-06-10 | End: 2025-06-12

## 2025-06-10 RX ADMIN — CILOSTAZOL 50 MG: 100 TABLET ORAL at 08:26

## 2025-06-10 RX ADMIN — GABAPENTIN 300 MG: 300 CAPSULE ORAL at 20:59

## 2025-06-10 RX ADMIN — Medication 1 CAPSULE: at 08:27

## 2025-06-10 RX ADMIN — GABAPENTIN 300 MG: 300 CAPSULE ORAL at 08:31

## 2025-06-10 RX ADMIN — OXYCODONE 5 MG: 5 TABLET ORAL at 10:10

## 2025-06-10 RX ADMIN — Medication 200 MG: at 13:42

## 2025-06-10 RX ADMIN — PREDNISONE 20 MG: 20 TABLET ORAL at 08:26

## 2025-06-10 RX ADMIN — FUROSEMIDE 40 MG: 40 TABLET ORAL at 08:26

## 2025-06-10 RX ADMIN — SENNOSIDES 17.2 MG: 8.6 TABLET, FILM COATED ORAL at 08:26

## 2025-06-10 RX ADMIN — CARBOXYMETHYLCELLULOSE SODIUM 1 DROP: 0.5 SOLUTION/ DROPS OPHTHALMIC at 08:27

## 2025-06-10 RX ADMIN — HEPARIN SODIUM 5000 UNITS: 5000 INJECTION INTRAVENOUS; SUBCUTANEOUS at 06:55

## 2025-06-10 RX ADMIN — Medication 10 MG: at 21:52

## 2025-06-10 RX ADMIN — PANTOPRAZOLE SODIUM 40 MG: 40 TABLET, DELAYED RELEASE ORAL at 06:55

## 2025-06-10 RX ADMIN — CARVEDILOL 12.5 MG: 12.5 TABLET, FILM COATED ORAL at 06:55

## 2025-06-10 RX ADMIN — CYANOCOBALAMIN TAB 500 MCG 1000 MCG: 500 TAB at 08:26

## 2025-06-10 RX ADMIN — CARBOXYMETHYLCELLULOSE SODIUM 1 DROP: 0.5 SOLUTION/ DROPS OPHTHALMIC at 16:49

## 2025-06-10 RX ADMIN — ROSUVASTATIN CALCIUM 10 MG: 10 TABLET, FILM COATED ORAL at 16:49

## 2025-06-10 RX ADMIN — CARBOXYMETHYLCELLULOSE SODIUM 1 DROP: 0.5 SOLUTION/ DROPS OPHTHALMIC at 20:59

## 2025-06-10 RX ADMIN — CARVEDILOL 12.5 MG: 12.5 TABLET, FILM COATED ORAL at 16:49

## 2025-06-10 RX ADMIN — FOLIC ACID 1 MG: 1 TABLET ORAL at 08:27

## 2025-06-10 RX ADMIN — CILOSTAZOL 50 MG: 100 TABLET ORAL at 20:59

## 2025-06-10 RX ADMIN — ENOXAPARIN SODIUM 30 MG: 100 INJECTION SUBCUTANEOUS at 13:42

## 2025-06-10 RX ADMIN — ASPIRIN 81 MG: 81 TABLET, COATED ORAL at 08:26

## 2025-06-10 RX ADMIN — CARBOXYMETHYLCELLULOSE SODIUM 1 DROP: 0.5 SOLUTION/ DROPS OPHTHALMIC at 12:40

## 2025-06-10 RX ADMIN — OXYCODONE 5 MG: 5 TABLET ORAL at 16:53

## 2025-06-10 ASSESSMENT — PAIN SCALES - GENERAL
PAINLEVEL_OUTOF10: 6
PAINLEVEL_OUTOF10: 7
PAINLEVEL_OUTOF10: 5
PAINLEVEL_OUTOF10: 4

## 2025-06-10 ASSESSMENT — PAIN DESCRIPTION - LOCATION
LOCATION: LEG

## 2025-06-10 NOTE — PLAN OF CARE
Problem: Discharge Planning  Goal: Discharge to home or other facility with appropriate resources  6/10/2025 0916 by Shannan Lincoln RN  Outcome: Progressing  6/9/2025 2212 by Ricky Zafar RN  Outcome: Progressing     Problem: Safety - Adult  Goal: Free from fall injury  6/10/2025 0916 by Shannan Lincoln RN  Outcome: Progressing  6/9/2025 2212 by Ricky Zafar RN  Outcome: Progressing     Problem: Skin/Tissue Integrity  Goal: Skin integrity remains intact  Description: 1.  Monitor for areas of redness and/or skin breakdown2.  Assess vascular access sites hourly3.  Every 4-6 hours minimum:  Change oxygen saturation probe site4.  Every 4-6 hours:  If on nasal continuous positive airway pressure, respiratory therapy assess nares and determine need for appliance change or resting period  6/10/2025 0916 by Shannan Lincoln RN  Outcome: Progressing  6/9/2025 2212 by Ricky Zafar RN  Outcome: Progressing     Problem: ABCDS Injury Assessment  Goal: Absence of physical injury  6/10/2025 0916 by Shannan Lincoln RN  Outcome: Progressing  6/9/2025 2212 by Ricky Zafar RN  Outcome: Progressing     Problem: Nutrition Deficit:  Goal: Optimize nutritional status  6/10/2025 0916 by Shannan Lincoln RN  Outcome: Progressing  6/9/2025 2212 by Ricky Zafar RN  Outcome: Progressing     Problem: Pain  Goal: Verbalizes/displays adequate comfort level or baseline comfort level  Outcome: Progressing

## 2025-06-10 NOTE — ACP (ADVANCE CARE PLANNING)
Advance Care Planning     General Advance Care Planning (ACP) Conversation    Date of Conversation: 6/10/2025  Conducted with: Patient with Decision Making Capacity  Other persons present: None    Healthcare Decision Maker:   Primary Decision Maker: Sabiha Palma - Spouse - [phone]     Today we documented Decision Maker(s) consistent with Legal Next of Kin hierarchy.  Content/Action Overview:  Has NO ACP documents-Information provided  Reviewed DNR/DNI and patient elects Full Code (Attempt Resuscitation)  artificial nutrition, ventilation preferences, and resuscitation preferences      Length of Voluntary ACP Conversation in minutes:  <16 minutes (Non-Billable)    Willy Wallace

## 2025-06-10 NOTE — PROGRESS NOTES
Physical Therapy  Facility/Department: [facility] MED SURG  Daily Treatment Note  NAME: Jose Palma  : 1945  MRN: [id number]    Date of Service: 6/10/2025    Discharge Recommendations:  Continue to assess pending progress, Home with assist PRN, Therapy recommended at discharge      Patient Diagnosis(es): There were no encounter diagnoses.    Assessment  Assessment: Patient reports L hip pain but states he declined pain meds this morning. Agreeable to take now and RN administered. Cotreated with OT. Patient transitioned supine to sit with CGA/Min A and extra time. Able to scoot with SBA. Engaged patient in B LE therex in sitting. Patient used his hands to assist L LE with some of the exercises. Patient stood 4 times from elevated bed surface with verbal cues and Mod A. Patient fearful of falling and has difficulty with achieving full upright posture and with weight shifting. Transferred patient bed to recliner via Yamini Steady.  Activity Tolerance: Patient limited by pain;Patient tolerated treatment well    Plan  Physical Therapy Plan  General Plan: 3-5 times per week  Days Per Week: 5 Days  Therapy Duration: 2 Weeks  Current Treatment Recommendations: Strengthening;ROM;Balance training;Functional mobility training;Transfer training;Endurance training;Pain management;Stair training;Gait training;Home exercise program;Safety education & training;Patient/Caregiver education & training;Equipment evaluation, education, & procurement;Therapeutic activities    Restrictions  Restrictions/Precautions  Restrictions/Precautions: General Precautions  Required Braces or Orthoses?: No     Subjective   Subjective  Subjective: Patient presents awake in bed. States he's ok but it's been one of his sad days. \"I get to thinking about things and it makes me sad.\"    Objective  Bed Mobility Training  Bed Mobility Training: Yes  Overall Level of Assistance: Minimal assistance;Contact guard assistance  Interventions: Verbal

## 2025-06-10 NOTE — FLOWSHEET NOTE
06/09/25 2208   Assessment   Charting Type Shift assessment   Psychosocial   Psychosocial (WDL) WDL   Neurological   Neuro (WDL) WDL   Kim Coma Scale   Eye Opening 4   Best Verbal Response 5   Best Motor Response 6   [place] Coma Scale Score 15   HEENT (Head, Ears, Eyes, Nose, & Throat)   HEENT (WDL) X   Right Eye Glasses   Left Eye Glasses   Teeth Dentures upper;Dentures lower   Respiratory   Respiratory (WDL) WDL   Cardiac   Cardiac (WDL) WDL   Gastrointestinal   Abdominal (WDL) X   Abdomen Inspection Rounded;Obese   Last BM (including prior to admit) 06/08/25   RUQ Bowel Sounds Active   LUQ Bowel Sounds Active   RLQ Bowel Sounds Active   LLQ Bowel Sounds Active   Genitourinary   Genitourinary (WDL) WDL   Peripheral Vascular   Peripheral Vascular (WDL) X   RLE Edema Trace   LLE Edema Pitting;+1   Skin Integumentary    Skin Color Dusky   Skin Condition/Temp Warm;Dry   Skin Integrity Abrasion   Location LUE   Skin Integumentary (WDL) X   Skin Integrity Site 2   Skin Integrity Location 2 Wound (see LDA)   Location 2 left hip   Preventative Dressing Yes   Musculoskeletal   RL Extremity Weakness;Unsteady   LL Extremity Weakness;Unsteady;Surgery   Musculoskeletal (WDL) X   Musculoskeletal Details   L Hip Surgery

## 2025-06-10 NOTE — PLAN OF CARE
Problem: Discharge Planning  Goal: Discharge to home or other facility with appropriate resources  6/9/2025 2212 by Ricky Zafar RN  Outcome: Progressing  6/9/2025 1017 by Cassie Jones RN  Outcome: Progressing  Flowsheets (Taken 6/9/2025 0914)  Discharge to home or other facility with appropriate resources:   Identify barriers to discharge with patient and caregiver   Identify discharge learning needs (meds, wound care, etc)     Problem: Safety - Adult  Goal: Free from fall injury  6/9/2025 2212 by Ricky Zafar RN  Outcome: Progressing  6/9/2025 1017 by Cassie Jones RN  Outcome: Progressing     Problem: Skin/Tissue Integrity  Goal: Skin integrity remains intact  Description: 1.  Monitor for areas of redness and/or skin breakdown2.  Assess vascular access sites hourly3.  Every 4-6 hours minimum:  Change oxygen saturation probe site4.  Every 4-6 hours:  If on nasal continuous positive airway pressure, respiratory therapy assess nares and determine need for appliance change or resting period  6/9/2025 2212 by Ricky Zafar RN  Outcome: Progressing  6/9/2025 1017 by Cassie Jones RN  Outcome: Progressing  Flowsheets (Taken 6/9/2025 0914)  Skin Integrity Remains Intact: Monitor for areas of redness and/or skin breakdown     Problem: ABCDS Injury Assessment  Goal: Absence of physical injury  6/9/2025 2212 by Ricky Zafar RN  Outcome: Progressing  6/9/2025 1017 by Cassie Jones RN  Outcome: Progressing     Problem: Nutrition Deficit:  Goal: Optimize nutritional status  Outcome: Progressing

## 2025-06-10 NOTE — PROGRESS NOTES
Occupational Therapy  Facility/Department: [facility] MED SURG  Daily Treatment Note  NAME: Jose Palma  : 1945  MRN: [id number]    Date of Service: 6/10/2025    Discharge Recommendations:  Continue to assess pending progress     Patient Diagnosis(es): There were no encounter diagnoses.     Assessment   Assessment: Pt received in bed on this date. Co tx with PTA. Pt agreeable to therapy services. Pt reports he is having pain in L hip but declined pain medication prior to beginning therapy session. Pt agreeable to pain medication after discussion with limiting progress. Nurse provided pain medication during treatment. Pt transferred to sitting at EOB with increased timing to come to sitting position patient required CGA<>MIN A to complete task. Once seated at EOB patient able to scoot forward with SBA. Bed elevated to assist with standing transfers. Pt come to stand with MOD A and max verbal cuing for proper hand placement and body mechanics. Pt come to stand x4 trials. Pt with increased fatigue. Pt completed weight shifting technique with education on offloading weight from left hip using B hands. Pt having difficulty tolerating weight shifting and unable to complete pivot or side stepping. Pt transferred to sitting. Pt transferred to recliner with kamlesh steady and positioned for comfort. Pt provided with ice pack for pain relief as well. Pt participated in BUE ther ex using orange theraband x15 reps with fatigue noted after completing task. Pt with call light in reach and left sitting upright in recliner upon exit.  Activity Tolerance: Patient limited by pain;Patient tolerated treatment well    Subjective  Subjective  Subjective: I am down today. I just didn't do very well yesterday and it makes me sad.  Pain: Pain in L hip     Objective  Vitals     Bed Mobility Training  Bed Mobility Training: Yes  Overall Level of Assistance: Minimal assistance;Contact guard assistance  Interventions: Verbal  cues  Rolling: Minimal assistance;Contact guard assistance  Supine to Sit: Minimal assistance;Contact guard assistance  Scooting: Contact guard assistance  Balance  Sitting: Intact  Standing: Impaired;With support  Transfer Training  Transfer Training: Yes  Overall Level of Assistance: Partial/Moderate assistance  Sit to Stand: Partial/Moderate assistance  Stand to Sit: Minimal assistance  Bed to Chair: 2 Person assistance (via Yamini Steady)     OT Exercises  Resistive Exercises: Orange theraband shoulder flexion, horizontal ABD, elbow flexion, extension x15 reps.     Safety Devices  Type of Devices: Left in chair;Call light within reach    Goals  Short Term Goals  Short Term Goal 1: Pt will be MOD I with fx mobility tasks  Short Term Goal 2: Pt will be MOD I with fx transfers  Short Term Goal 3: Pt will be MOD I with dressing tasks  Short Term Goal 4: Pt will be MOD I with toileting tasks  Short Term Goal 5: Pt will be MOD I with bathing tasks      Therapy Time   Individual Concurrent Group Co-treatment   Time In 1003         Time Out 1105         Minutes 62          This note serves as a DC summary in the event of pt discharge.     Keila Mendoza OTR/L

## 2025-06-10 NOTE — FLOWSHEET NOTE
06/10/25 0830   Assessment   Charting Type Shift assessment   Psychosocial   Psychosocial (WDL) WDL   Neurological   Neuro (WDL) WDL   Kim Coma Scale   Eye Opening 4   Best Verbal Response 5   Best Motor Response 6   [place] Coma Scale Score 15   HEENT (Head, Ears, Eyes, Nose, & Throat)   HEENT (WDL) X   Right Eye Glasses   Left Eye Glasses   Teeth Dentures upper;Dentures lower   Respiratory   Respiratory (WDL) WDL   Cardiac   Cardiac (WDL) WDL   Gastrointestinal   Abdominal (WDL) X   Abdomen Inspection Rounded;Obese   Last BM (including prior to admit) 06/08/25   RUQ Bowel Sounds Active   LUQ Bowel Sounds Active   RLQ Bowel Sounds Active   LLQ Bowel Sounds Active   Tenderness No guarding   Genitourinary   Genitourinary (WDL) WDL   Peripheral Vascular   Peripheral Vascular (WDL) X   RLE Edema Trace   LLE Edema Pitting;+1   Skin Integumentary    Skin Condition/Temp Warm;Dry   Skin Integrity Abrasion   Location LUE   Multiple Skin Integrity Sites Yes   Skin Integumentary (WDL) X   Skin Integrity Site 2   Skin Integrity Location 2 Wound (see LDA)   Location 2 left hip   Preventative Dressing Yes   Musculoskeletal   RL Extremity Weakness;Unsteady   LL Extremity Weakness;Unsteady;Surgery   Musculoskeletal (WDL) X   Musculoskeletal Details   L Hip Surgery     Pt is alert and oriented.  Pt is awake and siting up in bed and appears to have no acute distress. Pt currently on room air. Pt's lung sounds are clear. Pt encouraged to cough and deep breathe. Pt call bell and bedside table within reach.

## 2025-06-11 PROCEDURE — 99309 SBSQ NF CARE MODERATE MDM 30: CPT | Performed by: INTERNAL MEDICINE

## 2025-06-11 PROCEDURE — 97530 THERAPEUTIC ACTIVITIES: CPT | Performed by: OCCUPATIONAL THERAPIST

## 2025-06-11 PROCEDURE — 1200000002 HC SEMI PRIVATE SWING BED

## 2025-06-11 PROCEDURE — 6370000000 HC RX 637 (ALT 250 FOR IP): Performed by: PHYSICIAN ASSISTANT

## 2025-06-11 PROCEDURE — 6360000002 HC RX W HCPCS: Performed by: PHYSICIAN ASSISTANT

## 2025-06-11 PROCEDURE — 94761 N-INVAS EAR/PLS OXIMETRY MLT: CPT

## 2025-06-11 PROCEDURE — 97530 THERAPEUTIC ACTIVITIES: CPT

## 2025-06-11 PROCEDURE — 97110 THERAPEUTIC EXERCISES: CPT

## 2025-06-11 PROCEDURE — 97110 THERAPEUTIC EXERCISES: CPT | Performed by: OCCUPATIONAL THERAPIST

## 2025-06-11 RX ADMIN — OXYCODONE 5 MG: 5 TABLET ORAL at 21:13

## 2025-06-11 RX ADMIN — CILOSTAZOL 50 MG: 100 TABLET ORAL at 21:12

## 2025-06-11 RX ADMIN — GABAPENTIN 300 MG: 300 CAPSULE ORAL at 08:15

## 2025-06-11 RX ADMIN — CARBOXYMETHYLCELLULOSE SODIUM 1 DROP: 0.5 SOLUTION/ DROPS OPHTHALMIC at 21:12

## 2025-06-11 RX ADMIN — Medication 1 CAPSULE: at 08:17

## 2025-06-11 RX ADMIN — PREDNISONE 20 MG: 20 TABLET ORAL at 08:15

## 2025-06-11 RX ADMIN — CARVEDILOL 12.5 MG: 12.5 TABLET, FILM COATED ORAL at 05:39

## 2025-06-11 RX ADMIN — CARVEDILOL 12.5 MG: 12.5 TABLET, FILM COATED ORAL at 17:23

## 2025-06-11 RX ADMIN — PANTOPRAZOLE SODIUM 40 MG: 40 TABLET, DELAYED RELEASE ORAL at 05:39

## 2025-06-11 RX ADMIN — FUROSEMIDE 40 MG: 40 TABLET ORAL at 08:15

## 2025-06-11 RX ADMIN — GABAPENTIN 300 MG: 300 CAPSULE ORAL at 21:13

## 2025-06-11 RX ADMIN — Medication 200 MG: at 13:48

## 2025-06-11 RX ADMIN — Medication 10 MG: at 21:13

## 2025-06-11 RX ADMIN — ENOXAPARIN SODIUM 30 MG: 100 INJECTION SUBCUTANEOUS at 08:19

## 2025-06-11 RX ADMIN — CARBOXYMETHYLCELLULOSE SODIUM 1 DROP: 0.5 SOLUTION/ DROPS OPHTHALMIC at 17:23

## 2025-06-11 RX ADMIN — ROSUVASTATIN CALCIUM 10 MG: 10 TABLET, FILM COATED ORAL at 17:23

## 2025-06-11 RX ADMIN — SIMETHICONE 80 MG: 80 TABLET, CHEWABLE ORAL at 08:16

## 2025-06-11 RX ADMIN — SENNOSIDES 17.2 MG: 8.6 TABLET, FILM COATED ORAL at 08:17

## 2025-06-11 RX ADMIN — CYANOCOBALAMIN TAB 500 MCG 1000 MCG: 500 TAB at 08:16

## 2025-06-11 RX ADMIN — ASPIRIN 81 MG: 81 TABLET, COATED ORAL at 08:17

## 2025-06-11 RX ADMIN — CARBOXYMETHYLCELLULOSE SODIUM 1 DROP: 0.5 SOLUTION/ DROPS OPHTHALMIC at 08:19

## 2025-06-11 RX ADMIN — CARBOXYMETHYLCELLULOSE SODIUM 1 DROP: 0.5 SOLUTION/ DROPS OPHTHALMIC at 13:49

## 2025-06-11 RX ADMIN — OXYCODONE 5 MG: 5 TABLET ORAL at 05:41

## 2025-06-11 RX ADMIN — CILOSTAZOL 50 MG: 100 TABLET ORAL at 08:14

## 2025-06-11 RX ADMIN — FOLIC ACID 1 MG: 1 TABLET ORAL at 08:15

## 2025-06-11 ASSESSMENT — PAIN SCALES - GENERAL
PAINLEVEL_OUTOF10: 7
PAINLEVEL_OUTOF10: 8
PAINLEVEL_OUTOF10: 2

## 2025-06-11 ASSESSMENT — PAIN DESCRIPTION - LOCATION
LOCATION: HIP;LEG
LOCATION: HIP;LEG

## 2025-06-11 NOTE — CARE COORDINATION
Swing Bed Interdisciplinary Care Plan Conference Report    Patients name:Jose Palma  Date of Conference: 6/11/2025    Interdisciplinary rounding completed.  Activities reviewed with OT.  The Following Information was discussed and agreed upon with the patient and/or Caregivers as listed below.      Members in Attendance:   : RAFA Mayes    Therapy: JENNI Mandel; VALORIE Pedraza  Dietician: Delmy   Provider: ORLIN Moseley     Nutrition:  Current Body Weight:   Wt Readings from Last 1 Encounters:   06/11/25 106.7 kg (235 lb 3 oz)     Current Diet order:ADULT DIET; Regular  ADULT ORAL NUTRITION SUPPLEMENT; Lunch, Dinner; Wound Healing Oral Supplement  ADULT ORAL NUTRITION SUPPLEMENT; Breakfast, Lunch; Standard High Calorie/High Protein Oral Supplement  Intakes: Avg intakes of 40-64% x 9 meals.   Note: Folic acid, vitamin D, venofer, lactobacillus, B12, Lasix ordered. Cornelius and Ensure Plus ordered BID. Trace edema BLE.   Goal: PO >75% meals and supplements    Physical/Occupational Therapy:  Current ADL Status: ADL  LE Dressing: Maximum assistance, Based on clinical judgement  Putting On/Taking Off Footwear: Maximum assistance, Based on clinical judgement  Toileting: Contact guard assistance, Stand by assistance  Toileting Skilled Clinical Factors: for use of urinal  Product Used : Incontinent cleanser, Moisture barrier, Soap and water  Transfers:   Transfers  Sit to Stand: Partial/Moderate assistance, 2 Person Assistance  Stand to Sit: Minimal Assistance  Bed to Chair:  (attempted, but unable to complete with stand pivot.  Used Yamini steady for transfer to recliner)  Mobility/Ambulation:   Ambulation  Comments: Attempted to take steps but pt was unable to advance LLE due to pain.  Pt had not had pain medication today.  Pt was able to come to stand x 2 trials and stand for 1-2 minutes each time.      Short Term Goals  Short Term Goal 1: Pt will be MOD I with fx mobility tasks  Short Term Goal 2: Pt will be MOD I  with fx transfers  Short Term Goal 3: Pt will be MOD I with dressing tasks  Short Term Goal 4: Pt will be MOD I with toileting tasks  Short Term Goal 5: Pt will be MOD I with bathing tasks    Short Term Goals:  Time Frame for Short Term Goals: 1 week  Short Term Goal 1: Pt to move supine to sit x SBA  Short Term Goal 2: Pt to transfer sit to stand x CGA with RW.  Short Term Goal 3: Pt to ambulate 100 feet with RW  Short Term Goal 4: Pt to transfer bed to chair x Min A with RW       Nursing:  Skin/Wound/Incisions:  Retired, Read Only Skin Color/Condition  Skin Color: Pale  Skin Condition/Temp: Warm, Dry  Skin Integumentary   Skin Color: Pale  Skin Condition/Temp: Warm, Dry  Skin Integrity: Abrasion  Location: LUE  Multiple Skin Integrity Sites: Yes  Skin Integrity Site 2  Skin Integrity Location 2: Wound (see LDA)  Location 2: LHIP  Preventative Dressing: Yes  Date Applied: 06/11/25  Assessed this shift: Yes       Pharmacy:  Pain Control: Patient has oxycodone 5mg po q6h prn moderate pain and APAP 650mg po q4h prn mild pain available to patient upon request.  Antibiotics:  No current antibiotics.    Anticoagulants: Enoxaparin 30mg SC daily for VTE prophylaxis based on weight and estimated CrCl=28mL/min.    Note:  No medication-related concerns.      Goals:   Pt will remain free from falls  Pt will have control of acute pain    Activities:   Goal: Participate in 3 activities per week    DC Planning:  Plan for Discharge: home with family assist  Follow Up/Services needed: ortho tomorrow - EMS to transport    Continued skilled needs:  Skilled Services are for the ongoing condition for which the individual received inpatient care in a hospital.

## 2025-06-11 NOTE — PLAN OF CARE
Problem: Discharge Planning  Goal: Discharge to home or other facility with appropriate resources  6/11/2025 0932 by Shannan Lincoln RN  Outcome: Progressing  6/10/2025 2333 by Ricky Zafar RN  Outcome: Progressing     Problem: Safety - Adult  Goal: Free from fall injury  6/11/2025 0932 by Shannan Lincoln RN  Outcome: Progressing  6/10/2025 2333 by Ricky Zafar RN  Outcome: Progressing     Problem: Skin/Tissue Integrity  Goal: Skin integrity remains intact  Description: 1.  Monitor for areas of redness and/or skin breakdown2.  Assess vascular access sites hourly3.  Every 4-6 hours minimum:  Change oxygen saturation probe site4.  Every 4-6 hours:  If on nasal continuous positive airway pressure, respiratory therapy assess nares and determine need for appliance change or resting period  6/11/2025 0932 by Shannan Lincoln RN  Outcome: Progressing  6/10/2025 2333 by Ricky Zafar RN  Outcome: Progressing     Problem: ABCDS Injury Assessment  Goal: Absence of physical injury  6/11/2025 0932 by Shannan Lincoln RN  Outcome: Progressing  6/10/2025 2333 by Ricky Zafar RN  Outcome: Progressing     Problem: Nutrition Deficit:  Goal: Optimize nutritional status  6/11/2025 0932 by Shannan Lincoln RN  Outcome: Progressing  6/10/2025 2333 by Ricky Zafar RN  Outcome: Progressing     Problem: Pain  Goal: Verbalizes/displays adequate comfort level or baseline comfort level  6/11/2025 0932 by Shannan Lincoln RN  Outcome: Progressing  6/10/2025 2333 by Ricky Zafar RN  Outcome: Progressing

## 2025-06-11 NOTE — PROGRESS NOTES
Physical Therapy  Facility/Department: [facility] MED SURG  Daily Treatment Note  NAME: Jose Palma  : 1945  MRN: [id number]    Date of Service: 2025    Discharge Recommendations:  Continue to assess pending progress, Home with assist PRN, Therapy recommended at discharge      Patient Diagnosis(es): There were no encounter diagnoses.    Assessment  Assessment: Patient awake in bed with visitor present. States he still doesn't feel very well today but knows he needs to work. Reports taking his pain meds this morning. Cotreated with OT. Patient transitioned supine to sit with extra time and CGA/Min A. Worked on sit to stands from elevated bedside up to RW 4 trials with Mod A of 2. Patient continues to stand with flexed head and trunk and requires cueing to straighten up as much as able without causing back pain and for proper hand placement on the RW. Patient is also fearful of falling.Tolerates 5-10 seconds of standing at a time. Transferred patient from bed to recliner via Yamini Steady. Patient also had difficulty pulling up to stand at Yamini Steady. Patient completed chair pushups 3, 3, and 1. Patient attempted standing from the recliner but was unable to complete. After a brief rest, he stood with Mod A of 2. Completed B LE therex in sitting. Instructed patient to continue working on exercises and strengthening when therapy is not present.  Activity Tolerance: Patient limited by pain;Patient tolerated treatment well    Plan  Physical Therapy Plan  General Plan: 3-5 times per week  Days Per Week: 5 Days  Therapy Duration: 2 Weeks  Current Treatment Recommendations: Strengthening;ROM;Balance training;Functional mobility training;Transfer training;Endurance training;Pain management;Stair training;Gait training;Home exercise program;Safety education & training;Patient/Caregiver education & training;Equipment evaluation, education, & procurement;Therapeutic

## 2025-06-11 NOTE — FLOWSHEET NOTE
06/10/25 2200   Assessment   Charting Type Shift assessment   Psychosocial   Psychosocial (WDL) WDL   Neurological   Neuro (WDL) WDL   Kim Coma Scale   Eye Opening 4   Best Verbal Response 5   Best Motor Response 6   [place] Coma Scale Score 15   HEENT (Head, Ears, Eyes, Nose, & Throat)   HEENT (WDL) X   Right Eye Glasses   Left Eye Glasses   Teeth Dentures upper;Dentures lower   Respiratory   Respiratory (WDL) WDL   Cardiac   Cardiac (WDL) WDL   Gastrointestinal   Abdominal (WDL) X   Abdomen Inspection Rounded;Obese   Last BM (including prior to admit) 06/08/25   RUQ Bowel Sounds Active   LUQ Bowel Sounds Active   RLQ Bowel Sounds Active   LLQ Bowel Sounds Active   Tenderness No guarding   Genitourinary   Genitourinary (WDL) WDL   Peripheral Vascular   Peripheral Vascular (WDL) X   RLE Edema Trace   LLE Edema Pitting;+1   Skin Integumentary    Skin Color Pale   Skin Condition/Temp Warm;Dry   Skin Integrity Abrasion   Location LUE   Skin Integumentary (WDL) X   Skin Integrity Site 2   Skin Integrity Location 2 Wound (see LDA)   Location 2 left hip   Preventative Dressing Yes   Date Applied 06/10/25   Musculoskeletal   RL Extremity Weakness;Unsteady   LL Extremity Weakness;Unsteady;Surgery   Musculoskeletal (WDL) X   Musculoskeletal Details   L Hip Surgery

## 2025-06-11 NOTE — PLAN OF CARE
Problem: Discharge Planning  Goal: Discharge to home or other facility with appropriate resources  Outcome: Progressing     Problem: Safety - Adult  Goal: Free from fall injury  Outcome: Progressing     Problem: Skin/Tissue Integrity  Goal: Skin integrity remains intact  Description: 1.  Monitor for areas of redness and/or skin breakdown2.  Assess vascular access sites hourly3.  Every 4-6 hours minimum:  Change oxygen saturation probe site4.  Every 4-6 hours:  If on nasal continuous positive airway pressure, respiratory therapy assess nares and determine need for appliance change or resting period  Outcome: Progressing     Problem: ABCDS Injury Assessment  Goal: Absence of physical injury  Outcome: Progressing     Problem: Nutrition Deficit:  Goal: Optimize nutritional status  Outcome: Progressing     Problem: Pain  Goal: Verbalizes/displays adequate comfort level or baseline comfort level  Outcome: Progressing

## 2025-06-11 NOTE — PROGRESS NOTES
Progress Note      Patient:  Jose Palma  :  1945    Subjective:   Chief complaint: Declining functional status     Interval History:   No acute events. Sitting on edge of bed about to work with therapy. Tolerating oral intake. Still baseline weakness and cooperating with PT/OT as tolerated. [facility] Still some edema. No worsening pain and tolerating regimen. Having UOP, BM.     Review of systems:     Constitutional:  Denies fever or chills.   Eyes:  Denies change in visual acuity or discharge.  HENT:  Denies nasal congestion or sore throat.  Respiratory:  Denies cough or shortness of breath.   Cardiovascular:  Denies chest pain, palpitation. +LE edema   GI:  Denies abdominal pain, nausea, vomiting, bloody stools or diarrhea.   :  Denies dysuria or frequency.   Musculoskeletal: +chronic pain  Neurologic:  Denies headache, focal weakness or sensory changes. +chronic parasthesias in upper and lower extremities  Psychiatric:  Denies acute depression or anxiety.    Past medical history, surgical history, family history and social history reviewed and unchanged compared to H&P earlier this admission.    Medications:   Scheduled Meds:   enoxaparin  30 mg SubCUTAneous Daily    rosuvastatin  10 mg Oral QPM    vitamin D  50,000 Units Oral Weekly    folic acid  1 mg Oral Daily    nicotine  1 patch TransDERmal Daily    pantoprazole  40 mg Oral QAM AC    carvedilol  12.5 mg Oral Q12H    cilostazol  50 mg Oral BID    cyanocobalamin  1,000 mcg Oral Daily    furosemide  40 mg Oral Daily    gabapentin  300 mg Oral BID    rivastigmine  1 patch TransDERmal Daily    senna  2 tablet Oral Daily    carboxymethylcellulose PF  1 drop Both Eyes 4x Daily    lactobacillus  1 capsule Oral Daily with breakfast    aspirin  81 mg Oral Daily     Continuous Infusions:    Objective:     Vital Signs  Temp: 98.4 °F (36.9 °C)  Pulse: 77  Respirations: 18  BP: 126/64  SpO2: 92 %  O2 Device: None (Room air)

## 2025-06-11 NOTE — FLOWSHEET NOTE
06/11/25 0845   Assessment   Charting Type Shift assessment   Psychosocial   Psychosocial (WDL) WDL   Neurological   Neuro (WDL) WDL   Kim Coma Scale   Eye Opening 4   Best Verbal Response 5   Best Motor Response 6   [place] Coma Scale Score 15   HEENT (Head, Ears, Eyes, Nose, & Throat)   HEENT (WDL) X   Right Eye Glasses   Left Eye Glasses   Teeth Dentures upper;Dentures lower   Respiratory   Respiratory (WDL) WDL   Cardiac   Cardiac (WDL) WDL   Gastrointestinal   Abdominal (WDL) X   Abdomen Inspection Rounded;Obese   Last BM (including prior to admit) 06/10/25   RUQ Bowel Sounds Active   LUQ Bowel Sounds Active   RLQ Bowel Sounds Active   LLQ Bowel Sounds Active   Tenderness No guarding   Genitourinary   Genitourinary (WDL) WDL   Peripheral Vascular   Peripheral Vascular (WDL) X   RLE Edema Trace   LLE Edema Trace   Skin Integumentary    Skin Color Pale   Skin Condition/Temp Warm;Dry   Skin Integrity Abrasion   Location LUE   Multiple Skin Integrity Sites Yes   Skin Integumentary (WDL) X   Skin Integrity Site 2   Skin Integrity Location 2 Wound (see LDA)   Location 2 LHIP   Preventative Dressing Yes   Date Applied 06/11/25   Assessed this shift Yes   Musculoskeletal   RL Extremity Weakness;Unsteady   LL Extremity Weakness;Unsteady;Surgery   Musculoskeletal (WDL) X   Musculoskeletal Details   L Hip Surgery     Pt is alert and oriented.  Pt is resting in bed and appears to have no acute distress. Pt currently on room air.  Pt currently on telemetry monitoring showing NSR. Pt's lung sounds are clear. Rates pain at 7/10 after pain meds. Pt call bell and bedside table within reach.

## 2025-06-11 NOTE — PROGRESS NOTES
Occupational Therapy  Facility/Department: [facility] MED SURG  Daily Treatment Note  NAME: Jose Palma  : 1945  MRN: [id number]    Date of Service: 2025    Discharge Recommendations:  Continue to assess pending progress         Patient Diagnosis(es): There were no encounter diagnoses.     Assessment   Assessment: Pt in bed upon OT arrival . He reports that he had his pain meds with breakfast. Agreeable to co-tx with PTA. Pt completed bed mobility with CGA-MIN A given extra time. Pt sat on EOB unsupported. Pt stood from EOB with bed elevated and MOD A. Pt required VPs and extra time for proper hand placement on RW when standing. Pt completed sts x 4. Pt participated in weight shifting activity while standing with RW x 2 for 5=10 seconds each. Pt with forward posture during standing with difficulty standing upright. Pt required MOD A to stand using kamlesh steady this date. Pt transferred to recliner via kamlesh steady. Pt completed chair push ups for 3 sets including 3 reps, 3 reps and then 1 more for a total of 7. Pt reported fatigue and unable to complete any further chair push ups. Pt attempted to stand from recliner and initially was unable to come to a full stand and sat back down in the recliner. Pt then attempted again, after a rest break, and was able to stand with MOD A x 2. Pt issued written HEP for BUEs using orange TB and instructed to complete HEP. Pt agreeable and wife present. Pt educated on LE exercises to A with strenghtening quads for sts. Pt left in recliner with call light within reach.  Activity Tolerance: Patient limited by pain;Patient tolerated treatment well  Discharge Recommendations: Continue to assess pending progress     Plan  Occupational Therapy Plan  Times Per Week: 3-5  Times Per Day: Once a day  Days Per Week: 5 Days  Therapy Duration: 2 Weeks  Current Treatment Recommendations: Strengthening;Balance training;ROM;Functional mobility training;Endurance training;Pain

## 2025-06-12 LAB
ALBUMIN SERPL-MCNC: 3.1 G/DL (ref 3.4–4.8)
ALBUMIN/GLOB SERPL: 1 {RATIO} (ref 0.8–2)
ALP SERPL-CCNC: 102 U/L (ref 25–100)
ALT SERPL-CCNC: 10 U/L (ref 4–36)
ANION GAP SERPL CALCULATED.3IONS-SCNC: 12 MMOL/L (ref 3–16)
AST SERPL-CCNC: 15 U/L (ref 8–33)
BILIRUB SERPL-MCNC: 0.5 MG/DL (ref 0.3–1.2)
BUN SERPL-MCNC: 41 MG/DL (ref 6–20)
CALCIUM SERPL-MCNC: 9.3 MG/DL (ref 8.3–10.6)
CHLORIDE SERPL-SCNC: 101 MMOL/L (ref 98–107)
CO2 SERPL-SCNC: 26 MMOL/L (ref 20–30)
CREAT SERPL-MCNC: 2.5 MG/DL (ref 0.8–1.3)
ERYTHROCYTE [DISTWIDTH] IN BLOOD BY AUTOMATED COUNT: 13.1 % (ref 11–16)
GFR SERPLBLD CREATININE-BSD FMLA CKD-EPI: 26 ML/MIN/{1.73_M2}
GLOBULIN SER CALC-MCNC: 3.1 G/DL
GLUCOSE SERPL-MCNC: 104 MG/DL (ref 74–106)
HCT VFR BLD AUTO: 24.7 % (ref 40–54)
HGB BLD-MCNC: 7.9 G/DL (ref 13–18)
MAGNESIUM SERPL-MCNC: 2.2 MG/DL (ref 1.7–2.4)
MCH RBC QN AUTO: 29.9 PG (ref 27–32)
MCHC RBC AUTO-ENTMCNC: 32 G/DL (ref 31–35)
MCV RBC AUTO: 93.6 FL (ref 80–100)
PLATELET # BLD AUTO: 365 K/UL (ref 150–400)
PMV BLD AUTO: 9.2 FL (ref 6–10)
POTASSIUM SERPL-SCNC: 3.4 MMOL/L (ref 3.4–5.1)
PROT SERPL-MCNC: 6.2 G/DL (ref 6.4–8.3)
RBC # BLD AUTO: 2.64 M/UL (ref 4.5–6)
SODIUM SERPL-SCNC: 139 MMOL/L (ref 136–145)
WBC # BLD AUTO: 14.1 K/UL (ref 4–11)

## 2025-06-12 PROCEDURE — 6370000000 HC RX 637 (ALT 250 FOR IP): Performed by: PHYSICIAN ASSISTANT

## 2025-06-12 PROCEDURE — 6360000002 HC RX W HCPCS: Performed by: INTERNAL MEDICINE

## 2025-06-12 PROCEDURE — 97530 THERAPEUTIC ACTIVITIES: CPT

## 2025-06-12 PROCEDURE — 36415 COLL VENOUS BLD VENIPUNCTURE: CPT

## 2025-06-12 PROCEDURE — 80053 COMPREHEN METABOLIC PANEL: CPT

## 2025-06-12 PROCEDURE — 97803 MED NUTRITION INDIV SUBSEQ: CPT

## 2025-06-12 PROCEDURE — 83735 ASSAY OF MAGNESIUM: CPT

## 2025-06-12 PROCEDURE — 97110 THERAPEUTIC EXERCISES: CPT

## 2025-06-12 PROCEDURE — 1200000002 HC SEMI PRIVATE SWING BED

## 2025-06-12 PROCEDURE — 94761 N-INVAS EAR/PLS OXIMETRY MLT: CPT

## 2025-06-12 PROCEDURE — 6360000002 HC RX W HCPCS: Performed by: PHYSICIAN ASSISTANT

## 2025-06-12 PROCEDURE — 85027 COMPLETE CBC AUTOMATED: CPT

## 2025-06-12 PROCEDURE — 97535 SELF CARE MNGMENT TRAINING: CPT

## 2025-06-12 RX ORDER — ENOXAPARIN SODIUM 100 MG/ML
30 INJECTION SUBCUTANEOUS 2 TIMES DAILY
Status: DISCONTINUED | OUTPATIENT
Start: 2025-06-12 | End: 2025-06-21 | Stop reason: HOSPADM

## 2025-06-12 RX ADMIN — OXYCODONE 5 MG: 5 TABLET ORAL at 10:10

## 2025-06-12 RX ADMIN — CARVEDILOL 12.5 MG: 12.5 TABLET, FILM COATED ORAL at 08:32

## 2025-06-12 RX ADMIN — CILOSTAZOL 50 MG: 100 TABLET ORAL at 20:31

## 2025-06-12 RX ADMIN — CILOSTAZOL 50 MG: 100 TABLET ORAL at 08:28

## 2025-06-12 RX ADMIN — CARBOXYMETHYLCELLULOSE SODIUM 1 DROP: 0.5 SOLUTION/ DROPS OPHTHALMIC at 20:34

## 2025-06-12 RX ADMIN — PANTOPRAZOLE SODIUM 40 MG: 40 TABLET, DELAYED RELEASE ORAL at 08:29

## 2025-06-12 RX ADMIN — ASPIRIN 81 MG: 81 TABLET, COATED ORAL at 08:28

## 2025-06-12 RX ADMIN — FUROSEMIDE 40 MG: 40 TABLET ORAL at 08:29

## 2025-06-12 RX ADMIN — ROSUVASTATIN CALCIUM 10 MG: 10 TABLET, FILM COATED ORAL at 17:45

## 2025-06-12 RX ADMIN — Medication 10 MG: at 20:32

## 2025-06-12 RX ADMIN — ENOXAPARIN SODIUM 30 MG: 100 INJECTION SUBCUTANEOUS at 20:32

## 2025-06-12 RX ADMIN — Medication 1 CAPSULE: at 08:28

## 2025-06-12 RX ADMIN — SENNOSIDES 17.2 MG: 8.6 TABLET, FILM COATED ORAL at 08:28

## 2025-06-12 RX ADMIN — CARVEDILOL 12.5 MG: 12.5 TABLET, FILM COATED ORAL at 17:45

## 2025-06-12 RX ADMIN — CYANOCOBALAMIN TAB 500 MCG 1000 MCG: 500 TAB at 08:28

## 2025-06-12 RX ADMIN — ENOXAPARIN SODIUM 30 MG: 100 INJECTION SUBCUTANEOUS at 08:28

## 2025-06-12 RX ADMIN — GABAPENTIN 300 MG: 300 CAPSULE ORAL at 20:33

## 2025-06-12 RX ADMIN — GABAPENTIN 300 MG: 300 CAPSULE ORAL at 08:29

## 2025-06-12 RX ADMIN — FOLIC ACID 1 MG: 1 TABLET ORAL at 08:29

## 2025-06-12 RX ADMIN — SIMETHICONE 80 MG: 80 TABLET, CHEWABLE ORAL at 22:18

## 2025-06-12 RX ADMIN — CARBOXYMETHYLCELLULOSE SODIUM 1 DROP: 0.5 SOLUTION/ DROPS OPHTHALMIC at 17:45

## 2025-06-12 RX ADMIN — OXYCODONE 5 MG: 5 TABLET ORAL at 20:32

## 2025-06-12 ASSESSMENT — PAIN DESCRIPTION - ORIENTATION: ORIENTATION: LEFT

## 2025-06-12 ASSESSMENT — PAIN SCALES - GENERAL
PAINLEVEL_OUTOF10: 5
PAINLEVEL_OUTOF10: 5

## 2025-06-12 ASSESSMENT — PAIN DESCRIPTION - DESCRIPTORS: DESCRIPTORS: ACHING;NAGGING;DISCOMFORT

## 2025-06-12 ASSESSMENT — PAIN DESCRIPTION - LOCATION: LOCATION: LEG;HIP

## 2025-06-12 NOTE — PROGRESS NOTES
Pt returned to facility by EMS following Ortho appointment [facility].  Incision to L thigh TURNER.  EMS stated Ortho will call for next appointment.

## 2025-06-12 NOTE — PROGRESS NOTES
Physical Therapy  Facility/Department: [facility] MED SURG  Daily Treatment Note  NAME: Jose Palma  : 1945  MRN: [id number]    Date of Service: 2025    Discharge Recommendations:  Continue to assess pending progress, Home with assist PRN, Therapy recommended at discharge      Patient Diagnosis(es): There were no encounter diagnoses.    Assessment  Assessment: Patient awake in bed. [facility] Patient able to transition supine to sit with SBA today. Engaged patient in B LE therex in sitting. Utilized towel under patient's L foot to assist with ROM. Partial cotreatment with OT. Patient stood from bedside with Mod A of 2 with cues for proper hand placement. Able to achieve improved upright posture. Patient reports taking a laxative this morning and wants to try to use the bathroom before leaving for his appointment. Stood with Mod A of 2 and transferred with RW and max verbal cueing to the [facility]. Patient has difficulty with weight shifting and stepping and required cues and encouragement to complete. Patient partially completed joyce hygiene but required assistance to fully cleanse. Required 2 standing trials to complete hygiene and clothing management with total assistance. Patient transferred back to bed from [facility] with RW and Mod A of 2 requiring assistance to safely sit down as he tried to sit before being close enough to the bed. Able to scoot on the bed with SBA and required Mod A for sit to supine.  Activity Tolerance: Patient limited by pain;Patient tolerated treatment well    Plan  Physical Therapy Plan  General Plan: 3-5 times per week  Days Per Week: 5 Days  Therapy Duration: 2 Weeks  Current Treatment Recommendations: Strengthening;ROM;Balance training;Functional mobility training;Transfer training;Endurance training;Pain management;Stair training;Gait training;Home exercise program;Safety education & training;Patient/Caregiver education & training;Equipment

## 2025-06-12 NOTE — PROGRESS NOTES
Comprehensive Nutrition Assessment    Type and Reason for Visit:   (Follow-up)    Nutrition Recommendations/Plan:   Continue current diet as medically appropriate and tolerated.  Continue to encourage PO intakes as appropriate. Avg of 54-78% x 9 meals.   Continue Cornelius and Ensure Plus BID.    RD to follow patient and available PRN.      Malnutrition Assessment:  Malnutrition Status:  No malnutrition (06/09/25 1141)    Context:        Findings of the 6 clinical characteristics of malnutrition:  Energy Intake:     Weight Loss:        Body Fat Loss:        Muscle Mass Loss:       Fluid Accumulation:        Strength:       Nutrition Assessment:    Patient is at moderate risk for ongoing nutritional compromise r/t gair-good intakes.    Nutrition Related Findings:    Weight gain of 7 lb in 5 days, may be r/t fluid status. Trace edema BLE. Medications: folic acid, vitamin D, lactobacillus, B12, lasix. Labs: BUN 41, Cr 2.5, GFR 26, glu , alb 3.1, alk phos 102, vitamin D 18.1, iron 28. Intakes have improved some since initial assessment. Wound Type: Surgical Incision (Incision left hip, abrasion LUE)       Current Nutrition Intake & Therapies:    Average Meal Intake: 51-75%, % (54-78% x 9)  Average Supplements Intake: None Ordered  ADULT DIET; Regular  ADULT ORAL NUTRITION SUPPLEMENT; Lunch, Dinner; Wound Healing Oral Supplement  ADULT ORAL NUTRITION SUPPLEMENT; Breakfast, Lunch; Standard High Calorie/High Protein Oral Supplement    Anthropometric Measures:  Height: 182.9 cm (6')  Ideal Body Weight (IBW): 178 lbs (81 kg)       Current Body Weight: 106.6 kg (235 lb), 132 % IBW. Weight Source: Bed scale  Current BMI (kg/m2): 31.9           Weight Adjustment For: No Adjustment                 BMI Categories: Obese Class 1 (BMI 30.0-34.9)    Estimated Daily Nutrient Needs:  Energy Requirements Based On: Formula  Weight Used for Energy Requirements: Current  Energy (kcal/day): 3003-6969  Weight Used for Protein

## 2025-06-12 NOTE — PROGRESS NOTES
Occupational Therapy  Facility/Department: [facility] MED SURG  Daily Treatment Note  NAME: Jose Palma  : 1945  MRN: [id number]    Date of Service: 2025    Discharge Recommendations:  Continue to assess pending progress    Patient Diagnosis(es): There were no encounter diagnoses.     Assessment   Assessment: Pt received seated at EOB on this date co tx with PTA. Pt transferred to standing with MOD A x2 with min verbal cuing for hand placement. Pt transferred from bed to BSC with MOD x2 with max verbal cuing and difficulty weight shifting. Pt required total assist to complete LB clothing management in prep for toileting. Pt voided and was able to complete joyce hygiene but required assist to thoroughly cleanse joyce area. Pt come to stand x2 trials with total assist to complete LB clothing management. Pt required max verbal cuing for sequencing standing and correcting posture in standing. Pt transferred from BSC to EOB with MOD x2 with difficulty weight shifting. Pt fatigued and required mod x2 to transfer to sitting position as he was not flush with bed prior to descending to seated position. Pt scooted at EOB with SBA. PTA assisted patient with bed mobility requiring MOD A to transfer to supine. Pt positioned for comfort. Spouse entered room upon exiting. Pt has ortho appt today. Will attempt further treatment on following date.    Subjective  Subjective  Subjective: I have my appt today but I took a laxative I really want to use the bathroom before I get on that ambulance.  Pain: Pain in L hip     Objective  Vitals     Bed Mobility Training  Bed Mobility Training: Yes  Scooting: Stand by assistance  Balance  Sitting: Intact  Standing: Impaired;With support  Standing - Static: Constant support  Transfer Training  Transfer Training: Yes  Overall Level of Assistance: Partial/Moderate assistance;2 Person assistance  Sit to Stand: Partial/Moderate assistance;2 Person assistance  Stand to Sit: Minimal

## 2025-06-12 NOTE — FLOWSHEET NOTE
06/11/25 2225   Assessment   Charting Type Shift assessment   Psychosocial   Psychosocial (WDL) WDL   Neurological   Neuro (WDL) WDL   Kim Coma Scale   Eye Opening 4   Best Verbal Response 5   Best Motor Response 6   [place] Coma Scale Score 15   NIH Stroke Scale   NIH Stroke Scale Assessed No   HEENT (Head, Ears, Eyes, Nose, & Throat)   HEENT (WDL) X   Right Eye Glasses   Left Eye Glasses   Teeth Dentures upper;Dentures lower   Respiratory   Respiratory (WDL) WDL   Cardiac   Cardiac (WDL) WDL   Gastrointestinal   Abdominal (WDL) X   Abdomen Inspection Rounded   Last BM (including prior to admit) 06/10/25   RUQ Bowel Sounds Active   LUQ Bowel Sounds Active   RLQ Bowel Sounds Active   LLQ Bowel Sounds Active   Genitourinary   Genitourinary (WDL) WDL   Peripheral Vascular   Peripheral Vascular (WDL) X   RLE Edema Trace   LLE Edema Trace   Skin Integumentary    Skin Color Pale   Skin Condition/Temp Warm;Dry   Skin Integrity Abrasion   Location SHELLIE   Multiple Skin Integrity Sites Yes   Skin Integumentary (WDL) X   Skin Integrity Site 2   Skin Integrity Location 2 Wound (see LDA)   Location 2 Lhip   Preventative Dressing Yes   Date Applied 06/11/25   Assessed this shift Yes   Care Plan - Skin/Tissue Integrity Goals   Skin Integrity Remains Intact Monitor for areas of redness and/or skin breakdown   Musculoskeletal   RL Extremity Weakness;Unsteady   LL Extremity Weakness;Unsteady;Surgery   Musculoskeletal (WDL) X   Musculoskeletal Details   L Hip Surgery   Care Plan - Discharge Planning Goals   Discharge to home or other facility with appropriate resources Identify barriers to discharge with patient and caregiver;Refer to discharge planning if patient needs post-hospital services based on physician order or complex needs related to functional status, cognitive ability or social support system

## 2025-06-12 NOTE — PLAN OF CARE
Problem: Discharge Planning  Goal: Discharge to home or other facility with appropriate resources  6/11/2025 2242 by Kenisha Salinas RN  Outcome: Progressing  Flowsheets (Taken 6/11/2025 2225)  Discharge to home or other facility with appropriate resources:   Identify barriers to discharge with patient and caregiver   Refer to discharge planning if patient needs post-hospital services based on physician order or complex needs related to functional status, cognitive ability or social support system  6/11/2025 0932 by Shannan Lincoln RN  Outcome: Progressing     Problem: Safety - Adult  Goal: Free from fall injury  6/11/2025 2242 by Kenisha Salinas RN  Outcome: Progressing  6/11/2025 0932 by Shannan Lincoln RN  Outcome: Progressing     Problem: Skin/Tissue Integrity  Goal: Skin integrity remains intact  Description: 1.  Monitor for areas of redness and/or skin breakdown2.  Assess vascular access sites hourly3.  Every 4-6 hours minimum:  Change oxygen saturation probe site4.  Every 4-6 hours:  If on nasal continuous positive airway pressure, respiratory therapy assess nares and determine need for appliance change or resting period  6/11/2025 2242 by Kenisha Salinas RN  Outcome: Progressing  Flowsheets (Taken 6/11/2025 2225)  Skin Integrity Remains Intact: Monitor for areas of redness and/or skin breakdown  6/11/2025 0932 by Shannan Lincoln RN  Outcome: Progressing     Problem: ABCDS Injury Assessment  Goal: Absence of physical injury  6/11/2025 2242 by Kenisha Salinas RN  Outcome: Progressing  6/11/2025 0932 by Shannan Lincoln RN  Outcome: Progressing     Problem: Nutrition Deficit:  Goal: Optimize nutritional status  6/11/2025 2242 by Kenisha Salinas RN  Outcome: Progressing  6/11/2025 0932 by Shannan Lincoln RN  Outcome: Progressing     Problem: Pain  Goal: Verbalizes/displays adequate comfort level or baseline comfort level  6/11/2025 2242 by Kenisha Salinas RN  Outcome: Progressing  6/11/2025 0932

## 2025-06-13 PROCEDURE — 6360000002 HC RX W HCPCS: Performed by: INTERNAL MEDICINE

## 2025-06-13 PROCEDURE — 6370000000 HC RX 637 (ALT 250 FOR IP): Performed by: PHYSICIAN ASSISTANT

## 2025-06-13 PROCEDURE — 94761 N-INVAS EAR/PLS OXIMETRY MLT: CPT

## 2025-06-13 PROCEDURE — 1200000002 HC SEMI PRIVATE SWING BED

## 2025-06-13 PROCEDURE — 97116 GAIT TRAINING THERAPY: CPT

## 2025-06-13 PROCEDURE — 97530 THERAPEUTIC ACTIVITIES: CPT

## 2025-06-13 RX ORDER — SODIUM CHLOR/HYPOCHLOROUS ACID 0.033 %
SOLUTION, IRRIGATION IRRIGATION DAILY
Status: DISCONTINUED | OUTPATIENT
Start: 2025-06-13 | End: 2025-06-13

## 2025-06-13 RX ORDER — FERROUS SULFATE 324(65)MG
324 TABLET, DELAYED RELEASE (ENTERIC COATED) ORAL
Status: DISCONTINUED | OUTPATIENT
Start: 2025-06-14 | End: 2025-06-21 | Stop reason: HOSPADM

## 2025-06-13 RX ORDER — SODIUM CHLOR/HYPOCHLOROUS ACID 0.033 %
SOLUTION, IRRIGATION IRRIGATION DAILY
Status: DISCONTINUED | OUTPATIENT
Start: 2025-06-13 | End: 2025-06-21 | Stop reason: HOSPADM

## 2025-06-13 RX ORDER — SODIUM CHLOR/HYPOCHLOROUS ACID 0.033 %
SOLUTION, IRRIGATION IRRIGATION
Status: DISPENSED
Start: 2025-06-13 | End: 2025-06-13

## 2025-06-13 RX ADMIN — SIMETHICONE 80 MG: 80 TABLET, CHEWABLE ORAL at 20:23

## 2025-06-13 RX ADMIN — PANTOPRAZOLE SODIUM 40 MG: 40 TABLET, DELAYED RELEASE ORAL at 05:49

## 2025-06-13 RX ADMIN — GABAPENTIN 300 MG: 300 CAPSULE ORAL at 20:22

## 2025-06-13 RX ADMIN — FOLIC ACID 1 MG: 1 TABLET ORAL at 08:39

## 2025-06-13 RX ADMIN — CARBOXYMETHYLCELLULOSE SODIUM 1 DROP: 0.5 SOLUTION/ DROPS OPHTHALMIC at 08:39

## 2025-06-13 RX ADMIN — ENOXAPARIN SODIUM 30 MG: 100 INJECTION SUBCUTANEOUS at 20:20

## 2025-06-13 RX ADMIN — Medication 10 MG: at 20:23

## 2025-06-13 RX ADMIN — CARBOXYMETHYLCELLULOSE SODIUM 1 DROP: 0.5 SOLUTION/ DROPS OPHTHALMIC at 20:21

## 2025-06-13 RX ADMIN — ROSUVASTATIN CALCIUM 10 MG: 10 TABLET, FILM COATED ORAL at 17:07

## 2025-06-13 RX ADMIN — Medication 1 CAPSULE: at 08:38

## 2025-06-13 RX ADMIN — SENNOSIDES 17.2 MG: 8.6 TABLET, FILM COATED ORAL at 08:38

## 2025-06-13 RX ADMIN — Medication 10 MG: at 20:22

## 2025-06-13 RX ADMIN — CILOSTAZOL 50 MG: 100 TABLET ORAL at 20:23

## 2025-06-13 RX ADMIN — CARVEDILOL 12.5 MG: 12.5 TABLET, FILM COATED ORAL at 05:49

## 2025-06-13 RX ADMIN — CYANOCOBALAMIN TAB 500 MCG 1000 MCG: 500 TAB at 08:38

## 2025-06-13 RX ADMIN — CILOSTAZOL 50 MG: 100 TABLET ORAL at 08:38

## 2025-06-13 RX ADMIN — GABAPENTIN 300 MG: 300 CAPSULE ORAL at 08:38

## 2025-06-13 RX ADMIN — OXYCODONE 5 MG: 5 TABLET ORAL at 20:21

## 2025-06-13 RX ADMIN — OXYCODONE 5 MG: 5 TABLET ORAL at 09:52

## 2025-06-13 RX ADMIN — ENOXAPARIN SODIUM 30 MG: 100 INJECTION SUBCUTANEOUS at 08:39

## 2025-06-13 RX ADMIN — ASPIRIN 81 MG: 81 TABLET, COATED ORAL at 08:39

## 2025-06-13 RX ADMIN — Medication: at 11:42

## 2025-06-13 RX ADMIN — CARVEDILOL 12.5 MG: 12.5 TABLET, FILM COATED ORAL at 17:07

## 2025-06-13 ASSESSMENT — PAIN DESCRIPTION - LOCATION
LOCATION: HIP;LEG
LOCATION: LEG;KNEE

## 2025-06-13 ASSESSMENT — PAIN SCALES - GENERAL
PAINLEVEL_OUTOF10: 5
PAINLEVEL_OUTOF10: 5

## 2025-06-13 ASSESSMENT — PAIN DESCRIPTION - ORIENTATION: ORIENTATION: LEFT

## 2025-06-13 ASSESSMENT — PAIN DESCRIPTION - DESCRIPTORS: DESCRIPTORS: ACHING;NAGGING;DISCOMFORT

## 2025-06-13 NOTE — FLOWSHEET NOTE
06/13/25 0800   Assessment   Charting Type Shift assessment   Psychosocial   Psychosocial (WDL) WDL   Neurological   Neuro (WDL) WDL   Kim Coma Scale   Eye Opening 4   Best Verbal Response 5   Best Motor Response 6   [place] Coma Scale Score 15   HEENT (Head, Ears, Eyes, Nose, & Throat)   HEENT (WDL) X   Right Eye Glasses   Left Eye Glasses   Teeth Dentures upper;Dentures lower   Respiratory   Respiratory (WDL) WDL   Gastrointestinal   Abdominal (WDL) X   Abdomen Inspection Rounded   Last BM (including prior to admit) 06/13/25   RUQ Bowel Sounds Hypoactive   LUQ Bowel Sounds Hypoactive   RLQ Bowel Sounds Hypoactive   LLQ Bowel Sounds Hypoactive   Peripheral Vascular   Peripheral Vascular (WDL) X   RLE Edema Trace   LLE Edema Trace   Anti-Embolism   Anti-Embolism Intervention Medication   Skin Integumentary    Skin Color Pale   Skin Condition/Temp Warm;Dry   Skin Integumentary (WDL) X   Skin Integrity Site 2   Skin Integrity Location 2 Incision (see LDA)   Location 2 left hip   Preventative Dressing No

## 2025-06-13 NOTE — PROGRESS NOTES
Occupational Therapy  Facility/Department: [facility] MED SURG  Daily Treatment Note  NAME: Jose Palma  : 1945  MRN: [id number]    Date of Service: 2025    Discharge Recommendations:  Continue to assess pending progress     Patient Diagnosis(es): There were no encounter diagnoses.     Assessment   Assessment: Pt received in bed on this date with spouse present in room. Pt transferred from supine to sitting at EOB with increased time with SBA. Pt sat at EOB unsupported. Pt come to stand from EOB with min x2 assist to RW. Pt attempted weight shifting in standing. Pt had seated rest break. Pt come to stand and transferred to WC. Pt transported to therapy gym via WC and positioned in parallel bars. Pt come to stand with MOD x2 assist to parallel bars and requied max verbal cuing for weight shifting education in order to advance steps. Pt ambulated x6 feet with assist x2 and WC follow in parallel bars. Pt had seated rest break and transferred to standing with MOD x2. Pt ambulated x6 feet again with difficulty weight shifting. Pt requested to return back to room at this time reporting that he needed to toilet. Pt transported back to room but upon arrival patient reports he doesn't need to toilet at this time. Pt requesting to rest and lunch tray arrived. Attempted sit to stand x4 trials patient unable to come to full stand secondary to fatigue. Pt requesting to remain in WC. Pt AMOS with lunch tray and call light in reach upon exit.  Activity Tolerance: Patient limited by pain;Patient tolerated treatment well      Subjective  Subjective  Subjective: I don't feel great I have had to use the bathroom twice this morning its been a mess.  Pain: Pain in L hip     Objective  Vitals     Bed Mobility Training  Bed Mobility Training: Yes  Overall Level of Assistance: Stand by assistance  Interventions: Verbal cues  Rolling: Stand by assistance  Supine to Sit: Stand by assistance  Sit to Supine: Partial/Moderate

## 2025-06-13 NOTE — PROGRESS NOTES
In to assess patient left hip incisions. Two incisions noted, one to left lower hip and one to left upper hip. Both areas are well approximated at this time, some mild redness noted surrounding wounds, no drainage or odor noted, patient with no complaints of pain at this time. Both incisions noted to have some scabbing. Areas cleansed with vashe. Upper hip incision reinforced with steristrips just as a precautionary measure. I recommend cleansing both incisions daily with vashe, and keeping areas moisturized, nursing staff to continue to monitor incision sites with shift assessments, inform provider or wound care nurse of wound worsening.

## 2025-06-13 NOTE — PLAN OF CARE
Problem: Discharge Planning  Goal: Discharge to home or other facility with appropriate resources  Outcome: Progressing     Problem: Safety - Adult  Goal: Free from fall injury  Outcome: Progressing     Problem: Skin/Tissue Integrity  Goal: Skin integrity remains intact  Description: 1.  Monitor for areas of redness and/or skin breakdown2.  Assess vascular access sites hourly3.  Every 4-6 hours minimum:  Change oxygen saturation probe site4.  Every 4-6 hours:  If on nasal continuous positive airway pressure, respiratory therapy assess nares and determine need for appliance change or resting period  Outcome: Progressing     Problem: Nutrition Deficit:  Goal: Optimize nutritional status  Recent Flowsheet Documentation  Taken 6/12/2025 1342 by Delmy Henry RD  Nutrient intake appropriate for improving, restoring, or maintaining nutritional needs: Monitor oral intake, labs, and treatment plans

## 2025-06-14 LAB
ALBUMIN SERPL-MCNC: 3.1 G/DL (ref 3.4–4.8)
ALBUMIN/GLOB SERPL: 1 {RATIO} (ref 0.8–2)
ALP SERPL-CCNC: 100 U/L (ref 25–100)
ALT SERPL-CCNC: 7 U/L (ref 4–36)
ANION GAP SERPL CALCULATED.3IONS-SCNC: 11 MMOL/L (ref 3–16)
AST SERPL-CCNC: 15 U/L (ref 8–33)
BASOPHILS # BLD: 0 K/UL (ref 0–0.1)
BASOPHILS NFR BLD: 0.4 %
BILIRUB SERPL-MCNC: 0.5 MG/DL (ref 0.3–1.2)
BUN SERPL-MCNC: 46 MG/DL (ref 6–20)
CALCIUM SERPL-MCNC: 9.2 MG/DL (ref 8.3–10.6)
CHLORIDE SERPL-SCNC: 99 MMOL/L (ref 98–107)
CO2 SERPL-SCNC: 26 MMOL/L (ref 20–30)
CREAT SERPL-MCNC: 2.3 MG/DL (ref 0.8–1.3)
EOSINOPHIL # BLD: 0.2 K/UL (ref 0–0.4)
EOSINOPHIL NFR BLD: 2.3 %
ERYTHROCYTE [DISTWIDTH] IN BLOOD BY AUTOMATED COUNT: 13.2 % (ref 11–16)
GFR SERPLBLD CREATININE-BSD FMLA CKD-EPI: 28 ML/MIN/{1.73_M2}
GLOBULIN SER CALC-MCNC: 3.2 G/DL
GLUCOSE SERPL-MCNC: 99 MG/DL (ref 74–106)
HCT VFR BLD AUTO: 27.6 % (ref 40–54)
HGB BLD-MCNC: 8.8 G/DL (ref 13–18)
IMM GRANULOCYTES # BLD: 0.1 K/UL
IMM GRANULOCYTES NFR BLD: 0.8 % (ref 0–5)
LYMPHOCYTES # BLD: 2.1 K/UL (ref 1.5–4)
LYMPHOCYTES NFR BLD: 20.3 %
MAGNESIUM SERPL-MCNC: 2.1 MG/DL (ref 1.7–2.4)
MCH RBC QN AUTO: 30 PG (ref 27–32)
MCHC RBC AUTO-ENTMCNC: 31.9 G/DL (ref 31–35)
MCV RBC AUTO: 94.2 FL (ref 80–100)
MONOCYTES # BLD: 1 K/UL (ref 0.2–0.8)
MONOCYTES NFR BLD: 10.1 %
NEUTROPHILS # BLD: 6.8 K/UL (ref 2–7.5)
NEUTS SEG NFR BLD: 66.1 %
PLATELET # BLD AUTO: 380 K/UL (ref 150–400)
PMV BLD AUTO: 8.8 FL (ref 6–10)
POTASSIUM SERPL-SCNC: 3.4 MMOL/L (ref 3.4–5.1)
PROT SERPL-MCNC: 6.3 G/DL (ref 6.4–8.3)
RBC # BLD AUTO: 2.93 M/UL (ref 4.5–6)
SODIUM SERPL-SCNC: 136 MMOL/L (ref 136–145)
WBC # BLD AUTO: 10.3 K/UL (ref 4–11)

## 2025-06-14 PROCEDURE — 94761 N-INVAS EAR/PLS OXIMETRY MLT: CPT

## 2025-06-14 PROCEDURE — 85025 COMPLETE CBC W/AUTO DIFF WBC: CPT

## 2025-06-14 PROCEDURE — 36415 COLL VENOUS BLD VENIPUNCTURE: CPT

## 2025-06-14 PROCEDURE — 80053 COMPREHEN METABOLIC PANEL: CPT

## 2025-06-14 PROCEDURE — 1200000002 HC SEMI PRIVATE SWING BED

## 2025-06-14 PROCEDURE — 6370000000 HC RX 637 (ALT 250 FOR IP): Performed by: PHYSICIAN ASSISTANT

## 2025-06-14 PROCEDURE — 83735 ASSAY OF MAGNESIUM: CPT

## 2025-06-14 PROCEDURE — 6360000002 HC RX W HCPCS: Performed by: INTERNAL MEDICINE

## 2025-06-14 PROCEDURE — 2580000003 HC RX 258: Performed by: PHYSICIAN ASSISTANT

## 2025-06-14 RX ORDER — 0.9 % SODIUM CHLORIDE 0.9 %
500 INTRAVENOUS SOLUTION INTRAVENOUS ONCE
Status: COMPLETED | OUTPATIENT
Start: 2025-06-14 | End: 2025-06-14

## 2025-06-14 RX ADMIN — ERGOCALCIFEROL 50000 UNITS: 1.25 CAPSULE ORAL at 08:43

## 2025-06-14 RX ADMIN — CILOSTAZOL 50 MG: 100 TABLET ORAL at 08:43

## 2025-06-14 RX ADMIN — ASPIRIN 81 MG: 81 TABLET, COATED ORAL at 08:43

## 2025-06-14 RX ADMIN — PANTOPRAZOLE SODIUM 40 MG: 40 TABLET, DELAYED RELEASE ORAL at 06:02

## 2025-06-14 RX ADMIN — ENOXAPARIN SODIUM 30 MG: 100 INJECTION SUBCUTANEOUS at 08:48

## 2025-06-14 RX ADMIN — GABAPENTIN 300 MG: 300 CAPSULE ORAL at 08:43

## 2025-06-14 RX ADMIN — ENOXAPARIN SODIUM 30 MG: 100 INJECTION SUBCUTANEOUS at 20:49

## 2025-06-14 RX ADMIN — ROSUVASTATIN CALCIUM 10 MG: 10 TABLET, FILM COATED ORAL at 17:18

## 2025-06-14 RX ADMIN — FERROUS SULFATE TAB EC 324 MG (65 MG FE EQUIVALENT) 324 MG: 324 (65 FE) TABLET DELAYED RESPONSE at 08:43

## 2025-06-14 RX ADMIN — Medication: at 21:00

## 2025-06-14 RX ADMIN — SODIUM CHLORIDE 500 ML: 0.9 INJECTION, SOLUTION INTRAVENOUS at 11:55

## 2025-06-14 RX ADMIN — CARVEDILOL 12.5 MG: 12.5 TABLET, FILM COATED ORAL at 17:18

## 2025-06-14 RX ADMIN — DICLOFENAC SODIUM 2 G: 10 GEL TOPICAL at 20:58

## 2025-06-14 RX ADMIN — Medication 1 CAPSULE: at 08:42

## 2025-06-14 RX ADMIN — SIMETHICONE 80 MG: 80 TABLET, CHEWABLE ORAL at 20:54

## 2025-06-14 RX ADMIN — CILOSTAZOL 50 MG: 100 TABLET ORAL at 20:50

## 2025-06-14 RX ADMIN — SENNOSIDES 17.2 MG: 8.6 TABLET, FILM COATED ORAL at 08:42

## 2025-06-14 RX ADMIN — SIMETHICONE 80 MG: 80 TABLET, CHEWABLE ORAL at 20:49

## 2025-06-14 RX ADMIN — OXYCODONE 5 MG: 5 TABLET ORAL at 20:49

## 2025-06-14 RX ADMIN — DICLOFENAC SODIUM 2 G: 10 GEL TOPICAL at 11:42

## 2025-06-14 RX ADMIN — FOLIC ACID 1 MG: 1 TABLET ORAL at 08:43

## 2025-06-14 RX ADMIN — CYANOCOBALAMIN TAB 500 MCG 1000 MCG: 500 TAB at 08:43

## 2025-06-14 RX ADMIN — Medication 10 MG: at 20:49

## 2025-06-14 RX ADMIN — GABAPENTIN 300 MG: 300 CAPSULE ORAL at 20:50

## 2025-06-14 ASSESSMENT — PAIN SCALES - GENERAL
PAINLEVEL_OUTOF10: 4
PAINLEVEL_OUTOF10: 3

## 2025-06-14 ASSESSMENT — PAIN DESCRIPTION - LOCATION
LOCATION: LEG
LOCATION: FOOT

## 2025-06-14 ASSESSMENT — PAIN DESCRIPTION - ORIENTATION
ORIENTATION: LEFT
ORIENTATION: RIGHT

## 2025-06-14 ASSESSMENT — PAIN DESCRIPTION - DESCRIPTORS
DESCRIPTORS: ACHING;DISCOMFORT
DESCRIPTORS: BURNING

## 2025-06-14 NOTE — PLAN OF CARE
Problem: Discharge Planning  Goal: Discharge to home or other facility with appropriate resources  Outcome: Progressing     Problem: Safety - Adult  Goal: Free from fall injury  6/14/2025 0900 by Nazanin Murphy RN  Outcome: Progressing  6/14/2025 0209 by Lonny Suero RN  Outcome: Progressing  6/14/2025 0209 by Lonny Suero RN  Outcome: Progressing     Problem: Skin/Tissue Integrity  Goal: Skin integrity remains intact  Description: 1.  Monitor for areas of redness and/or skin breakdown2.  Assess vascular access sites hourly3.  Every 4-6 hours minimum:  Change oxygen saturation probe site4.  Every 4-6 hours:  If on nasal continuous positive airway pressure, respiratory therapy assess nares and determine need for appliance change or resting period  6/14/2025 0209 by Lonny Suero RN  Outcome: Progressing  6/14/2025 0209 by Lonny Suero RN  Outcome: Progressing     Problem: Pain  Goal: Verbalizes/displays adequate comfort level or baseline comfort level  6/14/2025 0209 by Lonny Suero RN  Outcome: Progressing  6/14/2025 0209 by Lonny Suero RN  Outcome: Progressing

## 2025-06-14 NOTE — PLAN OF CARE
Problem: Safety - Adult  Goal: Free from fall injury  6/14/2025 0209 by Lonny Suero RN  Outcome: Progressing  6/14/2025 0209 by Lonny Suero RN  Outcome: Progressing     Problem: Skin/Tissue Integrity  Goal: Skin integrity remains intact  Description: 1.  Monitor for areas of redness and/or skin breakdown2.  Assess vascular access sites hourly3.  Every 4-6 hours minimum:  Change oxygen saturation probe site4.  Every 4-6 hours:  If on nasal continuous positive airway pressure, respiratory therapy assess nares and determine need for appliance change or resting period  6/14/2025 0209 by Lonny Suero RN  Outcome: Progressing  6/14/2025 0209 by Lonny Suero RN  Outcome: Progressing     Problem: Pain  Goal: Verbalizes/displays adequate comfort level or baseline comfort level  6/14/2025 0209 by Lonny Suero RN  Outcome: Progressing  6/14/2025 0209 by Lonny Suero RN  Outcome: Progressing

## 2025-06-14 NOTE — FLOWSHEET NOTE
06/14/25 0900   Assessment   Charting Type Shift assessment   Psychosocial   Psychosocial (WDL) WDL   Neurological   Neuro (WDL) WDL   Kim Coma Scale   Eye Opening 4   Best Verbal Response 5   Best Motor Response 6   [place] Coma Scale Score 15   HEENT (Head, Ears, Eyes, Nose, & Throat)   HEENT (WDL) X   Right Eye Glasses   Left Eye Glasses   Teeth Dentures upper;Dentures lower   Respiratory   Respiratory (WDL) WDL   Cardiac   Cardiac (WDL) WDL   Gastrointestinal   Abdominal (WDL) X   Abdomen Inspection Rounded   RUQ Bowel Sounds Hypoactive   LUQ Bowel Sounds Hypoactive   RLQ Bowel Sounds Hypoactive   LLQ Bowel Sounds Hypoactive   Tenderness No guarding   Genitourinary   Genitourinary (WDL) WDL   Urine Assessment   Urine Color Yellow/straw   Urine Appearance Clear   Peripheral Vascular   Peripheral Vascular (WDL) X   RLE Edema Trace   LLE Edema Trace   Anti-Embolism   Anti-Embolism Intervention Medication   Skin Integumentary    Skin Color Pale   Skin Condition/Temp Dry;Warm   Skin Integrity Abrasion   Location LUE   Skin Integumentary (WDL) X   Skin Integrity Site 2   Skin Integrity Location 2 Incision (see LDA)   Location 2 L hip   Preventative Dressing No   Musculoskeletal   RL Extremity Weakness;Unsteady   LL Extremity Weakness;Unsteady;Surgery   Musculoskeletal (WDL) X   Musculoskeletal Details   L Hip Surgery

## 2025-06-14 NOTE — PLAN OF CARE
Problem: Safety - Adult  Goal: Free from fall injury  Outcome: Progressing     Problem: Skin/Tissue Integrity  Goal: Skin integrity remains intact  Description: 1.  Monitor for areas of redness and/or skin breakdown2.  Assess vascular access sites hourly3.  Every 4-6 hours minimum:  Change oxygen saturation probe site4.  Every 4-6 hours:  If on nasal continuous positive airway pressure, respiratory therapy assess nares and determine need for appliance change or resting period  Outcome: Progressing     Problem: Pain  Goal: Verbalizes/displays adequate comfort level or baseline comfort level  Outcome: Progressing

## 2025-06-15 LAB
ALBUMIN SERPL-MCNC: 3.1 G/DL (ref 3.4–4.8)
ALBUMIN/GLOB SERPL: 1 {RATIO} (ref 0.8–2)
ALP SERPL-CCNC: 100 U/L (ref 25–100)
ALT SERPL-CCNC: 8 U/L (ref 4–36)
ANION GAP SERPL CALCULATED.3IONS-SCNC: 10 MMOL/L (ref 3–16)
AST SERPL-CCNC: 15 U/L (ref 8–33)
BASOPHILS # BLD: 0.1 K/UL (ref 0–0.1)
BASOPHILS NFR BLD: 0.5 %
BILIRUB SERPL-MCNC: 0.5 MG/DL (ref 0.3–1.2)
BUN SERPL-MCNC: 44 MG/DL (ref 6–20)
CALCIUM SERPL-MCNC: 9 MG/DL (ref 8.3–10.6)
CHLORIDE SERPL-SCNC: 101 MMOL/L (ref 98–107)
CO2 SERPL-SCNC: 26 MMOL/L (ref 20–30)
CREAT SERPL-MCNC: 2.1 MG/DL (ref 0.8–1.3)
EOSINOPHIL # BLD: 0.3 K/UL (ref 0–0.4)
EOSINOPHIL NFR BLD: 2.7 %
ERYTHROCYTE [DISTWIDTH] IN BLOOD BY AUTOMATED COUNT: 13.7 % (ref 11–16)
GFR SERPLBLD CREATININE-BSD FMLA CKD-EPI: 32 ML/MIN/{1.73_M2}
GLOBULIN SER CALC-MCNC: 3.1 G/DL
GLUCOSE SERPL-MCNC: 96 MG/DL (ref 74–106)
HCT VFR BLD AUTO: 27.3 % (ref 40–54)
HGB BLD-MCNC: 8.6 G/DL (ref 13–18)
IMM GRANULOCYTES # BLD: 0.1 K/UL
IMM GRANULOCYTES NFR BLD: 0.8 % (ref 0–5)
LYMPHOCYTES # BLD: 2.1 K/UL (ref 1.5–4)
LYMPHOCYTES NFR BLD: 22.3 %
MAGNESIUM SERPL-MCNC: 2.2 MG/DL (ref 1.7–2.4)
MCH RBC QN AUTO: 30.1 PG (ref 27–32)
MCHC RBC AUTO-ENTMCNC: 31.5 G/DL (ref 31–35)
MCV RBC AUTO: 95.5 FL (ref 80–100)
MONOCYTES # BLD: 1 K/UL (ref 0.2–0.8)
MONOCYTES NFR BLD: 10.5 %
NEUTROPHILS # BLD: 6 K/UL (ref 2–7.5)
NEUTS SEG NFR BLD: 63.2 %
PLATELET # BLD AUTO: 362 K/UL (ref 150–400)
PMV BLD AUTO: 9.3 FL (ref 6–10)
POTASSIUM SERPL-SCNC: 3.3 MMOL/L (ref 3.4–5.1)
PROT SERPL-MCNC: 6.2 G/DL (ref 6.4–8.3)
RBC # BLD AUTO: 2.86 M/UL (ref 4.5–6)
SODIUM SERPL-SCNC: 137 MMOL/L (ref 136–145)
WBC # BLD AUTO: 9.4 K/UL (ref 4–11)

## 2025-06-15 PROCEDURE — 6360000002 HC RX W HCPCS: Performed by: INTERNAL MEDICINE

## 2025-06-15 PROCEDURE — 6360000002 HC RX W HCPCS: Performed by: PHYSICIAN ASSISTANT

## 2025-06-15 PROCEDURE — 94761 N-INVAS EAR/PLS OXIMETRY MLT: CPT

## 2025-06-15 PROCEDURE — 80053 COMPREHEN METABOLIC PANEL: CPT

## 2025-06-15 PROCEDURE — 83735 ASSAY OF MAGNESIUM: CPT

## 2025-06-15 PROCEDURE — 6370000000 HC RX 637 (ALT 250 FOR IP): Performed by: PHYSICIAN ASSISTANT

## 2025-06-15 PROCEDURE — 85025 COMPLETE CBC W/AUTO DIFF WBC: CPT

## 2025-06-15 PROCEDURE — 1200000002 HC SEMI PRIVATE SWING BED

## 2025-06-15 PROCEDURE — 36415 COLL VENOUS BLD VENIPUNCTURE: CPT

## 2025-06-15 RX ORDER — POTASSIUM CHLORIDE 750 MG/1
20 CAPSULE, EXTENDED RELEASE ORAL ONCE
Status: COMPLETED | OUTPATIENT
Start: 2025-06-15 | End: 2025-06-15

## 2025-06-15 RX ORDER — ROSUVASTATIN CALCIUM 20 MG/1
40 TABLET, COATED ORAL EVERY EVENING
Status: DISCONTINUED | OUTPATIENT
Start: 2025-06-15 | End: 2025-06-21 | Stop reason: HOSPADM

## 2025-06-15 RX ADMIN — POTASSIUM CHLORIDE 20 MEQ: 750 CAPSULE, EXTENDED RELEASE ORAL at 09:55

## 2025-06-15 RX ADMIN — CILOSTAZOL 50 MG: 100 TABLET ORAL at 21:02

## 2025-06-15 RX ADMIN — CILOSTAZOL 50 MG: 100 TABLET ORAL at 08:19

## 2025-06-15 RX ADMIN — SENNOSIDES 17.2 MG: 8.6 TABLET, FILM COATED ORAL at 08:18

## 2025-06-15 RX ADMIN — Medication 1 CAPSULE: at 08:19

## 2025-06-15 RX ADMIN — DICLOFENAC SODIUM 2 G: 10 GEL TOPICAL at 21:12

## 2025-06-15 RX ADMIN — FERROUS SULFATE TAB EC 324 MG (65 MG FE EQUIVALENT) 324 MG: 324 (65 FE) TABLET DELAYED RESPONSE at 08:19

## 2025-06-15 RX ADMIN — ROSUVASTATIN 40 MG: 20 TABLET, FILM COATED ORAL at 17:25

## 2025-06-15 RX ADMIN — ASPIRIN 81 MG: 81 TABLET, COATED ORAL at 08:19

## 2025-06-15 RX ADMIN — ENOXAPARIN SODIUM 30 MG: 100 INJECTION SUBCUTANEOUS at 08:19

## 2025-06-15 RX ADMIN — EPOETIN ALFA-EPBX 3200 UNITS: 10000 INJECTION, SOLUTION INTRAVENOUS; SUBCUTANEOUS at 13:05

## 2025-06-15 RX ADMIN — DICLOFENAC SODIUM 2 G: 10 GEL TOPICAL at 08:24

## 2025-06-15 RX ADMIN — GABAPENTIN 300 MG: 300 CAPSULE ORAL at 21:02

## 2025-06-15 RX ADMIN — OXYCODONE 5 MG: 5 TABLET ORAL at 21:02

## 2025-06-15 RX ADMIN — CARVEDILOL 12.5 MG: 12.5 TABLET, FILM COATED ORAL at 17:25

## 2025-06-15 RX ADMIN — ENOXAPARIN SODIUM 30 MG: 100 INJECTION SUBCUTANEOUS at 21:01

## 2025-06-15 RX ADMIN — CYANOCOBALAMIN TAB 500 MCG 1000 MCG: 500 TAB at 08:19

## 2025-06-15 RX ADMIN — GABAPENTIN 300 MG: 300 CAPSULE ORAL at 08:19

## 2025-06-15 RX ADMIN — SIMETHICONE 80 MG: 80 TABLET, CHEWABLE ORAL at 21:01

## 2025-06-15 RX ADMIN — Medication 10 MG: at 21:01

## 2025-06-15 RX ADMIN — FOLIC ACID 1 MG: 1 TABLET ORAL at 08:19

## 2025-06-15 RX ADMIN — PANTOPRAZOLE SODIUM 40 MG: 40 TABLET, DELAYED RELEASE ORAL at 05:41

## 2025-06-15 ASSESSMENT — PAIN SCALES - GENERAL
PAINLEVEL_OUTOF10: 4
PAINLEVEL_OUTOF10: 4

## 2025-06-15 ASSESSMENT — PAIN DESCRIPTION - DESCRIPTORS
DESCRIPTORS: ACHING;DISCOMFORT
DESCRIPTORS: BURNING;DISCOMFORT

## 2025-06-15 ASSESSMENT — PAIN DESCRIPTION - LOCATION
LOCATION: FOOT
LOCATION: HIP

## 2025-06-15 ASSESSMENT — PAIN DESCRIPTION - ORIENTATION: ORIENTATION: RIGHT

## 2025-06-15 NOTE — PLAN OF CARE
Problem: Discharge Planning  Goal: Discharge to home or other facility with appropriate resources  6/15/2025 0014 by Lonny Suero RN  Outcome: Progressing  6/15/2025 0008 by Lonny Suero RN  Outcome: Progressing     Problem: Safety - Adult  Goal: Free from fall injury  6/15/2025 0922 by Nazanin Murphy RN  Outcome: Progressing  6/15/2025 0008 by Lonny Suero RN  Outcome: Progressing     Problem: Skin/Tissue Integrity  Goal: Skin integrity remains intact  Description: 1.  Monitor for areas of redness and/or skin breakdown2.  Assess vascular access sites hourly3.  Every 4-6 hours minimum:  Change oxygen saturation probe site4.  Every 4-6 hours:  If on nasal continuous positive airway pressure, respiratory therapy assess nares and determine need for appliance change or resting period  6/15/2025 0922 by Nazanin Murphy RN  Outcome: Progressing  6/15/2025 0008 by Lonny Suero RN  Outcome: Progressing     Problem: ABCDS Injury Assessment  Goal: Absence of physical injury  6/15/2025 0014 by Lonny Suero RN  Outcome: Progressing     Problem: Pain  Goal: Verbalizes/displays adequate comfort level or baseline comfort level  6/15/2025 0922 by Nazanin Murphy RN  Outcome: Progressing  6/15/2025 0014 by Lonny Suero RN  Outcome: Progressing

## 2025-06-15 NOTE — PROGRESS NOTES
Pharmacy Adjustment per [facility] protocol    Jose Palma is a 79 y.o. male. Pharmacy has adjusted medications per [facility] protocol.    Recent Labs     06/14/25  0439 06/15/25  0445   BUN 46* 44*       Recent Labs     06/14/25  0439 06/15/25  0445   CREATININE 2.3* 2.1*       Estimated Creatinine Clearance: 37 mL/min (A) (based on SCr of 2.1 mg/dL (H)).    Height:   Ht Readings from Last 1 Encounters:   06/12/25 1.829 m (6')     Weight:  Wt Readings from Last 1 Encounters:   06/11/25 106.7 kg (235 lb 3 oz)    BMI:  BMI Readings from Last 1 Encounters:   06/12/25 31.90 kg/m²         Plan: Adjust the following medications based on [facility] protocol:           Rosuvastatin back to 40 mg by mouth every evening.    Electronically signed by Darlyn Singleton RPH on 6/15/2025 at 3:01 PM

## 2025-06-16 PROCEDURE — 97110 THERAPEUTIC EXERCISES: CPT | Performed by: OCCUPATIONAL THERAPIST

## 2025-06-16 PROCEDURE — 99308 SBSQ NF CARE LOW MDM 20: CPT | Performed by: INTERNAL MEDICINE

## 2025-06-16 PROCEDURE — 6370000000 HC RX 637 (ALT 250 FOR IP): Performed by: PHYSICIAN ASSISTANT

## 2025-06-16 PROCEDURE — 97530 THERAPEUTIC ACTIVITIES: CPT

## 2025-06-16 PROCEDURE — 97116 GAIT TRAINING THERAPY: CPT

## 2025-06-16 PROCEDURE — 6360000002 HC RX W HCPCS: Performed by: INTERNAL MEDICINE

## 2025-06-16 PROCEDURE — 97530 THERAPEUTIC ACTIVITIES: CPT | Performed by: OCCUPATIONAL THERAPIST

## 2025-06-16 PROCEDURE — 97110 THERAPEUTIC EXERCISES: CPT

## 2025-06-16 PROCEDURE — 94761 N-INVAS EAR/PLS OXIMETRY MLT: CPT

## 2025-06-16 PROCEDURE — 1200000002 HC SEMI PRIVATE SWING BED

## 2025-06-16 RX ORDER — SENNOSIDES 8.6 MG/1
2 TABLET ORAL NIGHTLY PRN
Status: DISCONTINUED | OUTPATIENT
Start: 2025-06-16 | End: 2025-06-21 | Stop reason: HOSPADM

## 2025-06-16 RX ADMIN — PANTOPRAZOLE SODIUM 40 MG: 40 TABLET, DELAYED RELEASE ORAL at 08:39

## 2025-06-16 RX ADMIN — CILOSTAZOL 50 MG: 100 TABLET ORAL at 08:39

## 2025-06-16 RX ADMIN — CARBOXYMETHYLCELLULOSE SODIUM 1 DROP: 0.5 SOLUTION/ DROPS OPHTHALMIC at 17:53

## 2025-06-16 RX ADMIN — CARVEDILOL 12.5 MG: 12.5 TABLET, FILM COATED ORAL at 06:03

## 2025-06-16 RX ADMIN — GABAPENTIN 300 MG: 300 CAPSULE ORAL at 21:07

## 2025-06-16 RX ADMIN — CARBOXYMETHYLCELLULOSE SODIUM 1 DROP: 0.5 SOLUTION/ DROPS OPHTHALMIC at 21:07

## 2025-06-16 RX ADMIN — Medication: at 21:07

## 2025-06-16 RX ADMIN — FERROUS SULFATE TAB EC 324 MG (65 MG FE EQUIVALENT) 324 MG: 324 (65 FE) TABLET DELAYED RESPONSE at 08:39

## 2025-06-16 RX ADMIN — DICLOFENAC SODIUM 2 G: 10 GEL TOPICAL at 08:49

## 2025-06-16 RX ADMIN — Medication 10 MG: at 21:07

## 2025-06-16 RX ADMIN — OXYCODONE 5 MG: 5 TABLET ORAL at 04:24

## 2025-06-16 RX ADMIN — DICLOFENAC SODIUM 2 G: 10 GEL TOPICAL at 21:08

## 2025-06-16 RX ADMIN — Medication 1 CAPSULE: at 08:39

## 2025-06-16 RX ADMIN — ACETAMINOPHEN 650 MG: 325 TABLET, FILM COATED ORAL at 01:42

## 2025-06-16 RX ADMIN — OXYCODONE 5 MG: 5 TABLET ORAL at 21:19

## 2025-06-16 RX ADMIN — CILOSTAZOL 50 MG: 100 TABLET ORAL at 21:07

## 2025-06-16 RX ADMIN — ENOXAPARIN SODIUM 30 MG: 100 INJECTION SUBCUTANEOUS at 08:38

## 2025-06-16 RX ADMIN — FOLIC ACID 1 MG: 1 TABLET ORAL at 08:40

## 2025-06-16 RX ADMIN — CYANOCOBALAMIN TAB 500 MCG 1000 MCG: 500 TAB at 08:39

## 2025-06-16 RX ADMIN — ASPIRIN 81 MG: 81 TABLET, COATED ORAL at 08:39

## 2025-06-16 RX ADMIN — GABAPENTIN 300 MG: 300 CAPSULE ORAL at 08:39

## 2025-06-16 RX ADMIN — ROSUVASTATIN 40 MG: 20 TABLET, FILM COATED ORAL at 17:52

## 2025-06-16 RX ADMIN — ENOXAPARIN SODIUM 30 MG: 100 INJECTION SUBCUTANEOUS at 21:06

## 2025-06-16 RX ADMIN — CARVEDILOL 12.5 MG: 12.5 TABLET, FILM COATED ORAL at 17:53

## 2025-06-16 RX ADMIN — OXYCODONE 5 MG: 5 TABLET ORAL at 10:31

## 2025-06-16 ASSESSMENT — PAIN DESCRIPTION - DESCRIPTORS
DESCRIPTORS: SHARP;ACHING
DESCRIPTORS: ACHING;BURNING;TENDER

## 2025-06-16 ASSESSMENT — PAIN SCALES - GENERAL
PAINLEVEL_OUTOF10: 5
PAINLEVEL_OUTOF10: 6
PAINLEVEL_OUTOF10: 5

## 2025-06-16 ASSESSMENT — PAIN DESCRIPTION - ORIENTATION
ORIENTATION: RIGHT;LEFT
ORIENTATION: LEFT

## 2025-06-16 ASSESSMENT — PAIN DESCRIPTION - LOCATION
LOCATION: LEG;HIP
LOCATION: LEG;HIP

## 2025-06-16 NOTE — PROGRESS NOTES
Physical Therapy  Facility/Department: [facility] MED SURG  Daily Treatment Note  NAME: Jose Palma  : 1945  MRN: [id number]    Date of Service: 2025    Discharge Recommendations:  Continue to assess pending progress, Home with assist PRN, Therapy recommended at discharge     Patient Diagnosis(es): There were no encounter diagnoses.    Assessment  Assessment: Patient up in w/c requesting to get back in bed. Patient stood up to Yamini Steady with Mod A of 2 and was transported from w/c to bed. Required Min A to get L leg into bed.  Activity Tolerance: Patient tolerated treatment well    Plan  Physical Therapy Plan  General Plan: 3-5 times per week  Days Per Week: 5 Days  Therapy Duration: 2 Weeks  Current Treatment Recommendations: Strengthening;ROM;Balance training;Functional mobility training;Transfer training;Endurance training;Pain management;Stair training;Gait training;Home exercise program;Safety education & training;Patient/Caregiver education & training;Equipment evaluation, education, & procurement;Therapeutic activities  PT Plan of Care: 5 times/week    Restrictions  Restrictions/Precautions  Restrictions/Precautions: General Precautions  Required Braces or Orthoses?: No     Subjective   Subjective  Subjective: Patient presents up in w/c requesting to get back in bed.    Objective  Bed Mobility Training  Bed Mobility Training: Yes  Overall Level of Assistance: Stand by assistance;Minimal assistance  Interventions: Verbal cues  Rolling: Stand by assistance  Sit to Supine: Minimal assistance (Min A to get L leg into bed)  Scooting: Stand by assistance  Balance  Sitting: Intact  Standing: Impaired;With support  Standing - Static: Constant support  Transfer Training  Transfer Training: Yes  Overall Level of Assistance: Partial/Moderate assistance;2 Person assistance  Yamini Amador utilized for w/c to bed transfer  Safety Devices  Type of Devices: Left in bed;Bed alarm in place;Call light within

## 2025-06-16 NOTE — PROGRESS NOTES
Progress Note      Subjective:   Chief complaint: declining functional status    Interval History:   Sitting up in bed. Wife Anuradha is at bedside. Reports he had loose stools overnight and doesn't want to take a scheduled stool softener. Patient and his wife also request weekly labs only. Otherwise no complaints. States therapy is going well and he is able to walk more.     Review of systems:    Constitutional:  Denies fever or chills. Positive for declining functional status.   Eyes:  Denies eye pain or redness  HENT:  Denies nasal congestion or sore throat   Respiratory:  Denies cough or shortness of breath   Cardiovascular:  Denies chest pain or edema   GI:  Denies abdominal pain, nausea, vomiting, bloody stools or diarrhea   :  Denies dysuria or frequency  Musculoskeletal:  Denies acute neck pain or body aches. Positive for chronic lower back pain. Positive for intermittent left hip pain.   Integument:  Denies rash or itching  Neurologic:  Denies headache, dizziness, numbness, tingling or unilateral weakness. Positive for parasthesias in upper and lower extremities (chronic)  Psychiatric:  Denies acute depression or acute anxiety    Past medical history, surgical history, family history and social history reviewed and unchanged compared to H&P earlier this admission.    Medications:   Scheduled Meds:   [START ON 6/17/2025] epoetin [id number]  10,000 Units SubCUTAneous Weekly    rosuvastatin  40 mg Oral QPM    diclofenac sodium  2 g Topical BID    vashe wound therapy   Topical Daily    ferrous sulfate  324 mg Oral Daily with breakfast    enoxaparin  30 mg SubCUTAneous BID    vitamin D  50,000 Units Oral Weekly    folic acid  1 mg Oral Daily    nicotine  1 patch TransDERmal Daily    pantoprazole  40 mg Oral QAM AC    carvedilol  12.5 mg Oral Q12H    cilostazol  50 mg Oral BID    cyanocobalamin  1,000 mcg Oral Daily    [Held by provider] furosemide  40 mg Oral Daily    gabapentin  300 mg Oral BID

## 2025-06-16 NOTE — PROGRESS NOTES
Occupational Therapy  Facility/Department: [facility] MED [facility]G  Daily Treatment Note  NAME: Jose Palma  : 1945  MRN: [id number]    Date of Service: 2025    Discharge Recommendations:  Continue to assess pending progress         Patient Diagnosis(es): There were no encounter diagnoses.     Assessment   Assessment: Pt lying in bed with wife present at bedside. Pt completed bed mobility with MIN A and VPs given extra time. Pt stood from EOB MIN A and VPs. Pt completed fx mobility using RW x 3'. Pt then stood from WC with MOD A and completed fx mobility x9' with MIN A x 2 and WC follow.  Pt then stood from WC with MAX A and completed fx mobility x ~11' with WC follow requring VPs for encourgement. Pt fatigued at end of session. Pt requested to stay up in WC for lunch. Pt left up in WC with call light within reach.  Activity Tolerance: Patient tolerated treatment well  Discharge Recommendations: Continue to assess pending progress     Plan  Occupational Therapy Plan  Times Per Week: 3-5  Times Per Day: Once a day  Days Per Week: 5 Days  Therapy Duration: 2 Weeks  Current Treatment Recommendations: Strengthening;Balance training;ROM;Functional mobility training;Endurance training;Pain management;Safety education & training;Patient/Caregiver education & training;Equipment evaluation, education, & procurement;Modalities;Self-Care / ADL;Co-Treatment        Subjective  Subjective  Subjective: I've got to do this  Pain: no pain          Objective  Vitals     Bed Mobility Training  Bed Mobility Training: Yes  Overall Level of Assistance: Stand by assistance  Interventions: Verbal cues  Rolling: Stand by assistance  Supine to Sit: Stand by assistance  Sit to Supine: Minimal assistance  Scooting: Stand by assistance  Balance  Sitting: Intact  Standing: Impaired;With support  Standing - Static: Constant support  Transfer Training  Transfer Training: Yes  Overall Level of Assistance: Partial/Moderate assistance;2  Person assistance  Interventions: Verbal cues;Safety awareness training  Sit to Stand: Partial/Moderate assistance;2 Person assistance  Stand to Sit: Minimal assistance;2 Person assistance  Stand Pivot Transfers: Partial/Moderate assistance;2 Person assistance  Bed to Chair: Partial/Moderate assistance;2 Person assistance  Gait  Gait Training: Yes  Overall Level of Assistance: Minimal assistance;2 Person assistance  Distance (ft): 3 Feet (9', 11')  Assistive Device: Other (comment);Gait belt (using RW)     ADL  Toileting Skilled Clinical Factors: declined need  Functional Mobility: Minimal assistance        Safety Devices  Type of Devices: Left in chair;Call light within reach         Goals  Short Term Goals  Short Term Goal 1: Pt will be MOD I with fx mobility tasks  Short Term Goal 2: Pt will be MOD I with fx transfers  Short Term Goal 3: Pt will be MOD I with dressing tasks  Short Term Goal 4: Pt will be MOD I with toileting tasks  Short Term Goal 5: Pt will be MOD I with bathing tasks        Therapy Time   Individual Concurrent Group Co-treatment   Time In 1119         Time Out 1148         Minutes 29             This note serves as a DC summary in the event of pt discharge.      Radha Mora, OT

## 2025-06-16 NOTE — PROGRESS NOTES
Physical Therapy  Facility/Department: [facility] MED SURG  Physical Therapy Daily Treatment Note    Name: Jose Palma  : 1945  MRN: [id number]  Date of Service: 2025    Discharge Recommendations:  Continue to assess pending progress, Home with assist PRN, Therapy recommended at discharge          Patient Diagnosis(es): There were no encounter diagnoses.  Past Medical History:  has a past medical history of Cancer (HCC), Heart attack (HCC), and Hypertension.  Past Surgical History:  has a past surgical history that includes Neck surgery (10/28/2009); Hydrocele surgery; Skin cancer excision; and knee surgery (Left, 2018).    Assessment  Assessment: Patient received lying in bed on RA. NAD and no c/o pain at rest. Wife present at bedside. Pleasant and agreeable to work with PT. Patient states he was able to stand and take steps in the parallel bars last week but that he has \"been in the bed all weekend.\" Performs AP, heel slides, bridges, QS, SLR and hip ABD/ADD exercises x 10 reps. Able to transition sup to sit with verbal cues and mod I. Good sitting balance. Sit to stand from EOB with RW for support, verbal cues for sequencing and min-mod A x 2. Cues to fully extend knees in stand and to attain upright posture. Performs weight shifts and marching in place in stand. Able to ambulate 3'x1, 9'x1 and 11'x1 with RW and min-mod A, verbal cues for safety/sequencing and w/c follow for safety. Returned to room and left sitting up in w/c to eat lunch. Patient voices being pleased with improved mobility today. Cont PT per POC to outlined goals.  Therapy Prognosis: Good  Requires PT Follow-Up: Yes  Activity Tolerance  Activity Tolerance: Patient tolerated treatment well    Plan  Physical Therapy Plan  General Plan: 3-5 times per week  Days Per Week: 5 Days  Therapy Duration: 2 Weeks  Current Treatment Recommendations: Strengthening, ROM, Balance training, Functional mobility training, Transfer training,

## 2025-06-17 PROCEDURE — 6370000000 HC RX 637 (ALT 250 FOR IP): Performed by: PHYSICIAN ASSISTANT

## 2025-06-17 PROCEDURE — 97530 THERAPEUTIC ACTIVITIES: CPT

## 2025-06-17 PROCEDURE — 94761 N-INVAS EAR/PLS OXIMETRY MLT: CPT

## 2025-06-17 PROCEDURE — 97110 THERAPEUTIC EXERCISES: CPT

## 2025-06-17 PROCEDURE — 1200000002 HC SEMI PRIVATE SWING BED

## 2025-06-17 PROCEDURE — 6360000002 HC RX W HCPCS: Performed by: INTERNAL MEDICINE

## 2025-06-17 PROCEDURE — 97116 GAIT TRAINING THERAPY: CPT

## 2025-06-17 RX ADMIN — PANTOPRAZOLE SODIUM 40 MG: 40 TABLET, DELAYED RELEASE ORAL at 05:40

## 2025-06-17 RX ADMIN — FOLIC ACID 1 MG: 1 TABLET ORAL at 08:16

## 2025-06-17 RX ADMIN — OXYCODONE 5 MG: 5 TABLET ORAL at 20:59

## 2025-06-17 RX ADMIN — CARVEDILOL 12.5 MG: 12.5 TABLET, FILM COATED ORAL at 18:05

## 2025-06-17 RX ADMIN — Medication 1 CAPSULE: at 08:16

## 2025-06-17 RX ADMIN — OXYCODONE 5 MG: 5 TABLET ORAL at 09:35

## 2025-06-17 RX ADMIN — ROSUVASTATIN 40 MG: 20 TABLET, FILM COATED ORAL at 18:05

## 2025-06-17 RX ADMIN — CARBOXYMETHYLCELLULOSE SODIUM 1 DROP: 0.5 SOLUTION/ DROPS OPHTHALMIC at 21:00

## 2025-06-17 RX ADMIN — CILOSTAZOL 50 MG: 100 TABLET ORAL at 08:16

## 2025-06-17 RX ADMIN — CILOSTAZOL 50 MG: 100 TABLET ORAL at 20:59

## 2025-06-17 RX ADMIN — ASPIRIN 81 MG: 81 TABLET, COATED ORAL at 08:16

## 2025-06-17 RX ADMIN — GABAPENTIN 300 MG: 300 CAPSULE ORAL at 21:00

## 2025-06-17 RX ADMIN — CARBOXYMETHYLCELLULOSE SODIUM 1 DROP: 0.5 SOLUTION/ DROPS OPHTHALMIC at 08:16

## 2025-06-17 RX ADMIN — Medication 10 MG: at 20:59

## 2025-06-17 RX ADMIN — DICLOFENAC SODIUM 2 G: 10 GEL TOPICAL at 08:18

## 2025-06-17 RX ADMIN — ENOXAPARIN SODIUM 30 MG: 100 INJECTION SUBCUTANEOUS at 20:59

## 2025-06-17 RX ADMIN — CYANOCOBALAMIN TAB 500 MCG 1000 MCG: 500 TAB at 08:16

## 2025-06-17 RX ADMIN — OXYCODONE 5 MG: 5 TABLET ORAL at 15:18

## 2025-06-17 RX ADMIN — GABAPENTIN 300 MG: 300 CAPSULE ORAL at 08:16

## 2025-06-17 RX ADMIN — FERROUS SULFATE TAB EC 324 MG (65 MG FE EQUIVALENT) 324 MG: 324 (65 FE) TABLET DELAYED RESPONSE at 08:16

## 2025-06-17 RX ADMIN — Medication: at 21:00

## 2025-06-17 RX ADMIN — CARBOXYMETHYLCELLULOSE SODIUM 1 DROP: 0.5 SOLUTION/ DROPS OPHTHALMIC at 18:05

## 2025-06-17 RX ADMIN — ENOXAPARIN SODIUM 30 MG: 100 INJECTION SUBCUTANEOUS at 08:17

## 2025-06-17 RX ADMIN — DICLOFENAC SODIUM 2 G: 10 GEL TOPICAL at 21:01

## 2025-06-17 RX ADMIN — CARVEDILOL 12.5 MG: 12.5 TABLET, FILM COATED ORAL at 05:40

## 2025-06-17 ASSESSMENT — PAIN SCALES - GENERAL
PAINLEVEL_OUTOF10: 7
PAINLEVEL_OUTOF10: 5
PAINLEVEL_OUTOF10: 5

## 2025-06-17 ASSESSMENT — PAIN DESCRIPTION - DESCRIPTORS: DESCRIPTORS: BURNING;DISCOMFORT

## 2025-06-17 ASSESSMENT — PAIN DESCRIPTION - ORIENTATION
ORIENTATION: LEFT
ORIENTATION: RIGHT

## 2025-06-17 ASSESSMENT — PAIN DESCRIPTION - LOCATION
LOCATION: FOOT
LOCATION: HIP

## 2025-06-17 NOTE — FLOWSHEET NOTE
06/16/25 2115   Assessment   Charting Type Shift assessment   Psychosocial   Psychosocial (WDL) WDL   Neurological   Neuro (WDL) WDL   Kim Coma Scale   Eye Opening 4   Best Verbal Response 5   Best Motor Response 6   [place] Coma Scale Score 15   HEENT (Head, Ears, Eyes, Nose, & Throat)   HEENT (WDL) X   Right Eye Glasses   Left Eye Glasses   Teeth Dentures upper;Dentures lower   Respiratory   Respiratory (WDL) WDL   Cardiac   Cardiac (WDL) WDL   Gastrointestinal   Abdominal (WDL) X   Abdomen Inspection Rounded   Last BM (including prior to admit) 06/16/25   RUQ Bowel Sounds Active   LUQ Bowel Sounds Active   RLQ Bowel Sounds Active   LLQ Bowel Sounds Active   Genitourinary   Genitourinary (WDL) WDL   Urine Assessment   Urine Color Yellow/straw   Urine Appearance Clear   Peripheral Vascular   Peripheral Vascular (WDL) X   RLE Edema Trace   LLE Edema Trace   Anti-Embolism   Anti-Embolism Intervention Medication   Skin Integumentary    Skin Color Pale   Skin Condition/Temp Warm;Dry   Skin Integumentary (WDL) X   Musculoskeletal   RL Extremity Weakness;Unsteady   LL Extremity Weakness;Unsteady;Surgery   Musculoskeletal (WDL) X   Musculoskeletal Details   L Hip Surgery

## 2025-06-17 NOTE — PROGRESS NOTES
Physical Therapy  Facility/Department: [facility] MED SURG  Daily Treatment Note  NAME: Jose Palma  : 1945  MRN: [id number]    Date of Service: 2025    Discharge Recommendations:  Continue to assess pending progress, Home with assist PRN, Therapy recommended at discharge      Patient Diagnosis(es): There were no encounter diagnoses.    Assessment  Assessment: Patient completed bed mobility tasks with SBA. Stood from bedside with Mod A of 2 and cues for proper technique and safety. Transferred with RW and Mod A of 2 to the w/c. Lunch tray set up. Returned to patient's room after lunch to focus on transfers and gait. Patient has difficulty coming to stand due to fear, pain, and weakness. Stood with Mod A of 2 and ambulated 6, 4, 2, and 4 feet with RW, Mod A of 2, and w/c follow. Patient requires cues for upright posture in standing. Patient sat in w/c in front of the bathroom mirror for shaving. Patient very fatigued by the end of the session and requested to lie down. Stood with Mod A of 2 and transferred with RW to the bed. Patient has unsafe descent trying to sit too quickly and requires assistance from therapists to safely sit down on the bed. Min A required to get L leg into bed.  Activity Tolerance: Patient limited by fatigue;Patient limited by pain    Plan  Physical Therapy Plan  General Plan: 3-5 times per week  Days Per Week: 5 Days  Therapy Duration: 2 Weeks  Current Treatment Recommendations: Strengthening;ROM;Balance training;Functional mobility training;Transfer training;Endurance training;Pain management;Stair training;Gait training;Home exercise program;Safety education & training;Patient/Caregiver education & training;Equipment evaluation, education, & procurement;Therapeutic activities    Restrictions  Restrictions/Precautions  Restrictions/Precautions: General Precautions  Required Braces or Orthoses?: No     Subjective   Subjective  Subjective: Patient presents awake in bed and

## 2025-06-17 NOTE — PROGRESS NOTES
Occupational Therapy  Facility/Department: [facility] MED SURG  Daily Treatment Note  NAME: Jose Palma  : 1945  MRN: [id number]    Date of Service: 2025    Discharge Recommendations:  Continue to assess pending progress    Patient Diagnosis(es): There were no encounter diagnoses.     Assessment   Assessment: Pt received in bed on this date with spouse present in room. Pt transferred to sitting at EOB with CGA. Co tx with PTA. Pt transferred from EOB to WC with mod x2 to come to stand and complete pivot to WC with MOD x2 with max verbal and tactile cues for correcting posture and weight shifting. Pt AMOS with lunch tray and left room. Came back for BID treatment co tx with PTA. Pt agreeable to therapy services. Pt come to stand with mod x2 and ambulated 2-6 feet intervals x 4 trials. Pt had difficulty coming to stand requiring increased time and max verbal and tactile cues to correct posture and weight shifting. Pt reports pain with weight shifting techniques using RW. Pt self propelled in bathroom and completed grooming/shaving tasks with AMOS.  Activity Tolerance: Patient limited by fatigue;Patient limited by pain      Subjective   I was hoping I would do a lot better today girls.     Objective  Vitals     Bed Mobility Training  Bed Mobility Training: Yes  Overall Level of Assistance: Stand by assistance;Minimal assistance  Interventions: Verbal cues  Rolling: Stand by assistance  Supine to Sit: Stand by assistance  Scooting: Stand by assistance  Balance  Sitting: Intact  Standing: Impaired;With support  Transfer Training  Transfer Training: Yes  Overall Level of Assistance: Partial/Moderate assistance;2 Person assistance  Interventions: Verbal cues;Safety awareness training  Sit to Stand: Partial/Moderate assistance;2 Person assistance  Stand to Sit: Minimal assistance;2 Person assistance  Stand Pivot Transfers: Partial/Moderate assistance;2 Person assistance  Bed to Chair:  (with RW)       Safety

## 2025-06-17 NOTE — PLAN OF CARE
Problem: Discharge Planning  Goal: Discharge to home or other facility with appropriate resources  6/16/2025 2122 by Ricky Zafar RN  Outcome: Progressing  6/16/2025 1653 by Isadora Bran RN  Outcome: Progressing     Problem: Safety - Adult  Goal: Free from fall injury  6/16/2025 2122 by Ricky Zafar RN  Outcome: Progressing  6/16/2025 1653 by Isadora Bran RN  Outcome: Progressing     Problem: Skin/Tissue Integrity  Goal: Skin integrity remains intact  Description: 1.  Monitor for areas of redness and/or skin breakdown2.  Assess vascular access sites hourly3.  Every 4-6 hours minimum:  Change oxygen saturation probe site4.  Every 4-6 hours:  If on nasal continuous positive airway pressure, respiratory therapy assess nares and determine need for appliance change or resting period  6/16/2025 2122 by Ricky Zafar RN  Outcome: Progressing  6/16/2025 1653 by Isadora Bran RN  Outcome: Progressing     Problem: ABCDS Injury Assessment  Goal: Absence of physical injury  6/16/2025 2122 by Ricky Zafar RN  Outcome: Progressing  6/16/2025 1653 by Isadora Bran RN  Outcome: Progressing     Problem: Nutrition Deficit:  Goal: Optimize nutritional status  6/16/2025 2122 by Ricky Zafar RN  Outcome: Progressing  6/16/2025 1653 by Isadora Bran RN  Outcome: Progressing     Problem: Pain  Goal: Verbalizes/displays adequate comfort level or baseline comfort level  6/16/2025 1653 by Isadora Bran RN  Outcome: Progressing

## 2025-06-18 LAB
QUANTI TB1 MINUS NIL: 0 IU/ML
QUANTI TB2 MINUS NIL: 0 IU/ML
QUANTIFERON MITOGEN MINUS NIL: 9.96 IU/ML
QUANTIFERON NIL: 0.04 IU/ML
QUANTIFERON TB INTERPRETATION: NEGATIVE IU/ML

## 2025-06-18 PROCEDURE — 97116 GAIT TRAINING THERAPY: CPT

## 2025-06-18 PROCEDURE — 97530 THERAPEUTIC ACTIVITIES: CPT

## 2025-06-18 PROCEDURE — 6360000002 HC RX W HCPCS: Performed by: INTERNAL MEDICINE

## 2025-06-18 PROCEDURE — 1200000002 HC SEMI PRIVATE SWING BED

## 2025-06-18 PROCEDURE — 6370000000 HC RX 637 (ALT 250 FOR IP): Performed by: PHYSICIAN ASSISTANT

## 2025-06-18 PROCEDURE — 97803 MED NUTRITION INDIV SUBSEQ: CPT

## 2025-06-18 PROCEDURE — 97110 THERAPEUTIC EXERCISES: CPT

## 2025-06-18 PROCEDURE — 94761 N-INVAS EAR/PLS OXIMETRY MLT: CPT

## 2025-06-18 RX ORDER — METHOCARBAMOL 750 MG/1
750 TABLET, FILM COATED ORAL 3 TIMES DAILY
Status: DISCONTINUED | OUTPATIENT
Start: 2025-06-18 | End: 2025-06-20

## 2025-06-18 RX ADMIN — CARVEDILOL 12.5 MG: 12.5 TABLET, FILM COATED ORAL at 05:35

## 2025-06-18 RX ADMIN — CARVEDILOL 12.5 MG: 12.5 TABLET, FILM COATED ORAL at 17:28

## 2025-06-18 RX ADMIN — METHOCARBAMOL 750 MG: 750 TABLET ORAL at 21:13

## 2025-06-18 RX ADMIN — CILOSTAZOL 50 MG: 100 TABLET ORAL at 21:13

## 2025-06-18 RX ADMIN — CARBOXYMETHYLCELLULOSE SODIUM 1 DROP: 0.5 SOLUTION/ DROPS OPHTHALMIC at 08:18

## 2025-06-18 RX ADMIN — ENOXAPARIN SODIUM 30 MG: 100 INJECTION SUBCUTANEOUS at 08:17

## 2025-06-18 RX ADMIN — PANTOPRAZOLE SODIUM 40 MG: 40 TABLET, DELAYED RELEASE ORAL at 05:35

## 2025-06-18 RX ADMIN — ASPIRIN 81 MG: 81 TABLET, COATED ORAL at 08:18

## 2025-06-18 RX ADMIN — GABAPENTIN 300 MG: 300 CAPSULE ORAL at 08:18

## 2025-06-18 RX ADMIN — OXYCODONE 5 MG: 5 TABLET ORAL at 08:59

## 2025-06-18 RX ADMIN — ROSUVASTATIN 40 MG: 20 TABLET, FILM COATED ORAL at 17:29

## 2025-06-18 RX ADMIN — CARBOXYMETHYLCELLULOSE SODIUM 1 DROP: 0.5 SOLUTION/ DROPS OPHTHALMIC at 17:29

## 2025-06-18 RX ADMIN — CILOSTAZOL 50 MG: 100 TABLET ORAL at 08:18

## 2025-06-18 RX ADMIN — DICLOFENAC SODIUM 2 G: 10 GEL TOPICAL at 21:15

## 2025-06-18 RX ADMIN — FOLIC ACID 1 MG: 1 TABLET ORAL at 08:18

## 2025-06-18 RX ADMIN — CARBOXYMETHYLCELLULOSE SODIUM 1 DROP: 0.5 SOLUTION/ DROPS OPHTHALMIC at 21:13

## 2025-06-18 RX ADMIN — ENOXAPARIN SODIUM 30 MG: 100 INJECTION SUBCUTANEOUS at 21:14

## 2025-06-18 RX ADMIN — CARBOXYMETHYLCELLULOSE SODIUM 1 DROP: 0.5 SOLUTION/ DROPS OPHTHALMIC at 13:08

## 2025-06-18 RX ADMIN — FERROUS SULFATE TAB EC 324 MG (65 MG FE EQUIVALENT) 324 MG: 324 (65 FE) TABLET DELAYED RESPONSE at 08:18

## 2025-06-18 RX ADMIN — GABAPENTIN 300 MG: 300 CAPSULE ORAL at 21:13

## 2025-06-18 RX ADMIN — CYANOCOBALAMIN TAB 500 MCG 1000 MCG: 500 TAB at 08:18

## 2025-06-18 RX ADMIN — OXYCODONE 5 MG: 5 TABLET ORAL at 21:12

## 2025-06-18 RX ADMIN — Medication 10 MG: at 21:13

## 2025-06-18 RX ADMIN — Medication 1 CAPSULE: at 08:18

## 2025-06-18 RX ADMIN — DICLOFENAC SODIUM 2 G: 10 GEL TOPICAL at 08:26

## 2025-06-18 RX ADMIN — METHOCARBAMOL 750 MG: 750 TABLET ORAL at 13:05

## 2025-06-18 RX ADMIN — Medication: at 21:14

## 2025-06-18 ASSESSMENT — PAIN DESCRIPTION - ORIENTATION
ORIENTATION: LEFT
ORIENTATION: LEFT

## 2025-06-18 ASSESSMENT — PAIN SCALES - GENERAL
PAINLEVEL_OUTOF10: 6
PAINLEVEL_OUTOF10: 5

## 2025-06-18 ASSESSMENT — PAIN DESCRIPTION - LOCATION
LOCATION: HIP
LOCATION: LEG

## 2025-06-18 NOTE — PROGRESS NOTES
Comprehensive Nutrition Assessment    Type and Reason for Visit:   (Follow-up)    Nutrition Recommendations/Plan:   Continue current diet as medically appropriate and tolerated.   Continue to encourage PO intakes as appropriate. Avg of 40-64% x 9 meals.   Continue Cornelius and Ensure Plus BID.    RD to follow patient and available PRN.      Malnutrition Assessment:  Malnutrition Status:  No malnutrition (06/09/25 1141)    Context:        Findings of the 6 clinical characteristics of malnutrition:  Energy Intake:     Weight Loss:        Body Fat Loss:        Muscle Mass Loss:       Fluid Accumulation:        Strength:       Nutrition Assessment:    Patient continues to be at moderate risk for ongoing nutritional compromise r/t fair intakes and weight gain.    Nutrition Related Findings:    Weight gain of 18 lb in one week, different scales used (bedside scale vs bedscale). Trace edema BLE. Medications: ferrous sulfate, folic acid, vitamin D, lactobacillus, B12, lasix (held). Labs: K+ 3.3, BUN 44, Cr 2.1, GFR 32, alb 3.1. Intakes slightly decreased since last follow-up Wound Type: Deep Tissue Injury, Surgical Incision (DTI right heel, sx incision left hip)       Current Nutrition Intake & Therapies:    Average Meal Intake: 26-50%, 51-75% (40-64% x 9)  Average Supplements Intake: 51-75%  ADULT DIET; Regular  ADULT ORAL NUTRITION SUPPLEMENT; Lunch, Dinner; Wound Healing Oral Supplement  ADULT ORAL NUTRITION SUPPLEMENT; Breakfast, Lunch; Standard High Calorie/High Protein Oral Supplement    Anthropometric Measures:  Height: 182.9 cm (6')  Ideal Body Weight (IBW): 178 lbs (81 kg)       Current Body Weight: 114.8 kg (253 lb), 142.1 % IBW. Weight Source: Other (Bedside scale)  Current BMI (kg/m2): 34.3           Weight Adjustment For: No Adjustment                 BMI Categories: Obese Class 1 (BMI 30.0-34.9)    Estimated Daily Nutrient Needs:  Energy Requirements Based On: Formula  Weight Used for Energy Requirements:

## 2025-06-18 NOTE — PLAN OF CARE
Problem: Discharge Planning  Goal: Discharge to home or other facility with appropriate resources  Outcome: Progressing     Problem: Safety - Adult  Goal: Free from fall injury  Outcome: Progressing  Flowsheets (Taken 6/18/2025 9512)  Free From Fall Injury: Instruct family/caregiver on patient safety

## 2025-06-18 NOTE — PROGRESS NOTES
Physical Therapy  Facility/Department: [facility] MED SURG  Daily Treatment Note  NAME: Jose Palma  : 1945  MRN: [id number]    Date of Service: 2025    Discharge Recommendations:  Continue to assess pending progress, Home with assist PRN, Therapy recommended at discharge      Patient Diagnosis(es): There were no encounter diagnoses.    Assessment  Assessment: Patient transitioned supine to sit with SBA. Engaged patient in B LE therex in sitting with patient being able to lift his L leg today without using a pillow case to slide it. Stood with Mod A of 2 and transferred with RW to the w/c. Ambulated 5, 2, and 2 feet with RW, Mod A of 2, and w/c follow. Patient continues to c/o pain and has difficulty standing and shifting weight. Donned L knee brace per his request, but it did not seem to have any positive effect. Patient propelled w/c in the hallway for strengthening and further AROM for L LE. Worked on sit to stands up to the Yamini Steady to focus on proper technique and upright posture.  Activity Tolerance: Patient limited by fatigue;Patient limited by pain    Plan  Physical Therapy Plan  General Plan: 3-5 times per week  Days Per Week: 5 Days  Therapy Duration: 2 Weeks  Current Treatment Recommendations: Strengthening;ROM;Balance training;Functional mobility training;Transfer training;Endurance training;Pain management;Stair training;Gait training;Home exercise program;Safety education & training;Patient/Caregiver education & training;Equipment evaluation, education, & procurement;Therapeutic activities    Restrictions  Restrictions/Precautions  Restrictions/Precautions: General Precautions  Required Braces or Orthoses?: No     Subjective   Subjective  Subjective: Patient presents awake in bed. States his L hip and thigh are hurting but not as much as yesterday.    Objective  Bed Mobility Training  Bed Mobility Training: Yes  Overall Level of Assistance: Stand by assistance;Minimal

## 2025-06-18 NOTE — PROGRESS NOTES
Occupational Therapy  Facility/Department: [facility] MED SURG  Daily Treatment Note  NAME: Jose Palma  : 1945  MRN: [id number]    Date of Service: 2025    Discharge Recommendations:  Continue to assess pending progress     Patient Diagnosis(es): There were no encounter diagnoses.     Assessment   Assessment: Pt received in bed on this date. Co tx with PTA. Pt is agreeable to therapy services. Pt transferred from supine to sitting with stand by assist. Pt sat unsupported at EOB. Pt had pain medication prior to entry. Pt continues to report pain with weight bearing during transfers and weight shifting. Pt continues to have significant difficulty coming to stand and weight shifting. Pt transferred from bed to  with MIN x2 to come to stand from elevated bed and transferred with MOD x2 to complete pivot transfer. Pt completed sit to stand with MOD x2 and demo ability to ambulate 5 feet with WC follow. Pt completed task x3 additional trials ambulation ~2-3 feet each trial. Pt completed WC mobility using BUE and BLE to self propel. Patient attempted sit to stand using railing to pull to stand and wasn't able to come to erect standing position despite MOD x2. Pt transported to room in  and completed x10 sit to stands in kamlesh steady focusing on improving posture. Pt continues to present with forward flexed posture in standing requiring mod tactile and verbal cuing to correct. Pt fatigued during task required rest break. Pt completed x5 in second set of sit to stand transfers. Pt transferred to sitting with CGA x2 from kamlesh steady and positioned in chair with call light in reach upon exit.  Activity Tolerance: Patient limited by fatigue;Patient limited by pain    Subjective   Pt agreeable to therapy services.     Objective  Vitals     Bed Mobility Training  Bed Mobility Training: Yes  Overall Level of Assistance: Stand by assistance;Minimal assistance  Interventions: Verbal cues  Rolling: Stand by

## 2025-06-18 NOTE — PLAN OF CARE
Problem: Discharge Planning  Goal: Discharge to home or other facility with appropriate resources  6/18/2025 0045 by Lonny Suero RN  Outcome: Progressing  6/17/2025 1814 by Isadora Bran RN  Outcome: Progressing     Problem: Safety - Adult  Goal: Free from fall injury  6/18/2025 0045 by Lonny Suero RN  Outcome: Progressing  6/17/2025 1814 by Isadora Bran RN  Outcome: Progressing     Problem: Skin/Tissue Integrity  Goal: Skin integrity remains intact  Description: 1.  Monitor for areas of redness and/or skin breakdown2.  Assess vascular access sites hourly3.  Every 4-6 hours minimum:  Change oxygen saturation probe site4.  Every 4-6 hours:  If on nasal continuous positive airway pressure, respiratory therapy assess nares and determine need for appliance change or resting period  6/18/2025 0045 by Lonny Suero RN  Outcome: Progressing  6/17/2025 1814 by Isadora Bran RN  Outcome: Progressing     Problem: ABCDS Injury Assessment  Goal: Absence of physical injury  6/17/2025 1814 by Isadora Bran RN  Outcome: Progressing     Problem: Nutrition Deficit:  Goal: Optimize nutritional status  6/17/2025 1814 by Isadora Bran RN  Outcome: Progressing     Problem: Pain  Goal: Verbalizes/displays adequate comfort level or baseline comfort level  6/17/2025 1814 by Isadora Bran RN  Outcome: Progressing

## 2025-06-19 LAB
ALBUMIN SERPL-MCNC: 3 G/DL (ref 3.4–4.8)
ALBUMIN/GLOB SERPL: 1 {RATIO} (ref 0.8–2)
ALP SERPL-CCNC: 98 U/L (ref 25–100)
ALT SERPL-CCNC: 8 U/L (ref 4–36)
ANION GAP SERPL CALCULATED.3IONS-SCNC: 10 MMOL/L (ref 3–16)
AST SERPL-CCNC: 14 U/L (ref 8–33)
BILIRUB SERPL-MCNC: 0.3 MG/DL (ref 0.3–1.2)
BUN SERPL-MCNC: 38 MG/DL (ref 6–20)
CALCIUM SERPL-MCNC: 8.7 MG/DL (ref 8.3–10.6)
CHLORIDE SERPL-SCNC: 105 MMOL/L (ref 98–107)
CO2 SERPL-SCNC: 24 MMOL/L (ref 20–30)
CREAT SERPL-MCNC: 1.8 MG/DL (ref 0.8–1.3)
ERYTHROCYTE [DISTWIDTH] IN BLOOD BY AUTOMATED COUNT: 14.6 % (ref 11–16)
GFR SERPLBLD CREATININE-BSD FMLA CKD-EPI: 38 ML/MIN/{1.73_M2}
GLOBULIN SER CALC-MCNC: 2.9 G/DL
GLUCOSE SERPL-MCNC: 89 MG/DL (ref 74–106)
HCT VFR BLD AUTO: 26.3 % (ref 40–54)
HGB BLD-MCNC: 8.2 G/DL (ref 13–18)
MAGNESIUM SERPL-MCNC: 2.2 MG/DL (ref 1.7–2.4)
MCH RBC QN AUTO: 29.9 PG (ref 27–32)
MCHC RBC AUTO-ENTMCNC: 31.2 G/DL (ref 31–35)
MCV RBC AUTO: 96 FL (ref 80–100)
PLATELET # BLD AUTO: 282 K/UL (ref 150–400)
PMV BLD AUTO: 10.1 FL (ref 6–10)
POTASSIUM SERPL-SCNC: 3.6 MMOL/L (ref 3.4–5.1)
PROT SERPL-MCNC: 5.9 G/DL (ref 6.4–8.3)
RBC # BLD AUTO: 2.74 M/UL (ref 4.5–6)
SODIUM SERPL-SCNC: 139 MMOL/L (ref 136–145)
WBC # BLD AUTO: 8.5 K/UL (ref 4–11)

## 2025-06-19 PROCEDURE — 83735 ASSAY OF MAGNESIUM: CPT

## 2025-06-19 PROCEDURE — 94761 N-INVAS EAR/PLS OXIMETRY MLT: CPT

## 2025-06-19 PROCEDURE — 80053 COMPREHEN METABOLIC PANEL: CPT

## 2025-06-19 PROCEDURE — 97535 SELF CARE MNGMENT TRAINING: CPT

## 2025-06-19 PROCEDURE — 97116 GAIT TRAINING THERAPY: CPT

## 2025-06-19 PROCEDURE — 97530 THERAPEUTIC ACTIVITIES: CPT

## 2025-06-19 PROCEDURE — 85027 COMPLETE CBC AUTOMATED: CPT

## 2025-06-19 PROCEDURE — 36415 COLL VENOUS BLD VENIPUNCTURE: CPT

## 2025-06-19 PROCEDURE — 1200000002 HC SEMI PRIVATE SWING BED

## 2025-06-19 PROCEDURE — 6370000000 HC RX 637 (ALT 250 FOR IP): Performed by: PHYSICIAN ASSISTANT

## 2025-06-19 PROCEDURE — 6360000002 HC RX W HCPCS: Performed by: INTERNAL MEDICINE

## 2025-06-19 PROCEDURE — 6360000002 HC RX W HCPCS: Performed by: PHYSICIAN ASSISTANT

## 2025-06-19 RX ADMIN — CARVEDILOL 12.5 MG: 12.5 TABLET, FILM COATED ORAL at 17:48

## 2025-06-19 RX ADMIN — CARBOXYMETHYLCELLULOSE SODIUM 1 DROP: 0.5 SOLUTION/ DROPS OPHTHALMIC at 21:34

## 2025-06-19 RX ADMIN — FERROUS SULFATE TAB EC 324 MG (65 MG FE EQUIVALENT) 324 MG: 324 (65 FE) TABLET DELAYED RESPONSE at 08:21

## 2025-06-19 RX ADMIN — PANTOPRAZOLE SODIUM 40 MG: 40 TABLET, DELAYED RELEASE ORAL at 06:07

## 2025-06-19 RX ADMIN — GABAPENTIN 300 MG: 300 CAPSULE ORAL at 08:21

## 2025-06-19 RX ADMIN — CARBOXYMETHYLCELLULOSE SODIUM 1 DROP: 0.5 SOLUTION/ DROPS OPHTHALMIC at 08:22

## 2025-06-19 RX ADMIN — CILOSTAZOL 50 MG: 100 TABLET ORAL at 08:21

## 2025-06-19 RX ADMIN — CYANOCOBALAMIN TAB 500 MCG 1000 MCG: 500 TAB at 08:21

## 2025-06-19 RX ADMIN — DICLOFENAC SODIUM 2 G: 10 GEL TOPICAL at 08:25

## 2025-06-19 RX ADMIN — ENOXAPARIN SODIUM 30 MG: 100 INJECTION SUBCUTANEOUS at 21:33

## 2025-06-19 RX ADMIN — CARVEDILOL 12.5 MG: 12.5 TABLET, FILM COATED ORAL at 06:07

## 2025-06-19 RX ADMIN — GABAPENTIN 300 MG: 300 CAPSULE ORAL at 21:33

## 2025-06-19 RX ADMIN — CARBOXYMETHYLCELLULOSE SODIUM 1 DROP: 0.5 SOLUTION/ DROPS OPHTHALMIC at 17:52

## 2025-06-19 RX ADMIN — METHOCARBAMOL 750 MG: 750 TABLET ORAL at 08:21

## 2025-06-19 RX ADMIN — OXYCODONE 5 MG: 5 TABLET ORAL at 10:18

## 2025-06-19 RX ADMIN — DICLOFENAC SODIUM 2 G: 10 GEL TOPICAL at 21:45

## 2025-06-19 RX ADMIN — OXYCODONE 5 MG: 5 TABLET ORAL at 21:35

## 2025-06-19 RX ADMIN — ASPIRIN 81 MG: 81 TABLET, COATED ORAL at 08:21

## 2025-06-19 RX ADMIN — Medication: at 21:34

## 2025-06-19 RX ADMIN — FOLIC ACID 1 MG: 1 TABLET ORAL at 08:21

## 2025-06-19 RX ADMIN — ROSUVASTATIN 40 MG: 20 TABLET, FILM COATED ORAL at 17:48

## 2025-06-19 RX ADMIN — ENOXAPARIN SODIUM 30 MG: 100 INJECTION SUBCUTANEOUS at 08:20

## 2025-06-19 RX ADMIN — CILOSTAZOL 50 MG: 100 TABLET ORAL at 21:33

## 2025-06-19 RX ADMIN — CARBOXYMETHYLCELLULOSE SODIUM 1 DROP: 0.5 SOLUTION/ DROPS OPHTHALMIC at 14:14

## 2025-06-19 RX ADMIN — METHOCARBAMOL 750 MG: 750 TABLET ORAL at 21:33

## 2025-06-19 RX ADMIN — METHOCARBAMOL 750 MG: 750 TABLET ORAL at 14:14

## 2025-06-19 RX ADMIN — Medication 1 CAPSULE: at 08:21

## 2025-06-19 RX ADMIN — EPOETIN ALFA-EPBX 10000 UNITS: 10000 INJECTION, SOLUTION INTRAVENOUS; SUBCUTANEOUS at 17:52

## 2025-06-19 ASSESSMENT — PAIN DESCRIPTION - ORIENTATION: ORIENTATION: LEFT

## 2025-06-19 ASSESSMENT — PAIN SCALES - GENERAL
PAINLEVEL_OUTOF10: 7
PAINLEVEL_OUTOF10: 8
PAINLEVEL_OUTOF10: 5

## 2025-06-19 ASSESSMENT — PAIN DESCRIPTION - LOCATION: LOCATION: HIP;LEG

## 2025-06-19 NOTE — PLAN OF CARE
Problem: Discharge Planning  Goal: Discharge to home or other facility with appropriate resources  6/19/2025 0116 by Kenisha Salinas RN  Outcome: Progressing  Flowsheets (Taken 6/18/2025 2042)  Discharge to home or other facility with appropriate resources:   Identify barriers to discharge with patient and caregiver   Refer to discharge planning if patient needs post-hospital services based on physician order or complex needs related to functional status, cognitive ability or social support system  6/18/2025 1753 by Anuja Sinha RN  Outcome: Progressing     Problem: Safety - Adult  Goal: Free from fall injury  6/19/2025 0116 by Kenisha Salinas RN  Outcome: Progressing  6/18/2025 1753 by Anuja Sinha RN  Outcome: Progressing  Flowsheets (Taken 6/18/2025 1753)  Free From Fall Injury: Instruct family/caregiver on patient safety     Problem: Skin/Tissue Integrity  Goal: Skin integrity remains intact  Description: 1.  Monitor for areas of redness and/or skin breakdown2.  Assess vascular access sites hourly3.  Every 4-6 hours minimum:  Change oxygen saturation probe site4.  Every 4-6 hours:  If on nasal continuous positive airway pressure, respiratory therapy assess nares and determine need for appliance change or resting period  Outcome: Progressing  Flowsheets (Taken 6/18/2025 2042)  Skin Integrity Remains Intact: Monitor for areas of redness and/or skin breakdown     Problem: ABCDS Injury Assessment  Goal: Absence of physical injury  Outcome: Progressing     Problem: Nutrition Deficit:  Goal: Optimize nutritional status  Outcome: Progressing  Flowsheets (Taken 6/18/2025 1543 by Delmy Henry, KAPIL)  Nutrient intake appropriate for improving, restoring, or maintaining nutritional needs: Monitor oral intake, labs, and treatment plans     Problem: Pain  Goal: Verbalizes/displays adequate comfort level or baseline comfort level  Outcome: Progressing

## 2025-06-19 NOTE — PLAN OF CARE
Problem: Discharge Planning  Goal: Discharge to home or other facility with appropriate resources  6/19/2025 0122 by Kenisha Salinas RN  Outcome: Progressing  6/19/2025 0116 by Kenisha Salinas RN  Outcome: Progressing  Flowsheets (Taken 6/18/2025 2042)  Discharge to home or other facility with appropriate resources:   Identify barriers to discharge with patient and caregiver   Refer to discharge planning if patient needs post-hospital services based on physician order or complex needs related to functional status, cognitive ability or social support system  6/18/2025 1753 by Anuja Sinha RN  Outcome: Progressing     Problem: Safety - Adult  Goal: Free from fall injury  6/19/2025 0122 by Kenisha Salinas RN  Outcome: Progressing  6/19/2025 0116 by Kenisha Salinas RN  Outcome: Progressing  6/18/2025 1753 by Anuja Sinha RN  Outcome: Progressing  Flowsheets (Taken 6/18/2025 1753)  Free From Fall Injury: Instruct family/caregiver on patient safety     Problem: Skin/Tissue Integrity  Goal: Skin integrity remains intact  Description: 1.  Monitor for areas of redness and/or skin breakdown2.  Assess vascular access sites hourly3.  Every 4-6 hours minimum:  Change oxygen saturation probe site4.  Every 4-6 hours:  If on nasal continuous positive airway pressure, respiratory therapy assess nares and determine need for appliance change or resting period  6/19/2025 0122 by Kenisha Salinas RN  Outcome: Progressing  6/19/2025 0116 by Kenisha Salinas RN  Outcome: Progressing  Flowsheets (Taken 6/18/2025 2042)  Skin Integrity Remains Intact: Monitor for areas of redness and/or skin breakdown     Problem: ABCDS Injury Assessment  Goal: Absence of physical injury  6/19/2025 0122 by Kenisha Salinas RN  Outcome: Progressing  6/19/2025 0116 by Kenisha Salinas RN  Outcome: Progressing     Problem: Nutrition Deficit:  Goal: Optimize nutritional status  6/19/2025 0122 by Kenisha Salinas RN  Outcome: Progressing  6/19/2025 0116 by

## 2025-06-19 NOTE — PROGRESS NOTES
Physical Therapy  Facility/Department: [facility] MED SURG  Daily Treatment Note  NAME: Jose Palma  : 1945  MRN: [id number]    Date of Service: 2025    Discharge Recommendations:  Continue to assess pending progress, Home with assist PRN, Therapy recommended at discharge      Patient Diagnosis(es): There were no encounter diagnoses.    Assessment  Assessment: Patient up in w/c finishing sponge bathing and personal care tasks with OT assistance. Patient stands from the w/c with Mod A of 2 and cues for upright posture. Stood up to Yamini Steady for transport to the bathroom. Required Max a for hygiene and clothing management. Patient left up in w/c with lunch tray set up. Returned to patient's room in the afternoon to further address transfers and gait. Patient stood with Mod A of 2. Able to ambulate 11 feet and 13 feet with RW, Min/Mod A of 2, and w/c follow. Patient very fatigued by the end of the session. Attempted transfer from w/c back to bed using bedrail, but patient was unable. Transferred patient to bed via Yamini Qinec. Min A required for patient to get his left leg into bed.  Activity Tolerance: Patient tolerated treatment well;Patient limited by pain;Patient limited by endurance    Plan  Physical Therapy Plan  General Plan: 3-5 times per week  Days Per Week: 5 Days  Therapy Duration: 2 Weeks  Current Treatment Recommendations: Strengthening;ROM;Balance training;Functional mobility training;Transfer training;Endurance training;Pain management;Stair training;Gait training;Home exercise program;Safety education & training;Patient/Caregiver education & training;Equipment evaluation, education, & procurement;Therapeutic activities    Restrictions  Restrictions/Precautions  Restrictions/Precautions: General Precautions  Required Braces or Orthoses?: No     Subjective   Subjective  Subjective: Patient presents up in w/c finishing sponge bathing and personal care tasks with OT assistance.    Objective

## 2025-06-19 NOTE — FLOWSHEET NOTE
06/19/25 0825   Assessment   Charting Type Shift assessment   Psychosocial   Psychosocial (WDL) WDL   Neurological   Neuro (WDL) WDL   Kim Coma Scale   Eye Opening 4   Best Verbal Response 5   Best Motor Response 6   [place] Coma Scale Score 15   HEENT (Head, Ears, Eyes, Nose, & Throat)   HEENT (WDL) X   Right Eye Glasses   Left Eye Glasses   Teeth Dentures upper;Dentures lower   Respiratory   Respiratory (WDL) WDL   Cardiac   Cardiac (WDL) WDL   Gastrointestinal   Abdominal (WDL) X   Abdomen Inspection Rounded   Last BM (including prior to admit) 06/17/25   RUQ Bowel Sounds Active   LUQ Bowel Sounds Active   RLQ Bowel Sounds Active   LLQ Bowel Sounds Active   Genitourinary   Genitourinary (WDL) WDL   Urine Assessment   Urinary Status Voiding   Peripheral Vascular   Peripheral Vascular (WDL) X   RLE Edema Trace   LLE Edema Trace   Anti-Embolism   Anti-Embolism Intervention Medication   Skin Integumentary    Skin Color Pale   Skin Condition/Temp Warm;Dry   Skin Integumentary (WDL) X   Skin Integrity Site 2   Skin Integrity Location 2 Incision (see LDA)   Location 2 left hip   Preventative Dressing No   Skin Integrity Site 4   Skin Integrity Location 4 Wound (see LDA)   Location 4 right heel   Preventative Dressing Yes   Care Plan - Skin/Tissue Integrity Goals   Skin Integrity Remains Intact Monitor for areas of redness and/or skin breakdown   Musculoskeletal   RL Extremity Weakness;Unsteady   LL Extremity Weakness;Unsteady   Musculoskeletal (WDL) X   Musculoskeletal Details   L Hip Surgery   Wound 06/17/25 Heel Right   Date First Assessed/Time First Assessed: 06/17/25 0800   Primary Wound Type: Pressure Injury  Location: Heel  Wound Location Orientation: Right   Wound Etiology Deep tissue/Injury   Dressing Status Clean;Dry;Intact   Wound 06/13/25 Hip Left;Proximal;Upper   Date First Assessed: 06/13/25   Present on Original Admission: Yes  Location: Hip  Wound Location Orientation: Left;Proximal;Upper    Dressing/Treatment Open to air   Wound 06/13/25 Hip Left;Lower   Date First Assessed: 06/13/25   Present on Original Admission: Yes  Location: Hip  Wound Location Orientation: Left;Lower   Dressing/Treatment Open to air   Care Plan - Discharge Planning Goals   Discharge to home or other facility with appropriate resources Identify barriers to discharge with patient and caregiver

## 2025-06-19 NOTE — PROGRESS NOTES
Occupational Therapy  Facility/Department: [facility] MED SURG  Daily Treatment Note  NAME: Jose Palma  : 1945  MRN: [id number]    Date of Service: 2025    Discharge Recommendations:  Continue to assess pending progress     Patient Diagnosis(es): There were no encounter diagnoses.     Assessment   Assessment: Pt received in bed on this date. Pt participated in bridge exercises x10, patient completed hip ADD/ABD, hip flexion, knee flexion x10 reps each with AAROM to support LLE. Pt tolerated well. Pt completed in prep of mobility to decrease pain and stiffness. Pt transferred from supine to sitting with MOD I. Pt sat unsupported at EOB. Pt come to stand with bed elevated with MOD x2. Pt took 5 steps with chair follow with mod x2. Pt transferred to sitting. Pt completed sponge bathing seated in chair. Pt washed UB with AMOS and LB with min asist. Pt required assist to thoroughly cleanse joyce area and BLE feet. Pt provided with reacher, sock aid, etc to assist with dressing tasks. Pt donned shirt with AMOS. Pt required MOD A to don underwear and pants with increased time and max verbal cuing to complete task. Patient requires MOD x2 to come to stand with difficulty weight shifting to place BUE on walker. Pt transferred to bathroom via kamlesh steady and toileted with max assist. Pt completed joyce hygiene seated but had difficulty with thoroughness requiring assist to complete task. Pt required max assist to complete LB clothing management. Pt transported to  and  with lunch tray. BID session began at 1:51 co tx with PTA. Pt come to stand with MOD x2 with increased difficulty coming to erect standing position. Patient ambulated 11 feet had rest break in transferring to sitting position with min x2 assist. Pt come to stand on second trial with MOD x2 and ambulated with RW MOD x2 assist x13 feet. Pt transferred to sitting and self propelled back to room secondary to fatigue. Pt requested to return to bed at

## 2025-06-19 NOTE — FLOWSHEET NOTE
06/18/25 2042   Assessment   Charting Type Shift assessment   Psychosocial   Psychosocial (WDL) WDL   Neurological   Neuro (WDL) WDL   Kim Coma Scale   Eye Opening 4   Best Verbal Response 5   Best Motor Response 6   [place] Coma Scale Score 15   NIH Stroke Scale   NIH Stroke Scale Assessed No   HEENT (Head, Ears, Eyes, Nose, & Throat)   HEENT (WDL) X   Right Eye Glasses   Left Eye Glasses   Teeth Dentures upper;Dentures lower   Respiratory   Respiratory (WDL) WDL   Cardiac   Cardiac (WDL) WDL   Gastrointestinal   Abdominal (WDL) X   Abdomen Inspection Rounded   Last BM (including prior to admit) 06/17/25   RUQ Bowel Sounds Active   LUQ Bowel Sounds Active   RLQ Bowel Sounds Active   LLQ Bowel Sounds Active   Genitourinary   Genitourinary (WDL) WDL   Urine Assessment   Urinary Status Voiding   Urine Color Yellow/straw   Peripheral Vascular   Peripheral Vascular (WDL) X   RLE Edema Trace   LLE Edema Trace   Anti-Embolism   Anti-Embolism Intervention Medication   Skin Integumentary    Skin Color Pale   Skin Condition/Temp Warm;Dry   Skin Integrity Abrasion   Location LUE   Skin Integumentary (WDL) X   Skin Integrity Site 2   Skin Integrity Location 2 Incision (see LDA)   Location 2 lt hip   Preventative Dressing No   Assessed this shift Yes   Skin Integrity Site 4   Skin Integrity Location 4 Wound (see LDA)   Location 4 rt heel   Preventative Dressing Yes   Care Plan - Skin/Tissue Integrity Goals   Skin Integrity Remains Intact Monitor for areas of redness and/or skin breakdown   Musculoskeletal   RL Extremity Weakness;Unsteady   LL Extremity Weakness;Unsteady   Musculoskeletal (WDL) X   Musculoskeletal Details   L Hip Surgery   Wound 06/17/25 Heel Right   Date First Assessed/Time First Assessed: 06/17/25 0800   Primary Wound Type: Pressure Injury  Location: Heel  Wound Location Orientation: Right   Wound Etiology Deep tissue/Injury   Dressing Status Clean;Dry;Intact   Wound 06/13/25 Hip Left;Proximal;Upper

## 2025-06-19 NOTE — PLAN OF CARE
Problem: Discharge Planning  Goal: Discharge to home or other facility with appropriate resources  6/19/2025 0959 by Melida Moody RN  Outcome: Progressing  Flowsheets (Taken 6/19/2025 0825)  Discharge to home or other facility with appropriate resources: Identify barriers to discharge with patient and caregiver  6/19/2025 0122 by Kenisha Salinas RN  Outcome: Progressing  6/19/2025 0116 by Kenisha Salinas RN  Outcome: Progressing  Flowsheets (Taken 6/18/2025 2042)  Discharge to home or other facility with appropriate resources:   Identify barriers to discharge with patient and caregiver   Refer to discharge planning if patient needs post-hospital services based on physician order or complex needs related to functional status, cognitive ability or social support system     Problem: Safety - Adult  Goal: Free from fall injury  6/19/2025 0959 by Melida Moody RN  Outcome: Progressing  Flowsheets (Taken 6/19/2025 0959)  Free From Fall Injury: Instruct family/caregiver on patient safety  6/19/2025 0122 by Kenisha Salinas RN  Outcome: Progressing  6/19/2025 0116 by Kenisha Salinas RN  Outcome: Progressing     Problem: Skin/Tissue Integrity  Goal: Skin integrity remains intact  Description: 1.  Monitor for areas of redness and/or skin breakdown2.  Assess vascular access sites hourly3.  Every 4-6 hours minimum:  Change oxygen saturation probe site4.  Every 4-6 hours:  If on nasal continuous positive airway pressure, respiratory therapy assess nares and determine need for appliance change or resting period  6/19/2025 0959 by Melida Moody RN  Outcome: Progressing  Flowsheets  Taken 6/19/2025 0959  Skin Integrity Remains Intact: Monitor for areas of redness and/or skin breakdown  Taken 6/19/2025 0825  Skin Integrity Remains Intact: Monitor for areas of redness and/or skin breakdown  6/19/2025 0122 by Kenisha Salinas RN  Outcome: Progressing  6/19/2025 0116 by Kenisha Salinas RN  Outcome:

## 2025-06-20 ENCOUNTER — APPOINTMENT (OUTPATIENT)
Dept: GENERAL RADIOLOGY | Facility: HOSPITAL | Age: 80
DRG: 862 | End: 2025-06-20
Attending: INTERNAL MEDICINE
Payer: MEDICARE

## 2025-06-20 PROCEDURE — 6370000000 HC RX 637 (ALT 250 FOR IP): Performed by: PHYSICIAN ASSISTANT

## 2025-06-20 PROCEDURE — 97530 THERAPEUTIC ACTIVITIES: CPT

## 2025-06-20 PROCEDURE — 73502 X-RAY EXAM HIP UNI 2-3 VIEWS: CPT

## 2025-06-20 PROCEDURE — 94761 N-INVAS EAR/PLS OXIMETRY MLT: CPT

## 2025-06-20 PROCEDURE — 1200000002 HC SEMI PRIVATE SWING BED

## 2025-06-20 PROCEDURE — 97116 GAIT TRAINING THERAPY: CPT

## 2025-06-20 PROCEDURE — 97110 THERAPEUTIC EXERCISES: CPT

## 2025-06-20 PROCEDURE — 6360000002 HC RX W HCPCS: Performed by: INTERNAL MEDICINE

## 2025-06-20 RX ORDER — METHOCARBAMOL 500 MG/1
1000 TABLET, FILM COATED ORAL 3 TIMES DAILY
Status: DISCONTINUED | OUTPATIENT
Start: 2025-06-20 | End: 2025-06-21 | Stop reason: HOSPADM

## 2025-06-20 RX ORDER — GABAPENTIN 300 MG/1
300 CAPSULE ORAL 3 TIMES DAILY
Status: DISCONTINUED | OUTPATIENT
Start: 2025-06-20 | End: 2025-06-21 | Stop reason: HOSPADM

## 2025-06-20 RX ADMIN — METHOCARBAMOL 750 MG: 750 TABLET ORAL at 08:59

## 2025-06-20 RX ADMIN — FOLIC ACID 1 MG: 1 TABLET ORAL at 08:58

## 2025-06-20 RX ADMIN — DICLOFENAC SODIUM 2 G: 10 GEL TOPICAL at 21:23

## 2025-06-20 RX ADMIN — DICLOFENAC SODIUM 2 G: 10 GEL TOPICAL at 09:01

## 2025-06-20 RX ADMIN — ENOXAPARIN SODIUM 30 MG: 100 INJECTION SUBCUTANEOUS at 21:19

## 2025-06-20 RX ADMIN — CYANOCOBALAMIN TAB 500 MCG 1000 MCG: 500 TAB at 08:58

## 2025-06-20 RX ADMIN — METHOCARBAMOL 1000 MG: 500 TABLET ORAL at 21:20

## 2025-06-20 RX ADMIN — CARBOXYMETHYLCELLULOSE SODIUM 1 DROP: 0.5 SOLUTION/ DROPS OPHTHALMIC at 21:20

## 2025-06-20 RX ADMIN — CARVEDILOL 12.5 MG: 12.5 TABLET, FILM COATED ORAL at 17:14

## 2025-06-20 RX ADMIN — CILOSTAZOL 50 MG: 100 TABLET ORAL at 08:58

## 2025-06-20 RX ADMIN — CARVEDILOL 12.5 MG: 12.5 TABLET, FILM COATED ORAL at 06:03

## 2025-06-20 RX ADMIN — ROSUVASTATIN 40 MG: 20 TABLET, FILM COATED ORAL at 17:14

## 2025-06-20 RX ADMIN — GABAPENTIN 300 MG: 300 CAPSULE ORAL at 08:58

## 2025-06-20 RX ADMIN — METHOCARBAMOL 1000 MG: 500 TABLET ORAL at 13:55

## 2025-06-20 RX ADMIN — OXYCODONE 5 MG: 5 TABLET ORAL at 09:29

## 2025-06-20 RX ADMIN — Medication 10 MG: at 21:19

## 2025-06-20 RX ADMIN — OXYCODONE 5 MG: 5 TABLET ORAL at 21:19

## 2025-06-20 RX ADMIN — GABAPENTIN 300 MG: 300 CAPSULE ORAL at 13:55

## 2025-06-20 RX ADMIN — ASPIRIN 81 MG: 81 TABLET, COATED ORAL at 08:59

## 2025-06-20 RX ADMIN — FERROUS SULFATE TAB EC 324 MG (65 MG FE EQUIVALENT) 324 MG: 324 (65 FE) TABLET DELAYED RESPONSE at 08:58

## 2025-06-20 RX ADMIN — GABAPENTIN 300 MG: 300 CAPSULE ORAL at 21:19

## 2025-06-20 RX ADMIN — CILOSTAZOL 50 MG: 100 TABLET ORAL at 21:19

## 2025-06-20 RX ADMIN — Medication 1 CAPSULE: at 08:58

## 2025-06-20 RX ADMIN — ENOXAPARIN SODIUM 30 MG: 100 INJECTION SUBCUTANEOUS at 09:00

## 2025-06-20 RX ADMIN — PANTOPRAZOLE SODIUM 40 MG: 40 TABLET, DELAYED RELEASE ORAL at 06:03

## 2025-06-20 RX ADMIN — CARBOXYMETHYLCELLULOSE SODIUM 1 DROP: 0.5 SOLUTION/ DROPS OPHTHALMIC at 08:59

## 2025-06-20 ASSESSMENT — PAIN SCALES - GENERAL
PAINLEVEL_OUTOF10: 5
PAINLEVEL_OUTOF10: 6

## 2025-06-20 NOTE — FLOWSHEET NOTE
06/20/25 1002   Assessment   Charting Type Shift assessment   Psychosocial   Psychosocial (WDL) WDL   Neurological   Neuro (WDL) WDL   Kim Coma Scale   Eye Opening 4   Best Verbal Response 5   Best Motor Response 6   [place] Coma Scale Score 15   HEENT (Head, Ears, Eyes, Nose, & Throat)   HEENT (WDL) X   Right Eye Glasses   Left Eye Glasses   Teeth Dentures upper;Dentures lower   Respiratory   Respiratory (WDL) WDL   Cardiac   Cardiac (WDL) WDL   Gastrointestinal   Abdominal (WDL) X   Abdomen Inspection Rounded   RUQ Bowel Sounds Active   LUQ Bowel Sounds Active   RLQ Bowel Sounds Active   LLQ Bowel Sounds Active   Tenderness No guarding   Genitourinary   Genitourinary (WDL) WDL   Urine Assessment   Urine Color Yellow/straw   Urine Appearance Clear   Peripheral Vascular   Peripheral Vascular (WDL) X   RLE Edema Trace   LLE Edema Trace   Anti-Embolism   Anti-Embolism Intervention Medication   Skin Integumentary    Skin Color Pale   Skin Condition/Temp Dry;Warm   Skin Integrity Abrasion   Location LUE   Skin Integumentary (WDL) X   Skin Integrity Site 2   Skin Integrity Location 2 Incision (see LDA)   Location 2 L hiip   Skin Integrity Site 3   Skin Integrity Location 3 Redness    Location 3 L heel   Preventative Dressing No   Skin Integrity Site 4   Skin Integrity Location 4 Wound (see LDA)   Location 4 R heel   Musculoskeletal   RL Extremity Weakness;Unsteady   LL Extremity Weakness;Unsteady   Musculoskeletal (WDL) X   Musculoskeletal Details   L Hip Surgery   Wound 06/17/25 Heel Right   Date First Assessed/Time First Assessed: 06/17/25 0800   Primary Wound Type: Pressure Injury  Location: Heel  Wound Location Orientation: Right   Wound Etiology Deep tissue/Injury   Dressing Status Clean;Dry;Intact   Wound Cleansed Cleansed with saline   Dressing/Treatment   (mepilex)   Wound Assessment Purple/maroon   Drainage Amount None (dry)   Odor None   Iesha-wound Assessment Non-blanchable erythema   Wound 06/13/25 Hip

## 2025-06-20 NOTE — FLOWSHEET NOTE
06/19/25 2222   Assessment   Charting Type Shift assessment   Psychosocial   Psychosocial (WDL) WDL   Neurological   Neuro (WDL) WDL   Kim Coma Scale   Eye Opening 4   Best Verbal Response 5   Best Motor Response 6   [place] Coma Scale Score 15   NIH Stroke Scale   NIH Stroke Scale Assessed No   HEENT (Head, Ears, Eyes, Nose, & Throat)   HEENT (WDL) X   Right Eye Glasses   Left Eye Glasses   Teeth Dentures upper;Dentures lower   Respiratory   Respiratory (WDL) WDL   Cardiac   Cardiac (WDL) WDL   Gastrointestinal   Abdominal (WDL) X   Abdomen Inspection Rounded   Last BM (including prior to admit) 06/17/25   RUQ Bowel Sounds Active   LUQ Bowel Sounds Active   RLQ Bowel Sounds Active   LLQ Bowel Sounds Active   Genitourinary   Genitourinary (WDL) WDL   Urine Assessment   Urinary Status Voiding   Urine Color Yellow/straw   Peripheral Vascular   Peripheral Vascular (WDL) X   RLE Edema Trace   LLE Edema Trace   Anti-Embolism   Anti-Embolism Intervention Medication   Skin Integumentary    Skin Color Pale   Skin Condition/Temp Warm;Dry   Skin Integrity Abrasion   Location LUE   Multiple Skin Integrity Sites Yes   Skin Integumentary (WDL) X   Skin Integrity Site 2   Skin Integrity Location 2 Incision (see LDA)   Location 2 left hip   Preventative Dressing No   Skin Integrity Site 3   Skin Integrity Location 3 Redness    Location 3 LT heel   Skin Integrity Site 4   Skin Integrity Location 4 Wound (see LDA)   Location 4 rt heel   Preventative Dressing Yes   Care Plan - Skin/Tissue Integrity Goals   Skin Integrity Remains Intact Monitor for areas of redness and/or skin breakdown   Musculoskeletal   RL Extremity Weakness;Unsteady   LL Extremity Weakness;Unsteady   Musculoskeletal (WDL) X   Wound 06/17/25 Heel Right   Date First Assessed/Time First Assessed: 06/17/25 0800   Primary Wound Type: Pressure Injury  Location: Heel  Wound Location Orientation: Right   Wound Etiology Deep tissue/Injury   Dressing Status

## 2025-06-20 NOTE — PLAN OF CARE
Problem: Discharge Planning  Goal: Discharge to home or other facility with appropriate resources  6/19/2025 2321 by Kenisha Salinas RN  Outcome: Progressing  Flowsheets (Taken 6/19/2025 2222)  Discharge to home or other facility with appropriate resources:   Identify barriers to discharge with patient and caregiver   Refer to discharge planning if patient needs post-hospital services based on physician order or complex needs related to functional status, cognitive ability or social support system  6/19/2025 0959 by Melida Moody RN  Outcome: Progressing  Flowsheets (Taken 6/19/2025 0825)  Discharge to home or other facility with appropriate resources: Identify barriers to discharge with patient and caregiver     Problem: Safety - Adult  Goal: Free from fall injury  6/19/2025 2321 by Kenisha Salinas RN  Outcome: Progressing  6/19/2025 0959 by Melida Moody RN  Outcome: Progressing  Flowsheets (Taken 6/19/2025 0959)  Free From Fall Injury: Instruct family/caregiver on patient safety     Problem: Skin/Tissue Integrity  Goal: Skin integrity remains intact  Description: 1.  Monitor for areas of redness and/or skin breakdown2.  Assess vascular access sites hourly3.  Every 4-6 hours minimum:  Change oxygen saturation probe site4.  Every 4-6 hours:  If on nasal continuous positive airway pressure, respiratory therapy assess nares and determine need for appliance change or resting period  6/19/2025 2321 by Kenisha Salinas RN  Outcome: Progressing  Flowsheets (Taken 6/19/2025 2222)  Skin Integrity Remains Intact: Monitor for areas of redness and/or skin breakdown  6/19/2025 0959 by Melida Moody RN  Outcome: Progressing  Flowsheets  Taken 6/19/2025 0959  Skin Integrity Remains Intact: Monitor for areas of redness and/or skin breakdown  Taken 6/19/2025 0825  Skin Integrity Remains Intact: Monitor for areas of redness and/or skin breakdown     Problem: ABCDS Injury Assessment  Goal: Absence of physical

## 2025-06-20 NOTE — PLAN OF CARE
Problem: Discharge Planning  Goal: Discharge to home or other facility with appropriate resources  6/19/2025 2321 by Kenisha Salinas, RN  Outcome: Progressing  Flowsheets (Taken 6/19/2025 2222)  Discharge to home or other facility with appropriate resources:   Identify barriers to discharge with patient and caregiver   Refer to discharge planning if patient needs post-hospital services based on physician order or complex needs related to functional status, cognitive ability or social support system     Problem: Skin/Tissue Integrity  Goal: Skin integrity remains intact  Description: 1.  Monitor for areas of redness and/or skin breakdown2.  Assess vascular access sites hourly3.  Every 4-6 hours minimum:  Change oxygen saturation probe site4.  Every 4-6 hours:  If on nasal continuous positive airway pressure, respiratory therapy assess nares and determine need for appliance change or resting period  6/20/2025 1001 by Nazanin Murphy RN  Outcome: Progressing  6/19/2025 2321 by Kenisha Salinas RN  Outcome: Progressing  Flowsheets (Taken 6/19/2025 2222)  Skin Integrity Remains Intact: Monitor for areas of redness and/or skin breakdown     Problem: ABCDS Injury Assessment  Goal: Absence of physical injury  6/19/2025 2321 by Kenisha Salinas, RN  Outcome: Progressing     Problem: Nutrition Deficit:  Goal: Optimize nutritional status  6/19/2025 2321 by Kenisha Salinas, RN  Outcome: Progressing

## 2025-06-20 NOTE — PROGRESS NOTES
Physical Therapy  Facility/Department: [facility] MED SURG  Daily Treatment Note  NAME: Jose Palma  : 1945  MRN: [id number]    Date of Service: 2025    Discharge Recommendations:  Continue to assess pending progress, Home with assist PRN, Therapy recommended at discharge      Patient Diagnosis(es): There were no encounter diagnoses.    Assessment  Assessment: Cotreated with OT. Patient completed bed mobility tasks with supervision. [facility] and called his wife to bring the car up to practice a transfer. Patient stood from bedside with Mod A of 2 and transferred with RW to the w/c. Completed B LE therex in sitting with assistance to move L leg. Patient had difficulty moving his L LE to propel the w/c. Stood with Mod A of 2 and ambulated 4 feet and 2 feet with RW, Min/Mod A of 2, and w/c follow. Patient reports increased L LE pain today and continues to have difficulty shifting his weight. Transported patient outside to practice a car transfer. Attempted with and without a walker to transfer to the front seat. Patient was unable to complete. Attempted to transfer to his R side to the back seat, but patient was also unable to complete the transfer. Transported patient to the therapy gym for gait training in the //bars. Patient was only able to take a couple of steps before needing to sit down due to pain. Transported patient back to his room and was left up in w/c with wife present. Discussed patient with PA who will order an X-ray and address possible changes to meds.  Activity Tolerance: Patient limited by pain;Patient limited by fatigue;Patient limited by endurance    Plan  Physical Therapy Plan  General Plan: 3-5 times per week  Days Per Week: 5 Days  Therapy Duration: 2 Weeks  Current Treatment Recommendations: Strengthening;ROM;Balance training;Functional mobility training;Transfer training;Endurance training;Pain management;Stair training;Gait training;Home

## 2025-06-20 NOTE — PROGRESS NOTES
Occupational Therapy  Facility/Department: [facility] MED SURG  Reassessment 14 day  NAME: Jose Palma  : 1945  MRN: [id number]    Date of Service: 2025    Discharge Recommendations:  Continue to assess pending progress         Patient Diagnosis(es): There were no encounter diagnoses.     Assessment   Assessment: Pt received in bed on this date. Co tx with PTA. Pt transferred to sitting at EOB with MOD I. Pt sat at EOB and donned socks using sock aid with min assist and min verbal cuing. Pt declined wanting to change clothing. Pt come to stand from EOB with MOD x2 and transferred to chair with MOD x2. Pt sat in chair and participated in AAROM in LLE to increase flexibility and decrease stiffness. Pt had pain medication prior to entry. Pt self propelled WC to hallway ~20 feet with BUE and encouraging use of BLE. Pt had difficulty using LLE to assist with mobility. Pt come to stand MOD x2 and took 3 steps had brief seated rest break and come to stand with MOD x2 and ambulated x3 feet. Pt reported increased difficulty weight shifting and increased pain. Pt transferred to sitting with MOD x2 assist. Pt spouse present in room at this time and brought car for transfer. Pt transported to car outside and completed transfer training. Pt attem-pted x2 trials in front seat and in back seat. Patient had significant difficulty weight shifting to complete task. Pt assisted to sitting position and transported back into therapy gym in parallel bars. Patient come to stand with MOD x2 assist to complete sit to stand and ambulated ~3 steps and requested to sit down secondary to pain. Pt transported back to room. Pt upset about not being able to complete transfer. Educated patient to request to get out of bed from nursing staff during the weekend. Pt verbalized understanding. Pt encouraged to participate in BUE ther ex while upright in chair during the weekend. Discussed concerns with PA on this date about lack of

## 2025-06-21 PROCEDURE — 6370000000 HC RX 637 (ALT 250 FOR IP): Performed by: PHYSICIAN ASSISTANT

## 2025-06-21 PROCEDURE — 1200000002 HC SEMI PRIVATE SWING BED

## 2025-06-21 PROCEDURE — 6360000002 HC RX W HCPCS: Performed by: INTERNAL MEDICINE

## 2025-06-21 PROCEDURE — 94761 N-INVAS EAR/PLS OXIMETRY MLT: CPT

## 2025-06-21 RX ADMIN — FOLIC ACID 1 MG: 1 TABLET ORAL at 08:21

## 2025-06-21 RX ADMIN — GABAPENTIN 300 MG: 300 CAPSULE ORAL at 08:20

## 2025-06-21 RX ADMIN — CARVEDILOL 12.5 MG: 12.5 TABLET, FILM COATED ORAL at 05:47

## 2025-06-21 RX ADMIN — ASPIRIN 81 MG: 81 TABLET, COATED ORAL at 08:21

## 2025-06-21 RX ADMIN — GABAPENTIN 300 MG: 300 CAPSULE ORAL at 13:25

## 2025-06-21 RX ADMIN — DICLOFENAC SODIUM 2 G: 10 GEL TOPICAL at 08:22

## 2025-06-21 RX ADMIN — METHOCARBAMOL 1000 MG: 500 TABLET ORAL at 13:25

## 2025-06-21 RX ADMIN — FERROUS SULFATE TAB EC 324 MG (65 MG FE EQUIVALENT) 324 MG: 324 (65 FE) TABLET DELAYED RESPONSE at 08:21

## 2025-06-21 RX ADMIN — CILOSTAZOL 50 MG: 100 TABLET ORAL at 08:20

## 2025-06-21 RX ADMIN — PANTOPRAZOLE SODIUM 40 MG: 40 TABLET, DELAYED RELEASE ORAL at 05:47

## 2025-06-21 RX ADMIN — METHOCARBAMOL 1000 MG: 500 TABLET ORAL at 08:20

## 2025-06-21 RX ADMIN — CYANOCOBALAMIN TAB 500 MCG 1000 MCG: 500 TAB at 08:21

## 2025-06-21 RX ADMIN — ERGOCALCIFEROL 50000 UNITS: 1.25 CAPSULE ORAL at 08:20

## 2025-06-21 RX ADMIN — OXYCODONE 5 MG: 5 TABLET ORAL at 08:24

## 2025-06-21 RX ADMIN — Medication 1 CAPSULE: at 08:21

## 2025-06-21 RX ADMIN — ENOXAPARIN SODIUM 30 MG: 100 INJECTION SUBCUTANEOUS at 08:21

## 2025-06-21 NOTE — FLOWSHEET NOTE
06/21/25 1543   Wound 06/13/25 Hip Left;Proximal;Upper   Date First Assessed: 06/13/25   Present on Original Admission: Yes  Location: Hip  Wound Location Orientation: Left;Proximal;Upper   Wound Image    Wound Etiology Surgical   Dressing Status New dressing applied   Wound Cleansed Ana OLIVO notified of worsening wound. Wound hard and warm to touch. [facility]. Edyta stated [facility] swing bed here. Discussed with pt and pt agrees to plan.

## 2025-06-21 NOTE — PLAN OF CARE
Problem: Discharge Planning  Goal: Discharge to home or other facility with appropriate resources  Outcome: Progressing     Problem: Safety - Adult  Goal: Free from fall injury  Outcome: Progressing     Problem: Skin/Tissue Integrity  Goal: Skin integrity remains intact  Description: 1.  Monitor for areas of redness and/or skin breakdown2.  Assess vascular access sites hourly3.  Every 4-6 hours minimum:  Change oxygen saturation probe site4.  Every 4-6 hours:  If on nasal continuous positive airway pressure, respiratory therapy assess nares and determine need for appliance change or resting period  6/20/2025 2126 by Ricky Zafar RN  Outcome: Progressing  6/20/2025 1001 by Nazanin Murphy RN  Outcome: Progressing     Problem: ABCDS Injury Assessment  Goal: Absence of physical injury  Outcome: Progressing     Problem: Nutrition Deficit:  Goal: Optimize nutritional status  Outcome: Progressing     Problem: Pain  Goal: Verbalizes/displays adequate comfort level or baseline comfort level  6/20/2025 2126 by Ricky Zafar RN  Outcome: Progressing  6/20/2025 1001 by Nazanni Murphy RN  Outcome: Progressing

## 2025-06-21 NOTE — FLOWSHEET NOTE
06/20/25 2100   Assessment   Charting Type Shift assessment   Psychosocial   Psychosocial (WDL) WDL   Neurological   Neuro (WDL) WDL   Kim Coma Scale   Eye Opening 4   Best Verbal Response 5   Best Motor Response 6   [place] Coma Scale Score 15   HEENT (Head, Ears, Eyes, Nose, & Throat)   HEENT (WDL) X   Right Eye Glasses   Left Eye Glasses   Teeth Dentures upper;Dentures lower   Respiratory   Respiratory (WDL) WDL   Cardiac   Cardiac (WDL) WDL   Gastrointestinal   Abdominal (WDL) X   Abdomen Inspection Rounded   RUQ Bowel Sounds Active   LUQ Bowel Sounds Active   RLQ Bowel Sounds Active   LLQ Bowel Sounds Active   Tenderness No guarding   Genitourinary   Genitourinary (WDL) WDL   Urine Assessment   Urine Color Yellow/straw   Urine Appearance Clear   Peripheral Vascular   Peripheral Vascular (WDL) X   RLE Edema Trace   LLE Edema Trace   Anti-Embolism   Anti-Embolism Intervention Medication   Skin Integumentary    Skin Color Pale   Skin Condition/Temp Warm;Dry   Skin Integrity Abrasion   Location LUE   Skin Integumentary (WDL) X   Musculoskeletal   RL Extremity Weakness;Unsteady   LL Extremity Weakness;Unsteady   Musculoskeletal (WDL) X   Musculoskeletal Details   L Hip Surgery   Wound 06/17/25 Heel Right   Date First Assessed/Time First Assessed: 06/17/25 0800   Primary Wound Type: Pressure Injury  Location: Heel  Wound Location Orientation: Right   Wound Etiology Deep tissue/Injury   Dressing/Treatment   (mepilex)   Wound Assessment Purple/maroon   Drainage Amount None (dry)   Odor None   Iesha-wound Assessment Non-blanchable erythema   Wound 06/13/25 Hip Left;Proximal;Upper   Date First Assessed: 06/13/25   Present on Original Admission: Yes  Location: Hip  Wound Location Orientation: Left;Proximal;Upper   Wound Etiology Surgical   Dressing Status   (TURNER)   Wound Cleansed Vashe   Dressing/Treatment Open to air   Wound Assessment Pink/red;Other (Comment)  (wound is well approximated, mild redness noted

## 2025-06-21 NOTE — PROGRESS NOTES
[facility], report given to rehana CRAIG. Nurse made aware that [facility] that [facility] come [facility] here to swing bed.

## 2025-06-21 NOTE — FLOWSHEET NOTE
06/21/25 0821   Assessment   Charting Type Shift assessment   Psychosocial   Psychosocial (WDL) WDL   Neurological   Neuro (WDL) WDL   Kim Coma Scale   Eye Opening 4   Best Verbal Response 5   Best Motor Response 6   [place] Coma Scale Score 15   HEENT (Head, Ears, Eyes, Nose, & Throat)   HEENT (WDL) X   Right Eye Glasses   Left Eye Glasses   Teeth Dentures upper;Dentures lower   Respiratory   Respiratory (WDL) WDL   Cardiac   Cardiac (WDL) WDL   Gastrointestinal   Abdominal (WDL) X   Abdomen Inspection Rounded   RUQ Bowel Sounds Active   LUQ Bowel Sounds Active   RLQ Bowel Sounds Active   LLQ Bowel Sounds Active   Tenderness No guarding   Genitourinary   Genitourinary (WDL) WDL   Peripheral Vascular   Peripheral Vascular (WDL) X   RLE Edema Trace   LLE Edema Trace   Anti-Embolism   Anti-Embolism Intervention Medication   Skin Integumentary    Skin Color Pale   Skin Condition/Temp Warm;Dry   Skin Integrity Abrasion   Location lue   Skin Integumentary (WDL) X   Skin Integrity Site 2   Skin Integrity Location 2 Incision (see LDA)   Location 2 L hip   Assessed this shift Yes   Skin Integrity Site 3   Skin Integrity Location 3 Redness    Location 3 L heel   Skin Integrity Site 4   Skin Integrity Location 4 Wound (see LDA)   Location 4 R heel   Care Plan - Skin/Tissue Integrity Goals   Skin Integrity Remains Intact Monitor for areas of redness and/or skin breakdown   Musculoskeletal   RL Extremity Weakness;Unsteady   LL Extremity Weakness;Unsteady   Musculoskeletal (WDL) X   Musculoskeletal Details   L Hip Surgery   Wound 06/17/25 Heel Right   Date First Assessed/Time First Assessed: 06/17/25 0800   Primary Wound Type: Pressure Injury  Location: Heel  Wound Location Orientation: Right   Wound Etiology Deep tissue/Injury   Dressing/Treatment   (mepilex)   Wound Assessment Purple/maroon   Drainage Amount None (dry)   Odor None   Iesha-wound Assessment Non-blanchable erythema   Wound 06/13/25 Hip Left;Proximal;Upper

## 2025-06-21 NOTE — PLAN OF CARE
Problem: Discharge Planning  Goal: Discharge to home or other facility with appropriate resources  6/21/2025 0923 by Lauren Jones RN  Outcome: Progressing  6/20/2025 2126 by Ricky Zafar RN  Outcome: Progressing     Problem: Safety - Adult  Goal: Free from fall injury  6/21/2025 0923 by Lauren Jones RN  Outcome: Progressing  6/20/2025 2126 by Ricky Zafar RN  Outcome: Progressing     Problem: Skin/Tissue Integrity  Goal: Skin integrity remains intact  Description: 1.  Monitor for areas of redness and/or skin breakdown2.  Assess vascular access sites hourly3.  Every 4-6 hours minimum:  Change oxygen saturation probe site4.  Every 4-6 hours:  If on nasal continuous positive airway pressure, respiratory therapy assess nares and determine need for appliance change or resting period  6/21/2025 0923 by Lauren Jones RN  Outcome: Progressing  Flowsheets (Taken 6/21/2025 0821)  Skin Integrity Remains Intact: Monitor for areas of redness and/or skin breakdown  6/20/2025 2126 by Ricky Zafar RN  Outcome: Progressing     Problem: ABCDS Injury Assessment  Goal: Absence of physical injury  6/21/2025 0923 by Lauren Jones RN  Outcome: Progressing  6/20/2025 2126 by Ricky Zafar RN  Outcome: Progressing     Problem: Nutrition Deficit:  Goal: Optimize nutritional status  6/21/2025 0923 by Lauren Jones RN  Outcome: Progressing  6/20/2025 2126 by Ricky Zafar RN  Outcome: Progressing     Problem: Pain  Goal: Verbalizes/displays adequate comfort level or baseline comfort level  6/21/2025 0923 by Lauren Jones RN  Outcome: Progressing  6/20/2025 2126 by Ricky Zafar RN  Outcome: Progressing

## 2025-06-22 VITALS
BODY MASS INDEX: 33.87 KG/M2 | OXYGEN SATURATION: 94 % | DIASTOLIC BLOOD PRESSURE: 67 MMHG | HEART RATE: 83 BPM | HEIGHT: 72 IN | TEMPERATURE: 98.4 F | WEIGHT: 250.06 LBS | SYSTOLIC BLOOD PRESSURE: 120 MMHG | RESPIRATION RATE: 16 BRPM

## 2025-06-22 PROCEDURE — 6360000002 HC RX W HCPCS: Performed by: INTERNAL MEDICINE

## 2025-06-22 PROCEDURE — 6370000000 HC RX 637 (ALT 250 FOR IP): Performed by: INTERNAL MEDICINE

## 2025-06-22 PROCEDURE — 1200000002 HC SEMI PRIVATE SWING BED

## 2025-06-22 RX ORDER — ROSUVASTATIN CALCIUM 20 MG/1
40 TABLET, COATED ORAL NIGHTLY
Status: DISCONTINUED | OUTPATIENT
Start: 2025-06-22 | End: 2025-06-26 | Stop reason: HOSPADM

## 2025-06-22 RX ORDER — CEPHALEXIN 500 MG/1
500 CAPSULE ORAL EVERY 6 HOURS SCHEDULED
Status: DISCONTINUED | OUTPATIENT
Start: 2025-06-22 | End: 2025-06-25

## 2025-06-22 RX ORDER — CILOSTAZOL 100 MG/1
50 TABLET ORAL 2 TIMES DAILY
Status: DISCONTINUED | OUTPATIENT
Start: 2025-06-22 | End: 2025-06-26 | Stop reason: HOSPADM

## 2025-06-22 RX ORDER — CARVEDILOL 12.5 MG/1
12.5 TABLET ORAL 2 TIMES DAILY
Status: DISCONTINUED | OUTPATIENT
Start: 2025-06-22 | End: 2025-06-26 | Stop reason: HOSPADM

## 2025-06-22 RX ORDER — CARBOXYMETHYLCELLULOSE SODIUM 5 MG/ML
1 SOLUTION/ DROPS OPHTHALMIC 4 TIMES DAILY
Status: DISCONTINUED | OUTPATIENT
Start: 2025-06-22 | End: 2025-06-26 | Stop reason: HOSPADM

## 2025-06-22 RX ORDER — ACETAMINOPHEN 325 MG/1
650 TABLET ORAL EVERY 4 HOURS PRN
Status: DISCONTINUED | OUTPATIENT
Start: 2025-06-22 | End: 2025-06-26 | Stop reason: HOSPADM

## 2025-06-22 RX ORDER — RIVASTIGMINE 9.5 MG/24H
1 PATCH, EXTENDED RELEASE TRANSDERMAL DAILY
Status: DISCONTINUED | OUTPATIENT
Start: 2025-06-22 | End: 2025-06-26 | Stop reason: HOSPADM

## 2025-06-22 RX ORDER — MULTIVITAMIN WITH IRON
1000 TABLET ORAL DAILY
Status: DISCONTINUED | OUTPATIENT
Start: 2025-06-22 | End: 2025-06-26 | Stop reason: HOSPADM

## 2025-06-22 RX ORDER — SENNOSIDES 8.6 MG/1
2 TABLET ORAL NIGHTLY PRN
Status: DISCONTINUED | OUTPATIENT
Start: 2025-06-22 | End: 2025-06-26 | Stop reason: HOSPADM

## 2025-06-22 RX ORDER — PANTOPRAZOLE SODIUM 40 MG/1
40 TABLET, DELAYED RELEASE ORAL
Status: DISCONTINUED | OUTPATIENT
Start: 2025-06-22 | End: 2025-06-26 | Stop reason: HOSPADM

## 2025-06-22 RX ORDER — SODIUM CHLOR/HYPOCHLOROUS ACID 0.033 %
SOLUTION, IRRIGATION IRRIGATION DAILY
Status: DISCONTINUED | OUTPATIENT
Start: 2025-06-22 | End: 2025-06-26 | Stop reason: HOSPADM

## 2025-06-22 RX ORDER — NICOTINE 21 MG/24HR
1 PATCH, TRANSDERMAL 24 HOURS TRANSDERMAL DAILY
Status: DISCONTINUED | OUTPATIENT
Start: 2025-06-22 | End: 2025-06-26 | Stop reason: HOSPADM

## 2025-06-22 RX ORDER — FERROUS SULFATE 324(65)MG
324 TABLET, DELAYED RELEASE (ENTERIC COATED) ORAL
Status: DISCONTINUED | OUTPATIENT
Start: 2025-06-22 | End: 2025-06-26 | Stop reason: HOSPADM

## 2025-06-22 RX ORDER — ASPIRIN 81 MG/1
81 TABLET, CHEWABLE ORAL DAILY
Status: DISCONTINUED | OUTPATIENT
Start: 2025-06-22 | End: 2025-06-26 | Stop reason: HOSPADM

## 2025-06-22 RX ORDER — LACTOBACILLUS RHAMNOSUS GG 10B CELL
1 CAPSULE ORAL
Status: DISCONTINUED | OUTPATIENT
Start: 2025-06-22 | End: 2025-06-26 | Stop reason: HOSPADM

## 2025-06-22 RX ORDER — ENOXAPARIN SODIUM 100 MG/ML
30 INJECTION SUBCUTANEOUS 2 TIMES DAILY
Status: DISCONTINUED | OUTPATIENT
Start: 2025-06-22 | End: 2025-06-26 | Stop reason: HOSPADM

## 2025-06-22 RX ORDER — FUROSEMIDE 40 MG/1
40 TABLET ORAL DAILY
Status: DISCONTINUED | OUTPATIENT
Start: 2025-06-22 | End: 2025-06-26 | Stop reason: HOSPADM

## 2025-06-22 RX ORDER — METHOCARBAMOL 500 MG/1
1000 TABLET, FILM COATED ORAL 3 TIMES DAILY
Status: DISCONTINUED | OUTPATIENT
Start: 2025-06-22 | End: 2025-06-26 | Stop reason: HOSPADM

## 2025-06-22 RX ORDER — ERGOCALCIFEROL 1.25 MG/1
50000 CAPSULE, LIQUID FILLED ORAL WEEKLY
Status: DISCONTINUED | OUTPATIENT
Start: 2025-06-28 | End: 2025-06-26 | Stop reason: HOSPADM

## 2025-06-22 RX ORDER — MECOBALAMIN 5000 MCG
10 TABLET,DISINTEGRATING ORAL NIGHTLY PRN
Status: DISCONTINUED | OUTPATIENT
Start: 2025-06-22 | End: 2025-06-26 | Stop reason: HOSPADM

## 2025-06-22 RX ORDER — SIMETHICONE 80 MG
80 TABLET,CHEWABLE ORAL 3 TIMES DAILY PRN
Status: DISCONTINUED | OUTPATIENT
Start: 2025-06-22 | End: 2025-06-26 | Stop reason: HOSPADM

## 2025-06-22 RX ORDER — OXYCODONE HYDROCHLORIDE 5 MG/1
5 TABLET ORAL EVERY 6 HOURS PRN
Status: DISCONTINUED | OUTPATIENT
Start: 2025-06-22 | End: 2025-06-26 | Stop reason: HOSPADM

## 2025-06-22 RX ORDER — POLYETHYLENE GLYCOL 3350 17 G/17G
17 POWDER, FOR SOLUTION ORAL DAILY PRN
Status: DISCONTINUED | OUTPATIENT
Start: 2025-06-22 | End: 2025-06-26 | Stop reason: HOSPADM

## 2025-06-22 RX ORDER — GABAPENTIN 300 MG/1
300 CAPSULE ORAL 3 TIMES DAILY
Status: DISCONTINUED | OUTPATIENT
Start: 2025-06-22 | End: 2025-06-26 | Stop reason: HOSPADM

## 2025-06-22 RX ORDER — FOLIC ACID 1 MG/1
1 TABLET ORAL DAILY
Status: DISCONTINUED | OUTPATIENT
Start: 2025-06-22 | End: 2025-06-26 | Stop reason: HOSPADM

## 2025-06-22 RX ADMIN — ASPIRIN 81 MG: 81 TABLET, CHEWABLE ORAL at 07:56

## 2025-06-22 RX ADMIN — METHOCARBAMOL 1000 MG: 500 TABLET ORAL at 13:38

## 2025-06-22 RX ADMIN — FERROUS SULFATE TAB EC 324 MG (65 MG FE EQUIVALENT) 324 MG: 324 (65 FE) TABLET DELAYED RESPONSE at 07:55

## 2025-06-22 RX ADMIN — GABAPENTIN 300 MG: 300 CAPSULE ORAL at 22:01

## 2025-06-22 RX ADMIN — DICLOFENAC SODIUM 2 G: 10 GEL TOPICAL at 22:03

## 2025-06-22 RX ADMIN — CEPHALEXIN 500 MG: 500 CAPSULE ORAL at 07:56

## 2025-06-22 RX ADMIN — CARVEDILOL 12.5 MG: 12.5 TABLET, FILM COATED ORAL at 07:56

## 2025-06-22 RX ADMIN — CYANOCOBALAMIN TAB 500 MCG 1000 MCG: 500 TAB at 07:55

## 2025-06-22 RX ADMIN — OXYCODONE 5 MG: 5 TABLET ORAL at 22:02

## 2025-06-22 RX ADMIN — PANTOPRAZOLE SODIUM 40 MG: 40 TABLET, DELAYED RELEASE ORAL at 05:04

## 2025-06-22 RX ADMIN — GABAPENTIN 300 MG: 300 CAPSULE ORAL at 13:39

## 2025-06-22 RX ADMIN — Medication: at 07:57

## 2025-06-22 RX ADMIN — FOLIC ACID 1 MG: 1 TABLET ORAL at 07:56

## 2025-06-22 RX ADMIN — DICLOFENAC SODIUM 2 G: 10 GEL TOPICAL at 07:57

## 2025-06-22 RX ADMIN — CARBOXYMETHYLCELLULOSE SODIUM 1 DROP: 0.5 SOLUTION/ DROPS OPHTHALMIC at 07:55

## 2025-06-22 RX ADMIN — Medication 1 CAPSULE: at 07:55

## 2025-06-22 RX ADMIN — CARBOXYMETHYLCELLULOSE SODIUM 1 DROP: 0.5 SOLUTION/ DROPS OPHTHALMIC at 22:01

## 2025-06-22 RX ADMIN — CILOSTAZOL 50 MG: 100 TABLET ORAL at 07:55

## 2025-06-22 RX ADMIN — CEPHALEXIN 500 MG: 500 CAPSULE ORAL at 17:28

## 2025-06-22 RX ADMIN — CARBOXYMETHYLCELLULOSE SODIUM 1 DROP: 0.5 SOLUTION/ DROPS OPHTHALMIC at 13:38

## 2025-06-22 RX ADMIN — FUROSEMIDE 40 MG: 40 TABLET ORAL at 07:55

## 2025-06-22 RX ADMIN — CEPHALEXIN 500 MG: 500 CAPSULE ORAL at 13:39

## 2025-06-22 RX ADMIN — CILOSTAZOL 50 MG: 100 TABLET ORAL at 22:02

## 2025-06-22 RX ADMIN — Medication 10 MG: at 22:01

## 2025-06-22 RX ADMIN — CARVEDILOL 12.5 MG: 12.5 TABLET, FILM COATED ORAL at 22:02

## 2025-06-22 RX ADMIN — ROSUVASTATIN 40 MG: 20 TABLET, FILM COATED ORAL at 22:02

## 2025-06-22 RX ADMIN — METHOCARBAMOL 1000 MG: 500 TABLET ORAL at 07:55

## 2025-06-22 RX ADMIN — ENOXAPARIN SODIUM 30 MG: 100 INJECTION SUBCUTANEOUS at 08:02

## 2025-06-22 RX ADMIN — ENOXAPARIN SODIUM 30 MG: 100 INJECTION SUBCUTANEOUS at 22:01

## 2025-06-22 RX ADMIN — GABAPENTIN 300 MG: 300 CAPSULE ORAL at 07:55

## 2025-06-22 RX ADMIN — METHOCARBAMOL 1000 MG: 500 TABLET ORAL at 22:01

## 2025-06-22 NOTE — PLAN OF CARE
Problem: Safety - Adult  Goal: Free from fall injury  Outcome: Progressing     Problem: Nutrition Deficit:  Goal: Optimize nutritional status  Outcome: Progressing

## 2025-06-22 NOTE — PROGRESS NOTES
[facility]  Patient has prescription for PO cephalexin 500mg 4x daily and discharge instructions to watch for s/s of infection in wound on left hip. Patient's vitals WNL. Call bell and bedside table within reach. Bed in lowest position.

## 2025-06-22 NOTE — PLAN OF CARE
Problem: Safety - Adult  Goal: Free from fall injury  6/22/2025 0922 by Lauren Jones RN  Outcome: Progressing  6/22/2025 0459 by Ricky Zafar RN  Outcome: Progressing     Problem: Nutrition Deficit:  Goal: Optimize nutritional status  6/22/2025 0922 by Lauren Jones RN  Outcome: Progressing  6/22/2025 0459 by Ricky Zafar, RN  Outcome: Progressing

## 2025-06-22 NOTE — FLOWSHEET NOTE
06/22/25 0756   Assessment   Charting Type Shift assessment   Psychosocial   Psychosocial (WDL) WDL   Neurological   Neuro (WDL) WDL   Kim Coma Scale   Eye Opening 4   Best Verbal Response 5   Best Motor Response 6   [place] Coma Scale Score 15   HEENT (Head, Ears, Eyes, Nose, & Throat)   HEENT (WDL) X   Right Eye Glasses   Left Eye Glasses   Teeth Dentures upper;Dentures lower   Respiratory   Respiratory (WDL) WDL   Cardiac   Cardiac (WDL) WDL   Gastrointestinal   Abdominal (WDL) X   Abdomen Inspection Rounded   RUQ Bowel Sounds Active   LUQ Bowel Sounds Active   RLQ Bowel Sounds Active   LLQ Bowel Sounds Active   Tenderness No guarding   Genitourinary   Genitourinary (WDL) WDL   Peripheral Vascular   Peripheral Vascular (WDL) X   RLE Edema Trace   LLE Edema Trace   Anti-Embolism   Anti-Embolism Intervention Medication   Skin Integumentary    Skin Color Pale   Skin Condition/Temp Warm;Dry   Skin Integrity Abrasion;Incision (see LDA)   Location left hip   Skin Integumentary (WDL) X   Skin Integrity Site 2   Skin Integrity Location 2 Incision (see LDA)   Location 2 L hip   Skin Integrity Site 3   Skin Integrity Location 3 Redness    Location 3 L hip   Skin Integrity Site 4   Skin Integrity Location 4 Wound (see LDA)   Location 4 R heel   Care Plan - Skin/Tissue Integrity Goals   Skin Integrity Remains Intact Monitor for areas of redness and/or skin breakdown   Musculoskeletal   RL Extremity Weakness;Unsteady   LL Extremity Weakness;Unsteady   Musculoskeletal (WDL) X   Musculoskeletal Details   L Hip Surgery   Wound 06/17/25 Heel Right   Date First Assessed/Time First Assessed: 06/17/25 0800   Primary Wound Type: Pressure Injury  Location: Heel  Wound Location Orientation: Right   Wound Etiology Deep tissue/Injury   Dressing/Treatment   (mepilex)   Wound Assessment Purple/maroon   Drainage Amount None (dry)   Odor None   Iesha-wound Assessment Non-blanchable erythema   Wound 06/13/25 Hip Left;Proximal;Upper    SUBJECTIVE:   CC: Ricky Varela is an 56 year old male who presents for preventive health visit.       Patient has been advised of split billing requirements and indicates understanding: Yes  Healthy Habits:  Do you get at least three servings of calcium containing foods daily (dairy, green leafy vegetables, etc.)? yes  Amount of exercise or daily activities, outside of work: weight lifting, walking  Problems taking medications regularly No  Medication side effects: No  Have you had an eye exam in the past two years? yes  Do you see a dentist twice per year? yes  Do you have sleep apnea, excessive snoring or daytime drowsiness?no      -------------------------------------    Patient repots that he had three episodes of dizziness associated with excessive salivation. The episodes were brief and not associated with headache, vision changes, nausea, vomiting or abdominal pain. He reports that he was sleeping during one of the episodes.     Today's PHQ-2 Score:       2/16/2024     8:27 AM 2/10/2023     9:32 AM   PHQ-2 ( 1999 [employer])   Q1: Little interest or pleasure in doing things 0 0   Q2: Feeling down, depressed or hopeless 0 0   PHQ-2 Score 0 0       Abuse: Current or Past(Physical, Sexual or Emotional)- No  Do you feel safe in your environment? Yes        Social History     Tobacco Use    Smoking status: Never    Smokeless tobacco: Never   Substance Use Topics    Alcohol use: No     If you drink alcohol do you typically have >3 drinks per day or >7 drinks per week? No                      Last PSA:   PSA Tumor Marker   Date Value Ref Range Status   02/10/2023 3.26 0.00 - 3.50 ng/mL Final       Reviewed orders with patient. Reviewed health maintenance and updated orders accordingly - Yes  Labs reviewed in EPIC    Reviewed and updated as needed this visit by clinical staff   Tobacco  Allergies  Meds              Reviewed and updated as needed this visit by Provider                  Past Medical History:  "  Diagnosis Date    Alopecia areata     recurrent    Hyperlipidemia LDL goal < 160       Past Surgical History:   Procedure Laterality Date    CIRCUMCISION  7/8/2014    Procedure: CIRCUMCISION;  Surgeon: Tomas Francisco MD;  Location: [facility]    COLONOSCOPY  2015    NORMAL, repeat in 2020    VASECTOMY         ROS:  CONSTITUTIONAL: NEGATIVE for fever, chills, change in weight  INTEGUMENTARY/SKIN: NEGATIVE for worrisome rashes, moles or lesions  EYES: NEGATIVE for vision changes or irritation  ENT: NEGATIVE for ear, mouth and throat problems  RESP: NEGATIVE for significant cough or SOB  CV: NEGATIVE for chest pain, palpitations or peripheral edema  GI: NEGATIVE for nausea, abdominal pain, heartburn, or change in bowel habits   male: negative for dysuria, hematuria, decreased urinary stream, erectile dysfunction, urethral discharge  MUSCULOSKELETAL: NEGATIVE for significant arthralgias or myalgia  NEURO: NEGATIVE for weakness, dizziness or paresthesias  PSYCHIATRIC: NEGATIVE for changes in mood or affect    OBJECTIVE:   /89 (BP Location: Right arm, Patient Position: Sitting, Cuff Size: Adult Regular)   Pulse 81   Ht 1.676 m (5' 5.98\")   Wt 72.3 kg (159 lb 8 oz)   SpO2 96%   BMI 25.76 kg/m    EXAM:  GENERAL: alert and no distress  EYES: Eyes grossly normal to inspection, PERRL and conjunctivae and sclerae normal  HENT: normal cephalic/atraumatic, oropharynx clear, and oral mucous membranes moist  RESP: lungs clear to auscultation - no rales, rhonchi or wheezes  CV: regular rate and rhythm, normal S1 S2, no S3 or S4, no murmur, click or rub, no peripheral edema  ABDOMEN: soft, nontender and bowel sounds normal  MS: no gross musculoskeletal defects noted, no edema  SKIN: no suspicious lesions or rashes  NEURO: Normal strength and tone, mentation intact and speech normal    Diagnostic Test Results:  Labs reviewed in Epic    ASSESSMENT/PLAN:       ICD-10-CM    1. Preventative health care  Z00.00 REVIEW OF " "HEALTH MAINTENANCE PROTOCOL ORDERS     COVID-19 12+ (2023-24) (PFIZER)      2. Hypertriglyceridemia  E78.1       3. Family history of colon cancer  Z80.0 Colonoscopy Screening  Referral          Patient has been advised of split billing requirements and indicates understanding: Yes  COUNSELING:  Reviewed preventive health counseling, as reflected in patient instructions       Regular exercise       Healthy diet/nutrition    Estimated body mass index is 25.76 kg/m  as calculated from the following:    Height as of this encounter: 1.676 m (5' 5.98\").    Weight as of this encounter: 72.3 kg (159 lb 8 oz).        He reports that he has never smoked. He has never used smokeless tobacco.      Counseling Resources:  ATP IV Guidelines  Pooled Cohorts Equation Calculator  FRAX Risk Assessment  ICSI Preventive Guidelines  Dietary Guidelines for Americans, 2010  USDA's MyPlate  ASA Prophylaxis  Lung CA Screening    Cait Kaiser MD  [facility] INTERNAL MEDICINE [place]    "

## 2025-06-22 NOTE — PROGRESS NOTES
RN notified me that patient continuing to have worsening pain and left hip wound with worsening erythema, drainage, and not well approximated under steri strips. The wound had well healing appearance when previously evaluated Friday at bedside by RAFA Back and myself. Left hip xray performed Friday due to complaint of worsening pain and difficulty working with therapy without acute finding. Attempted to [facility] ( called Friday) but we were instructed that he could not have an appointment and would need [facility] with [facility] issues. [facility] for further evaluation of worsening pain, gait difficulty, and wound changes.     Edyta Villasenor PA-C

## 2025-06-22 NOTE — FLOWSHEET NOTE
06/22/25 0449   Assessment   Charting Type Shift assessment   Psychosocial   Psychosocial (WDL) WDL   Neurological   Neuro (WDL) WDL   [place] Coma Scale   Eye Opening 4   Best Verbal Response 5   Best Motor Response 6   [place] Coma Scale Score 15   HEENT (Head, Ears, Eyes, Nose, & Throat)   HEENT (WDL) X   Right Eye Glasses   Left Eye Glasses   Teeth Dentures upper;Dentures lower   Respiratory   Respiratory (WDL) WDL   Cardiac   Cardiac (WDL) WDL   Gastrointestinal   Abdominal (WDL) X   Abdomen Inspection Rounded   RUQ Bowel Sounds Active   LUQ Bowel Sounds Active   RLQ Bowel Sounds Active   LLQ Bowel Sounds Active   Tenderness No guarding   Genitourinary   Genitourinary (WDL) WDL   Peripheral Vascular   Peripheral Vascular (WDL) X   RLE Edema Trace   LLE Edema Trace   Anti-Embolism   Anti-Embolism Intervention Medication   Dual Clinician Skin Assessment   Dual Skin Assessment (4 Eyes) X   Pressure Injury Documentation Pressure Injury Noted-See Wound LDA to Document   Second Clinical  (First and Last Name) Lonny Suero RN   Skin Integumentary    Skin Color Pale   Skin Condition/Temp Warm;Dry   Skin Integrity Abrasion;Incision (see LDA)   Location left hip   Skin Integumentary (WDL) X   Musculoskeletal   RL Extremity Weakness;Unsteady   LL Extremity Weakness;Unsteady   Musculoskeletal (WDL) X   Musculoskeletal Details   L Hip Surgery   Wound 06/17/25 Heel Right   Date First Assessed/Time First Assessed: 06/17/25 0800   Primary Wound Type: Pressure Injury  Location: Heel  Wound Location Orientation: Right   Wound Etiology Deep tissue/Injury   Dressing/Treatment   (mepilex)   Wound Assessment Purple/maroon   Drainage Amount None (dry)   Odor None   Iesha-wound Assessment Non-blanchable erythema   Wound 06/13/25 Hip Left;Proximal;Upper   Date First Assessed: 06/13/25   Present on Original Admission: Yes  Location: Hip  Wound Location Orientation: Left;Proximal;Upper   Wound Etiology Surgical   Dressing

## 2025-06-23 ENCOUNTER — APPOINTMENT (OUTPATIENT)
Dept: GENERAL RADIOLOGY | Facility: HOSPITAL | Age: 80
DRG: 862 | End: 2025-06-23
Attending: INTERNAL MEDICINE
Payer: MEDICARE

## 2025-06-23 LAB
ALBUMIN SERPL-MCNC: 3.2 G/DL (ref 3.4–4.8)
ALBUMIN/GLOB SERPL: 1 {RATIO} (ref 0.8–2)
ALP SERPL-CCNC: 113 U/L (ref 25–100)
ALT SERPL-CCNC: 8 U/L (ref 4–36)
ANION GAP SERPL CALCULATED.3IONS-SCNC: 11 MMOL/L (ref 3–16)
AST SERPL-CCNC: 12 U/L (ref 8–33)
BASOPHILS # BLD: 0 K/UL (ref 0–0.1)
BASOPHILS NFR BLD: 0.5 %
BILIRUB SERPL-MCNC: 0.4 MG/DL (ref 0.3–1.2)
BUN SERPL-MCNC: 25 MG/DL (ref 6–20)
CALCIUM SERPL-MCNC: 9 MG/DL (ref 8.3–10.6)
CHLORIDE SERPL-SCNC: 105 MMOL/L (ref 98–107)
CO2 SERPL-SCNC: 23 MMOL/L (ref 20–30)
CREAT SERPL-MCNC: 1.7 MG/DL (ref 0.8–1.3)
EOSINOPHIL # BLD: 0.2 K/UL (ref 0–0.4)
EOSINOPHIL NFR BLD: 2.5 %
ERYTHROCYTE [DISTWIDTH] IN BLOOD BY AUTOMATED COUNT: 14.9 % (ref 11–16)
GFR SERPLBLD CREATININE-BSD FMLA CKD-EPI: 41 ML/MIN/{1.73_M2}
GLOBULIN SER CALC-MCNC: 3.2 G/DL
GLUCOSE SERPL-MCNC: 88 MG/DL (ref 74–106)
HCT VFR BLD AUTO: 29 % (ref 40–54)
HGB BLD-MCNC: 9.3 G/DL (ref 13–18)
IMM GRANULOCYTES # BLD: 0 K/UL
IMM GRANULOCYTES NFR BLD: 0.2 % (ref 0–5)
LYMPHOCYTES # BLD: 1.7 K/UL (ref 1.5–4)
LYMPHOCYTES NFR BLD: 19.4 %
MAGNESIUM SERPL-MCNC: 2 MG/DL (ref 1.7–2.4)
MCH RBC QN AUTO: 30.5 PG (ref 27–32)
MCHC RBC AUTO-ENTMCNC: 32.1 G/DL (ref 31–35)
MCV RBC AUTO: 95.1 FL (ref 80–100)
MONOCYTES # BLD: 0.8 K/UL (ref 0.2–0.8)
MONOCYTES NFR BLD: 9.6 %
NEUTROPHILS # BLD: 5.9 K/UL (ref 2–7.5)
NEUTS SEG NFR BLD: 67.8 %
PLATELET # BLD AUTO: 261 K/UL (ref 150–400)
PMV BLD AUTO: 10.2 FL (ref 6–10)
POTASSIUM SERPL-SCNC: 3.7 MMOL/L (ref 3.4–5.1)
PROT SERPL-MCNC: 6.4 G/DL (ref 6.4–8.3)
RBC # BLD AUTO: 3.05 M/UL (ref 4.5–6)
SODIUM SERPL-SCNC: 139 MMOL/L (ref 136–145)
WBC # BLD AUTO: 8.7 K/UL (ref 4–11)

## 2025-06-23 PROCEDURE — 97530 THERAPEUTIC ACTIVITIES: CPT

## 2025-06-23 PROCEDURE — 6370000000 HC RX 637 (ALT 250 FOR IP): Performed by: INTERNAL MEDICINE

## 2025-06-23 PROCEDURE — 36415 COLL VENOUS BLD VENIPUNCTURE: CPT

## 2025-06-23 PROCEDURE — 97535 SELF CARE MNGMENT TRAINING: CPT

## 2025-06-23 PROCEDURE — 97110 THERAPEUTIC EXERCISES: CPT

## 2025-06-23 PROCEDURE — 6370000000 HC RX 637 (ALT 250 FOR IP): Performed by: NURSE PRACTITIONER

## 2025-06-23 PROCEDURE — 83735 ASSAY OF MAGNESIUM: CPT

## 2025-06-23 PROCEDURE — 6360000002 HC RX W HCPCS: Performed by: INTERNAL MEDICINE

## 2025-06-23 PROCEDURE — 85025 COMPLETE CBC W/AUTO DIFF WBC: CPT

## 2025-06-23 PROCEDURE — 80053 COMPREHEN METABOLIC PANEL: CPT

## 2025-06-23 PROCEDURE — 1200000002 HC SEMI PRIVATE SWING BED

## 2025-06-23 RX ORDER — ONDANSETRON 4 MG/1
4 TABLET, ORALLY DISINTEGRATING ORAL 3 TIMES DAILY PRN
Status: DISCONTINUED | OUTPATIENT
Start: 2025-06-23 | End: 2025-06-26 | Stop reason: HOSPADM

## 2025-06-23 RX ORDER — HONEY 100 %
PASTE (ML) TOPICAL DAILY
Status: DISCONTINUED | OUTPATIENT
Start: 2025-06-23 | End: 2025-06-26 | Stop reason: HOSPADM

## 2025-06-23 RX ORDER — GUAIFENESIN 600 MG/1
600 TABLET, EXTENDED RELEASE ORAL 2 TIMES DAILY
Status: DISCONTINUED | OUTPATIENT
Start: 2025-06-23 | End: 2025-06-26 | Stop reason: HOSPADM

## 2025-06-23 RX ORDER — SODIUM CHLOR/HYPOCHLOROUS ACID 0.033 %
SOLUTION, IRRIGATION IRRIGATION DAILY
Status: DISCONTINUED | OUTPATIENT
Start: 2025-06-23 | End: 2025-06-26 | Stop reason: HOSPADM

## 2025-06-23 RX ADMIN — DICLOFENAC SODIUM 2 G: 10 GEL TOPICAL at 08:51

## 2025-06-23 RX ADMIN — Medication: at 21:37

## 2025-06-23 RX ADMIN — METHOCARBAMOL 1000 MG: 500 TABLET ORAL at 08:59

## 2025-06-23 RX ADMIN — GABAPENTIN 300 MG: 300 CAPSULE ORAL at 13:42

## 2025-06-23 RX ADMIN — PANTOPRAZOLE SODIUM 40 MG: 40 TABLET, DELAYED RELEASE ORAL at 05:06

## 2025-06-23 RX ADMIN — CEPHALEXIN 500 MG: 500 CAPSULE ORAL at 05:07

## 2025-06-23 RX ADMIN — CARBOXYMETHYLCELLULOSE SODIUM 1 DROP: 0.5 SOLUTION/ DROPS OPHTHALMIC at 11:59

## 2025-06-23 RX ADMIN — DICLOFENAC SODIUM 2 G: 10 GEL TOPICAL at 21:35

## 2025-06-23 RX ADMIN — CARBOXYMETHYLCELLULOSE SODIUM 1 DROP: 0.5 SOLUTION/ DROPS OPHTHALMIC at 17:35

## 2025-06-23 RX ADMIN — GABAPENTIN 300 MG: 300 CAPSULE ORAL at 21:34

## 2025-06-23 RX ADMIN — FUROSEMIDE 40 MG: 40 TABLET ORAL at 08:41

## 2025-06-23 RX ADMIN — CEPHALEXIN 500 MG: 500 CAPSULE ORAL at 11:58

## 2025-06-23 RX ADMIN — OXYCODONE 5 MG: 5 TABLET ORAL at 21:34

## 2025-06-23 RX ADMIN — GABAPENTIN 300 MG: 300 CAPSULE ORAL at 08:59

## 2025-06-23 RX ADMIN — CEPHALEXIN 500 MG: 500 CAPSULE ORAL at 17:35

## 2025-06-23 RX ADMIN — CEPHALEXIN 500 MG: 500 CAPSULE ORAL at 00:10

## 2025-06-23 RX ADMIN — ROSUVASTATIN 40 MG: 20 TABLET, FILM COATED ORAL at 21:33

## 2025-06-23 RX ADMIN — Medication: at 21:36

## 2025-06-23 RX ADMIN — Medication: at 08:52

## 2025-06-23 RX ADMIN — METHOCARBAMOL 1000 MG: 500 TABLET ORAL at 13:42

## 2025-06-23 RX ADMIN — FERROUS SULFATE TAB EC 324 MG (65 MG FE EQUIVALENT) 324 MG: 324 (65 FE) TABLET DELAYED RESPONSE at 08:41

## 2025-06-23 RX ADMIN — CILOSTAZOL 50 MG: 100 TABLET ORAL at 08:41

## 2025-06-23 RX ADMIN — CILOSTAZOL 50 MG: 100 TABLET ORAL at 21:34

## 2025-06-23 RX ADMIN — ONDANSETRON 4 MG: 4 TABLET, ORALLY DISINTEGRATING ORAL at 12:05

## 2025-06-23 RX ADMIN — METHOCARBAMOL 1000 MG: 500 TABLET ORAL at 21:33

## 2025-06-23 RX ADMIN — CARVEDILOL 12.5 MG: 12.5 TABLET, FILM COATED ORAL at 21:34

## 2025-06-23 RX ADMIN — CARBOXYMETHYLCELLULOSE SODIUM 1 DROP: 0.5 SOLUTION/ DROPS OPHTHALMIC at 08:59

## 2025-06-23 RX ADMIN — GUAIFENESIN 600 MG: 600 TABLET, EXTENDED RELEASE ORAL at 17:35

## 2025-06-23 RX ADMIN — Medication 10 MG: at 21:34

## 2025-06-23 RX ADMIN — ASPIRIN 81 MG: 81 TABLET, CHEWABLE ORAL at 08:47

## 2025-06-23 RX ADMIN — CARBOXYMETHYLCELLULOSE SODIUM 1 DROP: 0.5 SOLUTION/ DROPS OPHTHALMIC at 21:35

## 2025-06-23 RX ADMIN — FOLIC ACID 1 MG: 1 TABLET ORAL at 08:41

## 2025-06-23 RX ADMIN — CYANOCOBALAMIN TAB 500 MCG 1000 MCG: 500 TAB at 08:41

## 2025-06-23 RX ADMIN — Medication 1 CAPSULE: at 08:41

## 2025-06-23 RX ADMIN — CARVEDILOL 12.5 MG: 12.5 TABLET, FILM COATED ORAL at 08:41

## 2025-06-23 RX ADMIN — ENOXAPARIN SODIUM 30 MG: 100 INJECTION SUBCUTANEOUS at 21:34

## 2025-06-23 RX ADMIN — ENOXAPARIN SODIUM 30 MG: 100 INJECTION SUBCUTANEOUS at 08:47

## 2025-06-23 ASSESSMENT — PAIN DESCRIPTION - LOCATION
LOCATION: HIP;LEG
LOCATION: LEG;KNEE

## 2025-06-23 ASSESSMENT — PAIN DESCRIPTION - ORIENTATION
ORIENTATION: LEFT
ORIENTATION: LEFT

## 2025-06-23 ASSESSMENT — PAIN SCALES - GENERAL
PAINLEVEL_OUTOF10: 5
PAINLEVEL_OUTOF10: 7
PAINLEVEL_OUTOF10: 0

## 2025-06-23 NOTE — FLOWSHEET NOTE
06/23/25 1549   Wound 06/17/25 Heel Right   Date First Assessed/Time First Assessed: 06/17/25 0800   Primary Wound Type: Pressure Injury  Location: Heel  Wound Location Orientation: Right   Wound Image    Wound Etiology Deep tissue/Injury   Dressing Status Clean;Dry;Intact   Wound Cleansed Cleansed with saline   Dressing/Treatment   (see orders)   Dressing Change Due 06/23/25   Wound Length (cm) 4.5 cm   Wound Width (cm) 4.7 cm   Wound Depth (cm) 0 cm   Wound Surface Area (cm^2) 21.15 cm^2   Change in Wound Size % (l*w) -323   Wound Volume (cm^3) 0 cm^3   Wound Assessment Purple/maroon;Fluid filled blister   Drainage Amount None (dry)   Odor None   Iesha-wound Assessment Non-blanchable erythema   Wound 06/13/25 Hip Left;Proximal;Upper   Date First Assessed: 06/13/25   Present on Original Admission: Yes  Location: Hip  Wound Location Orientation: Left;Proximal;Upper   Wound Image    Wound Etiology Surgical   Dressing Status Clean;Dry;Intact   Wound Cleansed Vashe   Dressing/Treatment   (see orders)   Dressing Change Due 06/23/25   Wound Length (cm) 1.7 cm  (area of slough)   Wound Width (cm) 0.7 cm  (area of slough)   Wound Depth (cm) 0 cm   Wound Surface Area (cm^2) 1.19 cm^2   Wound Volume (cm^3) 0 cm^3   Wound Assessment [place]/red;Slough   Drainage Amount Scant (moist but unmeasurable)   Drainage Description Serosanguinous   Odor None   Iesha-wound Assessment Blanchable erythema   Margins Attached edges   Wound 06/13/25 Hip Left;Lower   Date First Assessed: 06/13/25   Present on Original Admission: Yes  Location: Hip  Wound Location Orientation: Left;Lower   Wound Image    Wound Etiology Surgical   Dressing Status Other (Comment)  (TURNER)   Wound Cleansed Vashe   Dressing/Treatment Open to air   Wound Assessment   (wound is well approximated, no drainage, no odor, no pain to palpation)   Drainage Amount None (dry)   Odor None   Iesha-wound Assessment Blanchable erythema   Margins Attached edges     Upper proximal

## 2025-06-23 NOTE — FLOWSHEET NOTE
06/23/25 0053   Assessment   Charting Type Shift assessment   Psychosocial   Psychosocial (WDL) WDL   Neurological   Neuro (WDL) WDL   Kim Coma Scale   Eye Opening 4   Best Verbal Response 5   Best Motor Response 6   [place] Coma Scale Score 15   NIH Stroke Scale   NIH Stroke Scale Assessed No   HEENT (Head, Ears, Eyes, Nose, & Throat)   HEENT (WDL) X   Right Eye Glasses   Left Eye Glasses   Teeth Dentures upper;Dentures lower   Respiratory   Respiratory (WDL) WDL   Cardiac   Cardiac (WDL) WDL   Gastrointestinal   Abdominal (WDL) X   Abdomen Inspection Rounded   Last BM (including prior to admit) 06/22/25   RUQ Bowel Sounds Active   LUQ Bowel Sounds Active   RLQ Bowel Sounds Active   LLQ Bowel Sounds Active   Genitourinary   Genitourinary (WDL) WDL   Urine Assessment   Urinary Status Voiding   Urine Color Yellow/straw   Urine Appearance Clear   Peripheral Vascular   Peripheral Vascular (WDL) X   RLE Edema Trace   LLE Edema Trace   Anti-Embolism   Anti-Embolism Intervention Medication   Skin Integumentary    Skin Color Pink   Skin Condition/Temp Warm;Dry   Skin Integrity Incision (see LDA)   Location seft hip   Multiple Skin Integrity Sites Yes   Skin Integumentary (WDL) X   Skin Integrity Site 2   Skin Integrity Location 2 Incision (see LDA)   Location 2 left hip   Preventative Dressing No   Assessed this shift Yes   Skin Integrity Site 3   Skin Integrity Location 3 Redness    Location 3 left hip   Preventative Dressing Yes   Skin Integrity Site 4   Skin Integrity Location 4 Wound (see LDA)   Location 4 right heep   Preventative Dressing Yes   Wound Prevention and Protection Methods   Location of Wound Prevention Heel   Dressing Present  Yes   Wound Offloading (Prevention Methods) Pillows   Care Plan - Skin/Tissue Integrity Goals   Skin Integrity Remains Intact Monitor for areas of redness and/or skin breakdown   Musculoskeletal   RL Extremity Weakness;Unsteady   LL Extremity Weakness;Unsteady

## 2025-06-23 NOTE — PROGRESS NOTES
Nursing assistant reported patient vomiting this am. Patient setting up in chair no distress noted. Informed Dr. Guevara. New orders received for Zofran 4 mg ODT TID PRN. Spoke with the patient and informed the patient Zofran order. Patient stated \" I am fine now I think I just got choked on the apple juice and pancakes.\" Patient denies the need for zofran att. Will continue plan of care. Call bell within reach.

## 2025-06-23 NOTE — PLAN OF CARE
Problem: Discharge Planning  Goal: Discharge to home or other facility with appropriate resources  Recent Flowsheet Documentation  Taken 6/23/2025 0053 by Kenisha Salinas RN  Discharge to home or other facility with appropriate resources:   Identify barriers to discharge with patient and caregiver   Refer to discharge planning if patient needs post-hospital services based on physician order or complex needs related to functional status, cognitive ability or social support system     Problem: Safety - Adult  Goal: Free from fall injury  Outcome: Progressing     Problem: Skin/Tissue Integrity  Goal: Skin integrity remains intact  Description: 1.  Monitor for areas of redness and/or skin breakdown2.  Assess vascular access sites hourly3.  Every 4-6 hours minimum:  Change oxygen saturation probe site4.  Every 4-6 hours:  If on nasal continuous positive airway pressure, respiratory therapy assess nares and determine need for appliance change or resting period  Recent Flowsheet Documentation  Taken 6/23/2025 0053 by Kenisha Salinas RN  Skin Integrity Remains Intact: Monitor for areas of redness and/or skin breakdown     Problem: Nutrition Deficit:  Goal: Optimize nutritional status  Outcome: Progressing

## 2025-06-23 NOTE — PROGRESS NOTES
Patient refused Chest ex-ray stated \" [facility].\" Notified Lorelei Montez

## 2025-06-23 NOTE — PROGRESS NOTES
CT scan while there. States he was given a prescription for antibiotics and returned to this facility.    Objective  Bed Mobility Training  Bed Mobility Training: Yes  Overall Level of Assistance: Supervision;Minimal assistance  Interventions: Verbal cues  Rolling: Supervision  Sit to Supine: Minimal assistance (to get L leg into bed)  Scooting: Supervision  Balance  Sitting: Intact  Standing: With support;Impaired  Transfer Training  Transfer Training: Yes  Overall Level of Assistance: Partial/Moderate assistance;2 Person assistance  Interventions: Verbal cues;Safety awareness training  Sit to Stand: Partial/Moderate assistance;2 Person assistance  Stand to Sit: Minimal assistance;2 Person assistance  Chair to Bed:  (via Yamini Steady)     Safety Devices  Type of Devices: Left in bed;Call light within reach    Goals  Short Term Goals  Time Frame for Short Term Goals: 1 week  Short Term Goal 1: Pt to move supine to sit x SBA  Short Term Goal 2: Pt to transfer sit to stand x CGA with RW.  Short Term Goal 3: Pt to ambulate 100 feet with RW  Short Term Goal 4: Pt to transfer bed to chair x Min A with RW  Long Term Goals  Time Frame for Long Term Goals : 2 weeks  Long Term Goal 1: Pt to ambulate 250 feet or greater with RW x CGA  Long Term Goal 2: Pt to perform bed mobility x Mod I  Long Term Goal 3: Pt to transfer bed to chair x CGA with RW with good safety awareness.  Long Term Goal 4: Pt to demonstrate 4/5 or greater strength in LLE and R hip flexors grossly.    Therapy Time   Individual Concurrent Group Co-treatment   Time In 1413         Time Out 1515         Minutes 62             This note serves as D/C summary if patient is discharged prior to next visit.   Tequila Ruiz, PTA

## 2025-06-23 NOTE — FLOWSHEET NOTE
Discussed left hip wound with ELTON WOMACK. [facility] Updated primary RN Neida Bonner. Record request faxed to [phone].

## 2025-06-23 NOTE — PLAN OF CARE
Problem: Safety - Adult  Goal: Free from fall injury  6/23/2025 1145 by Shannan Lincoln RN  Outcome: Progressing  6/23/2025 0149 by Kenisha Salinas RN  Outcome: Progressing     Problem: Nutrition Deficit:  Goal: Optimize nutritional status  6/23/2025 1145 by Shannan Lincoln RN  Outcome: Progressing  6/23/2025 0149 by Kenisha Salinas RN  Outcome: Progressing

## 2025-06-23 NOTE — PROGRESS NOTES
[facility],.     Objective  Vitals     ADL  Grooming: Setup  Grooming Skilled Clinical Factors: sat in WC to complete shaving, washing face  UE Bathing: Setup  LE Bathing: Minimal assistance;Moderate assistance  LE Bathing Skilled Clinical Factors: difficulty with completing joyce hygiene and feet otherwise able to wash BLE  UE Dressing: Setup  LE Dressing: Moderate assistance;Adaptive equipment;Increased time to complete;Verbal cueing  Putting On/Taking Off Footwear: Moderate assistance  Product Used : Soap and water  OT Exercises  Resistive Exercises: Orange theraband retraction x20, shoulder flexion/extension, elbow flexion, punch outs  Pressure Relief Exercises: Chair push ups x10, trunk flexion x10     Goals  Short Term Goals  Short Term Goal 1: Pt will be MOD I with fx mobility tasks  Short Term Goal 2: Pt will be MOD I with fx transfers  Short Term Goal 3: Pt will be MOD I with dressing tasks  Short Term Goal 4: Pt will be MOD I with toileting tasks  Short Term Goal 5: Pt will be MOD I with bathing tasks    Therapy Time   Individual Concurrent Group Co-treatment   Time In 0141         Time Out 0312         Minutes 91          This note serves as a DC summary in the event of pt discharge.     Keila Mendoza, OTR/L

## 2025-06-23 NOTE — FLOWSHEET NOTE
06/23/25 0800   Assessment   Charting Type Shift assessment   Psychosocial   Psychosocial (WDL) WDL   Neurological   Neuro (WDL) WDL   Kim Coma Scale   Eye Opening 4   Best Verbal Response 5   Best Motor Response 6   [place] Coma Scale Score 15   NIH Stroke Scale   NIH Stroke Scale Assessed No   HEENT (Head, Ears, Eyes, Nose, & Throat)   HEENT (WDL) X   Right Eye Glasses   Left Eye Glasses   Teeth Dentures upper;Dentures lower   Respiratory   Respiratory (WDL) WDL   Cardiac   Cardiac (WDL) WDL   Rhythm Interpretation   Pulse 75   Gastrointestinal   Abdominal (WDL) X   Abdomen Inspection Rounded   Last BM (including prior to admit) 06/23/25   RUQ Bowel Sounds Active   LUQ Bowel Sounds Active   RLQ Bowel Sounds Active   LLQ Bowel Sounds Active   Tenderness No guarding   Genitourinary   Genitourinary (WDL) WDL   Urine Assessment   Urinary Status Voiding   Urine Color Yellow/straw   Urine Appearance Clear   Urine Odor   (strong NH3 odor)   Peripheral Vascular   Peripheral Vascular (WDL) X   RLE Edema Trace   LLE Edema Trace   Anti-Embolism   Anti-Embolism Intervention Medication   Skin Integumentary    Skin Color Pink   Skin Condition/Temp Dry;Warm   Skin Integrity Incision (see LDA)   Location LT HIP   Multiple Skin Integrity Sites Yes   Skin Integumentary (WDL) X   Skin Integrity Site 2   Skin Integrity Location 2 Incision (see LDA)   Location 2 LT HIP   Preventative Dressing No   Assessed this shift Yes   Skin Integrity Site 4   Skin Integrity Location 4 Wound (see LDA)   Location 4 RT HEEL   Preventative Dressing Yes   Date Applied 06/22/25   Assessed this shift Yes   Wound Prevention and Protection Methods   Location of Wound Prevention Heel   Dressing Present  Yes   Wound Offloading (Prevention Methods) Elevate heels   Care Plan - Skin/Tissue Integrity Goals   Skin Integrity Remains Intact Monitor for areas of redness and/or skin breakdown   Musculoskeletal   RL Extremity Weakness;Unsteady   LL Extremity

## 2025-06-24 PROCEDURE — 97110 THERAPEUTIC EXERCISES: CPT

## 2025-06-24 PROCEDURE — 87186 SC STD MICRODIL/AGAR DIL: CPT

## 2025-06-24 PROCEDURE — 1200000002 HC SEMI PRIVATE SWING BED

## 2025-06-24 PROCEDURE — 87205 SMEAR GRAM STAIN: CPT

## 2025-06-24 PROCEDURE — 97116 GAIT TRAINING THERAPY: CPT

## 2025-06-24 PROCEDURE — 87075 CULTR BACTERIA EXCEPT BLOOD: CPT

## 2025-06-24 PROCEDURE — 97530 THERAPEUTIC ACTIVITIES: CPT

## 2025-06-24 PROCEDURE — 87070 CULTURE OTHR SPECIMN AEROBIC: CPT

## 2025-06-24 PROCEDURE — 6370000000 HC RX 637 (ALT 250 FOR IP): Performed by: NURSE PRACTITIONER

## 2025-06-24 PROCEDURE — 87077 CULTURE AEROBIC IDENTIFY: CPT

## 2025-06-24 PROCEDURE — 86403 PARTICLE AGGLUT ANTBDY SCRN: CPT

## 2025-06-24 PROCEDURE — 6360000002 HC RX W HCPCS: Performed by: INTERNAL MEDICINE

## 2025-06-24 PROCEDURE — 6370000000 HC RX 637 (ALT 250 FOR IP): Performed by: INTERNAL MEDICINE

## 2025-06-24 PROCEDURE — 97803 MED NUTRITION INDIV SUBSEQ: CPT

## 2025-06-24 RX ADMIN — GABAPENTIN 300 MG: 300 CAPSULE ORAL at 13:49

## 2025-06-24 RX ADMIN — METHOCARBAMOL 1000 MG: 500 TABLET ORAL at 08:09

## 2025-06-24 RX ADMIN — CARBOXYMETHYLCELLULOSE SODIUM 1 DROP: 0.5 SOLUTION/ DROPS OPHTHALMIC at 17:24

## 2025-06-24 RX ADMIN — GUAIFENESIN 600 MG: 600 TABLET, EXTENDED RELEASE ORAL at 08:09

## 2025-06-24 RX ADMIN — GABAPENTIN 300 MG: 300 CAPSULE ORAL at 08:09

## 2025-06-24 RX ADMIN — Medication 1 CAPSULE: at 07:59

## 2025-06-24 RX ADMIN — CILOSTAZOL 50 MG: 100 TABLET ORAL at 08:08

## 2025-06-24 RX ADMIN — Medication 10 MG: at 21:31

## 2025-06-24 RX ADMIN — FERROUS SULFATE TAB EC 324 MG (65 MG FE EQUIVALENT) 324 MG: 324 (65 FE) TABLET DELAYED RESPONSE at 07:59

## 2025-06-24 RX ADMIN — ASPIRIN 81 MG: 81 TABLET, CHEWABLE ORAL at 08:08

## 2025-06-24 RX ADMIN — ENOXAPARIN SODIUM 30 MG: 100 INJECTION SUBCUTANEOUS at 21:27

## 2025-06-24 RX ADMIN — CEPHALEXIN 500 MG: 500 CAPSULE ORAL at 00:00

## 2025-06-24 RX ADMIN — FOLIC ACID 1 MG: 1 TABLET ORAL at 08:08

## 2025-06-24 RX ADMIN — FUROSEMIDE 40 MG: 40 TABLET ORAL at 08:08

## 2025-06-24 RX ADMIN — CARBOXYMETHYLCELLULOSE SODIUM 1 DROP: 0.5 SOLUTION/ DROPS OPHTHALMIC at 21:27

## 2025-06-24 RX ADMIN — EPOETIN ALFA-EPBX 10000 UNITS: 10000 INJECTION, SOLUTION INTRAVENOUS; SUBCUTANEOUS at 17:24

## 2025-06-24 RX ADMIN — CARBOXYMETHYLCELLULOSE SODIUM 1 DROP: 0.5 SOLUTION/ DROPS OPHTHALMIC at 08:19

## 2025-06-24 RX ADMIN — CEPHALEXIN 500 MG: 500 CAPSULE ORAL at 06:24

## 2025-06-24 RX ADMIN — ROSUVASTATIN 40 MG: 20 TABLET, FILM COATED ORAL at 21:27

## 2025-06-24 RX ADMIN — OXYCODONE 5 MG: 5 TABLET ORAL at 21:26

## 2025-06-24 RX ADMIN — METHOCARBAMOL 1000 MG: 500 TABLET ORAL at 21:26

## 2025-06-24 RX ADMIN — CARBOXYMETHYLCELLULOSE SODIUM 1 DROP: 0.5 SOLUTION/ DROPS OPHTHALMIC at 12:26

## 2025-06-24 RX ADMIN — Medication: at 08:21

## 2025-06-24 RX ADMIN — GUAIFENESIN 600 MG: 600 TABLET, EXTENDED RELEASE ORAL at 21:27

## 2025-06-24 RX ADMIN — GABAPENTIN 300 MG: 300 CAPSULE ORAL at 21:26

## 2025-06-24 RX ADMIN — CEPHALEXIN 500 MG: 500 CAPSULE ORAL at 17:24

## 2025-06-24 RX ADMIN — DICLOFENAC SODIUM 2 G: 10 GEL TOPICAL at 21:32

## 2025-06-24 RX ADMIN — CARVEDILOL 12.5 MG: 12.5 TABLET, FILM COATED ORAL at 21:27

## 2025-06-24 RX ADMIN — PANTOPRAZOLE SODIUM 40 MG: 40 TABLET, DELAYED RELEASE ORAL at 06:23

## 2025-06-24 RX ADMIN — CILOSTAZOL 50 MG: 100 TABLET ORAL at 21:27

## 2025-06-24 RX ADMIN — CEPHALEXIN 500 MG: 500 CAPSULE ORAL at 12:26

## 2025-06-24 RX ADMIN — ENOXAPARIN SODIUM 30 MG: 100 INJECTION SUBCUTANEOUS at 08:07

## 2025-06-24 RX ADMIN — CARVEDILOL 12.5 MG: 12.5 TABLET, FILM COATED ORAL at 08:09

## 2025-06-24 RX ADMIN — CYANOCOBALAMIN TAB 500 MCG 1000 MCG: 500 TAB at 08:08

## 2025-06-24 RX ADMIN — METHOCARBAMOL 1000 MG: 500 TABLET ORAL at 13:49

## 2025-06-24 RX ADMIN — DICLOFENAC SODIUM 2 G: 10 GEL TOPICAL at 08:20

## 2025-06-24 ASSESSMENT — PAIN DESCRIPTION - LOCATION
LOCATION: LEG;HIP
LOCATION: HIP;LEG

## 2025-06-24 ASSESSMENT — PAIN SCALES - GENERAL
PAINLEVEL_OUTOF10: 4
PAINLEVEL_OUTOF10: 5
PAINLEVEL_OUTOF10: 5
PAINLEVEL_OUTOF10: 6

## 2025-06-24 NOTE — PROGRESS NOTES
Comprehensive Nutrition Assessment    Type and Reason for Visit:   (Follow-up)    Nutrition Recommendations/Plan:   Continue current diet as medically appropriate and tolerated.   Continue to encourage PO intakes as appropriate. Avg of 43-64% x 9 meals; some meals being brought in for patient.   Continue Cornelius and Ensure Plus BID.    RD to follow patient and available PRN.      Malnutrition Assessment:  Malnutrition Status:  No malnutrition (06/09/25 1141)    Context:        Findings of the 6 clinical characteristics of malnutrition:  Energy Intake:     Weight Loss:        Body Fat Loss:        Muscle Mass Loss:       Fluid Accumulation:        Strength:       Nutrition Assessment:    Patient continues to be at moderate risk for ongoing nutritional compromise r/t wounds and fair-good intakes.    Nutrition Related Findings:    Weight loss of 3 lb in 4 days (different scales used; bedside vs bedscale); some may be r/t fluid status. Medications: vitamin D, retacrit, folic acid, lasix, B12, ferrous sulfate, lactobacillus. Labs: BUN 25, Cr 1.7, GFR 41, alb 3.2, alk phos 113. Trace edema BLE. Wound Type: Multiple, Deep Tissue Injury, Surgical Incision       Current Nutrition Intake & Therapies:    Average Meal Intake: 26-50%, 51-75% (43-64% x 9 meals)  Average Supplements Intake: 51-75%  ADULT DIET; Regular  ADULT ORAL NUTRITION SUPPLEMENT; Lunch, Dinner; Wound Healing Oral Supplement  ADULT ORAL NUTRITION SUPPLEMENT; Breakfast, Lunch; Standard High Calorie/High Protein Oral Supplement    Anthropometric Measures:  Height: 182.9 cm (6')  Ideal Body Weight (IBW): 178 lbs (81 kg)       Current Body Weight: 113.4 kg (250 lb), 140.4 % IBW. Weight Source: Bed scale  Current BMI (kg/m2): 33.9           Weight Adjustment For: No Adjustment                 BMI Categories: Obese Class 1 (BMI 30.0-34.9)    Estimated Daily Nutrient Needs:  Energy Requirements Based On: Formula  Weight Used for Energy Requirements: Current  Energy

## 2025-06-24 NOTE — PLAN OF CARE
Problem: Safety - Adult  Goal: Free from fall injury  6/24/2025 0900 by Shannan Lincoln RN  Outcome: Progressing  6/24/2025 0257 by Kenisha Salinas RN  Outcome: Progressing     Problem: Nutrition Deficit:  Goal: Optimize nutritional status  6/24/2025 0900 by Shannan Lincoln RN  Outcome: Progressing  6/24/2025 0257 by Kenisha Salinas RN  Outcome: Progressing

## 2025-06-24 NOTE — PROGRESS NOTES
Occupational Therapy  Facility/Department: [facility] MED SURG  Daily Treatment Note  NAME: Jose Palma  : 1945  MRN: [id number]    Date of Service: 2025    Discharge Recommendations:  Continue to assess pending progress     Patient Diagnosis(es): There were no encounter diagnoses.     Assessment   Assessment: Patient received in bed on this date. Pt is pleasant and cooperative. Co tx with PTA. Pt transferred to sitting from supine with SUP. Pt sat at EOB without assistance. Pt completed AAROM with LLE to decrease pain and increase flexibility. Pt completed trunk flexion x10.  Pt come to stand with MOD x2. Pt ambulated 5 feet to WC and transferred to sitting with min x2. Pt self propelled to hallway with BUE. Pt come to stand with MOD x2 and ambulated 15 feet with RW and min assist x2. Patient transferred to sitting with min x2. Pt reported fatigue. Pt come to stand with mod x2 and ambulated 7 feet with mod x2. Pt had fatigued and reported pain. Pt attempted last trial x3 feet with MOD x2. Pt had increased difficulty weight shifting at this time. Pt requires MOD tactile cues for postural correction each time coming to stand. Pt self propelled in WC back to room. Pt positioned self in WC and completed x10 chair push ups. Pt cotinues to have difficulty increasing activity.  Activity Tolerance: Patient limited by pain;Patient limited by fatigue;Patient limited by endurance    Subjective  Subjective  Subjective: I don't know what I will be able to do today.  Pain: left hip pain received pain medication prior to session     Objective  Vitals     Bed Mobility Training  Overall Level of Assistance: Supervision  Rolling: Supervision  Supine to Sit: Supervision  Scooting: Supervision  Transfer Training  Transfer Training: Yes  Overall Level of Assistance: Partial/Moderate assistance;2 Person assistance  Sit to Stand: Partial/Moderate assistance;2 Person assistance  Stand to Sit: Minimal assistance;2 Person

## 2025-06-24 NOTE — FLOWSHEET NOTE
06/24/25 0800   Assessment   Charting Type Shift assessment   Psychosocial   Psychosocial (WDL) WDL   Neurological   Neuro (WDL) WDL   Kim Coma Scale   Eye Opening 4   Best Verbal Response 5   Best Motor Response 6   [place] Coma Scale Score 15   NIH Stroke Scale   NIH Stroke Scale Assessed No   HEENT (Head, Ears, Eyes, Nose, & Throat)   HEENT (WDL) X   Right Eye Glasses   Left Eye Glasses   Teeth Dentures upper;Dentures lower   Respiratory   Respiratory (WDL) WDL   Cardiac   Cardiac (WDL) WDL   Gastrointestinal   Abdominal (WDL) X   Abdomen Inspection Rounded   RUQ Bowel Sounds Active   LUQ Bowel Sounds Active   RLQ Bowel Sounds Active   LLQ Bowel Sounds Active   Tenderness No guarding   Genitourinary   Genitourinary (WDL) WDL   Urine Assessment   Urinary Status Voiding   Urine Color Yellow/straw   Urine Appearance Clear   Urine Odor   (strong NH3 odor)   Peripheral Vascular   Peripheral Vascular (WDL) X   RLE Edema Trace   LLE Edema Trace   Anti-Embolism   Anti-Embolism Intervention Medication   Skin Integumentary    Skin Color Pink   Skin Condition/Temp Dry;Warm   Skin Integrity Incision (see LDA)   Location LT HIP   Multiple Skin Integrity Sites Yes   Skin Integumentary (WDL) X   Skin Integrity Site 2   Skin Integrity Location 2 Incision (see LDA)   Location 2 LT HIP   Preventative Dressing No   Skin Integrity Site 3   Skin Integrity Location 3 Redness    Location 3 LT HIP   Skin Integrity Site 4   Skin Integrity Location 4   (Wound (See LDA))   Location 4 RT HEEL   Preventative Dressing Yes   Wound Prevention and Protection Methods   Location of Wound Prevention Heel   Dressing Present  Yes   Wound Offloading (Prevention Methods) Elevate heels   Care Plan - Skin/Tissue Integrity Goals   Skin Integrity Remains Intact Monitor for areas of redness and/or skin breakdown;Turn and reposition as indicated;Check visual cues for pain   Musculoskeletal   RL Extremity Weakness;Unsteady   LL Extremity

## 2025-06-24 NOTE — FLOWSHEET NOTE
06/24/25 0200   Assessment   Charting Type Shift assessment   Psychosocial   Psychosocial (WDL) WDL   Neurological   Neuro (WDL) WDL   Kim Coma Scale   Eye Opening 4   Best Verbal Response 5   Best Motor Response 6   [place] Coma Scale Score 15   NIH Stroke Scale   NIH Stroke Scale Assessed No   HEENT (Head, Ears, Eyes, Nose, & Throat)   HEENT (WDL) X   Right Eye Glasses   Left Eye Glasses   Teeth Dentures upper;Dentures lower   Respiratory   Respiratory (WDL) WDL   Cardiac   Cardiac (WDL) WDL   Gastrointestinal   Abdominal (WDL) X   Abdomen Inspection Rounded   Last BM (including prior to admit) 06/23/25   RUQ Bowel Sounds Active   LUQ Bowel Sounds Active   RLQ Bowel Sounds Active   LLQ Bowel Sounds Active   Genitourinary   Genitourinary (WDL) WDL   Urine Assessment   Urinary Status Voiding   Urine Color Yellow/straw   Peripheral Vascular   Peripheral Vascular (WDL) X   RLE Edema Trace   LLE Edema Trace   Anti-Embolism   Anti-Embolism Intervention Medication   Skin Integumentary    Skin Color Pink   Skin Condition/Temp Warm;Dry   Skin Integrity Incision (see LDA)   Location left hip   Multiple Skin Integrity Sites Yes   Skin Integumentary (WDL) X   Skin Integrity Site 2   Skin Integrity Location 2 Incision (see LDA)   Location 2 lt hip   Skin Integrity Site 3   Skin Integrity Location 3 Redness    Location 3 left hip   Skin Integrity Site 4   Skin Integrity Location 4 Wound (see LDA)   Location 4 rt heep   Preventative Dressing Yes   Date Applied 06/23/25   Assessed this shift Yes   Wound Prevention and Protection Methods   Location of Wound Prevention Heel   Dressing Present  Yes   Skin Assessed Underneath Dressing This Shift Yes   Wound Offloading (Prevention Methods) Elevate heels   Care Plan - Skin/Tissue Integrity Goals   Skin Integrity Remains Intact Monitor for areas of redness and/or skin breakdown   Musculoskeletal   RL Extremity Weakness;Unsteady   LL Extremity Weakness;Unsteady   Musculoskeletal

## 2025-06-24 NOTE — PLAN OF CARE
Problem: Discharge Planning  Goal: Discharge to home or other facility with appropriate resources  Recent Flowsheet Documentation  Taken 6/24/2025 0200 by Kenisha Salinas RN  Discharge to home or other facility with appropriate resources:   Identify barriers to discharge with patient and caregiver   Refer to discharge planning if patient needs post-hospital services based on physician order or complex needs related to functional status, cognitive ability or social support system     Problem: Safety - Adult  Goal: Free from fall injury  Outcome: Progressing     Problem: Skin/Tissue Integrity  Goal: Skin integrity remains intact  Description: 1.  Monitor for areas of redness and/or skin breakdown2.  Assess vascular access sites hourly3.  Every 4-6 hours minimum:  Change oxygen saturation probe site4.  Every 4-6 hours:  If on nasal continuous positive airway pressure, respiratory therapy assess nares and determine need for appliance change or resting period  Recent Flowsheet Documentation  Taken 6/24/2025 0200 by Kenisha Salinas RN  Skin Integrity Remains Intact: Monitor for areas of redness and/or skin breakdown     Problem: Nutrition Deficit:  Goal: Optimize nutritional status  Outcome: Progressing

## 2025-06-24 NOTE — PROGRESS NOTES
Physical Therapy  Facility/Department: [facility] MED SURG  Daily Treatment Note  NAME: Jose Palma  : 1945  MRN: [id number]    Date of Service: 2025    Discharge Recommendations:  Continue to assess pending progress, Home with assist PRN, Therapy recommended at discharge     Patient Diagnosis(es):     Assessment  Assessment: Cotreated with OT. Patient transitioned supine to sit with extra time and supervision. Completed L LE active and AAROM while seated EOB. Stood with Mod A of 2 and ambulated 5 feet with RW and Min A of 2. Patient propelled w/c to the hallway. Stood with Mod A of 2 and ambulated 15, 7, and 3 feet with RW, Min A of 2, and w/c follow. Patient continues to have difficulty with sit to stand and is easily fatigued during ambulation. Reports pain in L hip with movement and weight-bearing and continues to have difficulty weight-shifting and maintaining upright posture. Patietn propelled w/c back to the room and completed w/c pushups.  Activity Tolerance: Patient limited by pain;Patient limited by fatigue;Patient limited by endurance    Plan  Physical Therapy Plan  General Plan: 3-5 times per week  Days Per Week: 5 Days  Therapy Duration: 2 Weeks  Current Treatment Recommendations: Strengthening;ROM;Balance training;Functional mobility training;Transfer training;Endurance training;Pain management;Stair training;Gait training;Home exercise program;Safety education & training;Patient/Caregiver education & training;Equipment evaluation, education, & procurement;Therapeutic activities    Restrictions  Restrictions/Precautions  Restrictions/Precautions: General Precautions  Required Braces or Orthoses?: No     Subjective   Subjective  Subjective: Patient presents awake in bed, NAD. \"I don't know what I can do today girls. I have in my mind that I can do it and then I can't.\"    Objective  Bed Mobility Training  Bed Mobility Training: Yes  Overall Level of Assistance: Supervision  Interventions:

## 2025-06-25 PROCEDURE — 6370000000 HC RX 637 (ALT 250 FOR IP): Performed by: INTERNAL MEDICINE

## 2025-06-25 PROCEDURE — 1200000002 HC SEMI PRIVATE SWING BED

## 2025-06-25 PROCEDURE — 97116 GAIT TRAINING THERAPY: CPT

## 2025-06-25 PROCEDURE — 6360000002 HC RX W HCPCS: Performed by: NURSE PRACTITIONER

## 2025-06-25 PROCEDURE — 97530 THERAPEUTIC ACTIVITIES: CPT

## 2025-06-25 PROCEDURE — 6360000002 HC RX W HCPCS: Performed by: INTERNAL MEDICINE

## 2025-06-25 PROCEDURE — 2580000003 HC RX 258: Performed by: NURSE PRACTITIONER

## 2025-06-25 PROCEDURE — 6370000000 HC RX 637 (ALT 250 FOR IP): Performed by: NURSE PRACTITIONER

## 2025-06-25 RX ADMIN — CARBOXYMETHYLCELLULOSE SODIUM 1 DROP: 0.5 SOLUTION/ DROPS OPHTHALMIC at 08:27

## 2025-06-25 RX ADMIN — ENOXAPARIN SODIUM 30 MG: 100 INJECTION SUBCUTANEOUS at 08:18

## 2025-06-25 RX ADMIN — ENOXAPARIN SODIUM 30 MG: 100 INJECTION SUBCUTANEOUS at 20:35

## 2025-06-25 RX ADMIN — CARVEDILOL 12.5 MG: 12.5 TABLET, FILM COATED ORAL at 20:36

## 2025-06-25 RX ADMIN — CARBOXYMETHYLCELLULOSE SODIUM 1 DROP: 0.5 SOLUTION/ DROPS OPHTHALMIC at 20:36

## 2025-06-25 RX ADMIN — ROSUVASTATIN 40 MG: 20 TABLET, FILM COATED ORAL at 20:36

## 2025-06-25 RX ADMIN — SODIUM CHLORIDE 1250 MG: 0.9 INJECTION, SOLUTION INTRAVENOUS at 17:38

## 2025-06-25 RX ADMIN — FUROSEMIDE 40 MG: 40 TABLET ORAL at 08:17

## 2025-06-25 RX ADMIN — METHOCARBAMOL 1000 MG: 500 TABLET ORAL at 20:36

## 2025-06-25 RX ADMIN — CEPHALEXIN 500 MG: 500 CAPSULE ORAL at 12:10

## 2025-06-25 RX ADMIN — OXYCODONE 5 MG: 5 TABLET ORAL at 20:36

## 2025-06-25 RX ADMIN — METHOCARBAMOL 1000 MG: 500 TABLET ORAL at 08:17

## 2025-06-25 RX ADMIN — Medication 1 CAPSULE: at 08:18

## 2025-06-25 RX ADMIN — Medication: at 08:20

## 2025-06-25 RX ADMIN — PANTOPRAZOLE SODIUM 40 MG: 40 TABLET, DELAYED RELEASE ORAL at 06:16

## 2025-06-25 RX ADMIN — OXYCODONE 5 MG: 5 TABLET ORAL at 09:37

## 2025-06-25 RX ADMIN — GUAIFENESIN 600 MG: 600 TABLET, EXTENDED RELEASE ORAL at 08:18

## 2025-06-25 RX ADMIN — CARBOXYMETHYLCELLULOSE SODIUM 1 DROP: 0.5 SOLUTION/ DROPS OPHTHALMIC at 17:42

## 2025-06-25 RX ADMIN — GUAIFENESIN 600 MG: 600 TABLET, EXTENDED RELEASE ORAL at 20:36

## 2025-06-25 RX ADMIN — GABAPENTIN 300 MG: 300 CAPSULE ORAL at 08:16

## 2025-06-25 RX ADMIN — DICLOFENAC SODIUM 2 G: 10 GEL TOPICAL at 20:36

## 2025-06-25 RX ADMIN — CILOSTAZOL 50 MG: 100 TABLET ORAL at 20:36

## 2025-06-25 RX ADMIN — METHOCARBAMOL 1000 MG: 500 TABLET ORAL at 14:25

## 2025-06-25 RX ADMIN — CEPHALEXIN 500 MG: 500 CAPSULE ORAL at 06:16

## 2025-06-25 RX ADMIN — GABAPENTIN 300 MG: 300 CAPSULE ORAL at 20:36

## 2025-06-25 RX ADMIN — DICLOFENAC SODIUM 2 G: 10 GEL TOPICAL at 08:19

## 2025-06-25 RX ADMIN — CILOSTAZOL 50 MG: 100 TABLET ORAL at 08:17

## 2025-06-25 RX ADMIN — FERROUS SULFATE TAB EC 324 MG (65 MG FE EQUIVALENT) 324 MG: 324 (65 FE) TABLET DELAYED RESPONSE at 08:18

## 2025-06-25 RX ADMIN — CYANOCOBALAMIN TAB 500 MCG 1000 MCG: 500 TAB at 08:18

## 2025-06-25 RX ADMIN — CARBOXYMETHYLCELLULOSE SODIUM 1 DROP: 0.5 SOLUTION/ DROPS OPHTHALMIC at 12:10

## 2025-06-25 RX ADMIN — Medication: at 20:46

## 2025-06-25 RX ADMIN — Medication: at 20:44

## 2025-06-25 RX ADMIN — Medication 10 MG: at 20:36

## 2025-06-25 RX ADMIN — ASPIRIN 81 MG: 81 TABLET, CHEWABLE ORAL at 08:18

## 2025-06-25 RX ADMIN — CARVEDILOL 12.5 MG: 12.5 TABLET, FILM COATED ORAL at 08:17

## 2025-06-25 RX ADMIN — GABAPENTIN 300 MG: 300 CAPSULE ORAL at 14:25

## 2025-06-25 RX ADMIN — CEPHALEXIN 500 MG: 500 CAPSULE ORAL at 00:36

## 2025-06-25 RX ADMIN — FOLIC ACID 1 MG: 1 TABLET ORAL at 08:18

## 2025-06-25 ASSESSMENT — PAIN DESCRIPTION - LOCATION: LOCATION: KNEE;HIP

## 2025-06-25 ASSESSMENT — PAIN SCALES - GENERAL
PAINLEVEL_OUTOF10: 5
PAINLEVEL_OUTOF10: 5

## 2025-06-25 NOTE — FLOWSHEET NOTE
06/24/25 2205   Assessment   Charting Type Shift assessment   Psychosocial   Psychosocial (WDL) WDL   Neurological   Neuro (WDL) WDL   Kim Coma Scale   Eye Opening 4   Best Verbal Response 5   Best Motor Response 6   [place] Coma Scale Score 15   HEENT (Head, Ears, Eyes, Nose, & Throat)   HEENT (WDL) X   Right Eye Glasses   Left Eye Glasses   Teeth Dentures upper;Dentures lower   Respiratory   Respiratory (WDL) WDL   Cardiac   Cardiac (WDL) WDL   Gastrointestinal   Abdominal (WDL) X   Abdomen Inspection Rounded   RUQ Bowel Sounds Active   LUQ Bowel Sounds Active   RLQ Bowel Sounds Active   LLQ Bowel Sounds Active   Tenderness No guarding   Genitourinary   Genitourinary (WDL) WDL   Urine Assessment   Urine Color Yellow/straw   Urine Appearance Clear   Urine Odor   (strong NH3 odor)   Peripheral Vascular   Peripheral Vascular (WDL) X   RLE Edema Trace   LLE Edema Trace   Anti-Embolism   Anti-Embolism Intervention Medication   Skin Integumentary    Skin Color Pink   Skin Condition/Temp Warm;Dry   Skin Integrity Incision (see LDA)   Skin Integumentary (WDL) X   Musculoskeletal   RL Extremity Weakness;Unsteady   LL Extremity Weakness;Unsteady   Musculoskeletal (WDL) X   Musculoskeletal Details   L Hip Surgery   Wound 06/17/25 Heel Right   Date First Assessed/Time First Assessed: 06/17/25 0800   Primary Wound Type: Pressure Injury  Location: Heel  Wound Location Orientation: Right   Wound Etiology Deep tissue/Injury   Dressing Status Clean;Dry;Intact   Wound Cleansed Vashe   Dressing/Treatment Foam;Gauze dressing/dressing sponge;Roll gauze   Dressing Change Due 06/25/25   Wound Assessment Purple/maroon;Fluid filled blister   Drainage Amount None (dry)   Odor None   Iesha-wound Assessment Non-blanchable erythema   Wound 06/13/25 Hip Left;Proximal;Upper   Date First Assessed: 06/13/25   Present on Original Admission: Yes  Location: Hip  Wound Location Orientation: Left;Proximal;Upper   Wound Etiology Surgical

## 2025-06-25 NOTE — CONSULTS
Pharmacy Note  Vancomycin Consult    Jose Palma is a 79 y.o. male started on Vancomycin for MRSA SSTI; consult received from SHANTA Ramirez to manage therapy. Also receiving the following antibiotics: N/A.    Principal Problem:    Declining functional status  Active Problems:    Benign essential HTN    Coronary artery disease involving native coronary artery of native heart without angina pectoris    Acute renal failure superimposed on stage 3 chronic kidney disease (HCC)    History of fracture of left hip    Anemia    PAD (peripheral artery disease)    Chronic C6 motor radiculopathy    Chronic bilateral low back pain    Cardiac murmur    AAA (abdominal aortic aneurysm)    Vitamin D deficiency  Resolved Problems:    * No resolved hospital problems. *      Allergies:  Patient has no known allergies.     Temp max: afebrile    Recent Labs     06/23/25  0357   BUN 25*       Recent Labs     06/23/25  0357   CREATININE 1.7*       Recent Labs     06/23/25  0357   WBC 8.7         Intake/Output Summary (Last 24 hours) at 6/25/2025 1619  Last data filed at 6/25/2025 1205  Gross per 24 hour   Intake 873 ml   Output --   Net 873 ml       Culture Date Source Results   6/24/2025 Surgical wound MRSA   N/A N/A N/A   N/A N/A N/A       Ht Readings from Last 1 Encounters:   06/24/25 1.829 m (6')        Wt Readings from Last 1 Encounters:   06/25/25 112.8 kg (248 lb 9.6 oz)         Body mass index is 33.72 kg/m².    Estimated Creatinine Clearance: 47 mL/min (A) (based on SCr of 1.7 mg/dL (H)).      Assessment/Plan:  Will initiate vancomycin 1250 mg IV every 24 hours.  Timing of trough level will be determined based on culture results, renal function, and clinical response.      Thank you for the consult.  Will continue to follow.    Electronically signed by Radha Wisdom RPH on 6/25/2025 at 4:19 PM

## 2025-06-25 NOTE — PLAN OF CARE
Problem: Safety - Adult  Goal: Free from fall injury  Outcome: Progressing     Problem: Nutrition Deficit:  Goal: Optimize nutritional status  Outcome: Progressing  Flowsheets (Taken 6/24/2025 1553 by Delmy Henry RD)  Nutrient intake appropriate for improving, restoring, or maintaining nutritional needs: Monitor oral intake, labs, and treatment plans

## 2025-06-25 NOTE — PROGRESS NOTES
Occupational Therapy  Facility/Department: [facility] MED SURG  Daily Treatment Note  NAME: Jose Palma  : 1945  MRN: [id number]    Date of Service: 2025    Discharge Recommendations:  Continue to assess pending progress     Patient Diagnosis(es): There were no encounter diagnoses.     Assessment   Assessment: Pt received in bed on this date. Pt had not had pain medication. Requested pain medication and nurse administered. Pt transferred to sitting from supine with SBA. Pt sat unsupported at EOB. Pt transferred from sit to stand with bed elevated with min assist x2. Pt transferred to WC. Pt propelled to hallway in WC. Airex pad placed in chair to assist with come to stand with decreased difficulty. Pt come to stand with Min x2 assist. Pt ambulated 7, 14, 11, 9 feet in hallway with continued c/o pain. Pt continues to have difficulty with weight shifting. Pt self propelled back to room. Pt reported fatigue. Pt with visitor present in room Call light in reach upon exit.  Activity Tolerance: Patient tolerated treatment well;Patient limited by pain;Patient limited by endurance    Subjective   This is better today right     Objective  Vitals     Bed Mobility Training  Bed Mobility Training: Yes  Overall Level of Assistance: Supervision  Rolling: Supervision  Supine to Sit: Supervision  Scooting: Supervision  Balance  Sitting: Intact  Standing: With support;Impaired  Transfer Training  Transfer Training: Yes  Overall Level of Assistance: Minimal assistance;Partial/Moderate assistance;2 Person assistance  Interventions: Verbal cues;Safety awareness training  Sit to Stand: Minimal assistance;Partial/Moderate assistance;2 Person assistance  Stand to Sit: Minimal assistance;2 Person assistance  Stand Pivot Transfers: Minimal assistance;Partial/Moderate assistance;2 Person assistance  Bed to Chair: Minimal assistance;Partial/Moderate assistance;2 Person assistance  Gait  Gait Training: Yes  Overall Level of

## 2025-06-25 NOTE — FLOWSHEET NOTE
06/25/25 1430   Wound 06/17/25 Heel Right   Date First Assessed/Time First Assessed: 06/17/25 0800   Primary Wound Type: Pressure Injury  Location: Heel  Wound Location Orientation: Right   Wound Etiology Deep tissue/Injury   Dressing Status New dressing applied   Wound Cleansed Soap and water   Dressing/Treatment Gauze dressing/dressing sponge;Protective barrier;Roll gauze   Dressing Change Due 06/26/25   Wound Assessment Pink/red   Drainage Amount None (dry)   Odor None   Wound 06/13/25 Hip Left;Proximal;Upper   Date First Assessed: 06/13/25   Present on Original Admission: Yes  Location: Hip  Wound Location Orientation: Left;Proximal;Upper   Wound Etiology Surgical   Dressing Status New dressing applied   Wound Cleansed Soap and water;Vashe   Dressing/Treatment Tegaderm/transparent film dressing;Gauze dressing/dressing sponge   Dressing Change Due 06/26/25   Wound Assessment Pink/red   Drainage Amount Scant (moist but unmeasurable)   Drainage Description Serosanguinous   Odor None   Iesha-wound Assessment Blanchable erythema   Wound 06/13/25 Hip Left;Lower   Date First Assessed: 06/13/25   Present on Original Admission: Yes  Location: Hip  Wound Location Orientation: Left;Lower   Wound Etiology Surgical   Dressing/Treatment Open to air   Iesha-wound Assessment Blanchable erythema

## 2025-06-25 NOTE — PROGRESS NOTES
Physical Therapy  Facility/Department: [facility] MED SURG  Daily Treatment Note  NAME: Jose Palma  : 1945  MRN: [id number]    Date of Service: 2025    Discharge Recommendations:  Continue to assess pending progress, Home with assist PRN, Therapy recommended at discharge      Patient Diagnosis(es): There were no encounter diagnoses.    Assessment  Assessment: Patient completed bed mobility tasks with supervision and extra time. Cotreated with OT. Patient stood with Min/Mod A of 2 and transferred with RW to the w/c. Ambulated in the hallway 7, 14, 11, and 9 feet with RW, Min A of 2, and w/c follow.  Activity Tolerance: Patient tolerated treatment well;Patient limited by pain;Patient limited by endurance    Plan  Physical Therapy Plan  General Plan: 3-5 times per week  Days Per Week: 5 Days  Therapy Duration: 2 Weeks  Current Treatment Recommendations: Strengthening;ROM;Balance training;Functional mobility training;Transfer training;Endurance training;Pain management;Stair training;Gait training;Home exercise program;Safety education & training;Patient/Caregiver education & training;Equipment evaluation, education, & procurement;Therapeutic activities    Restrictions  Restrictions/Precautions  Restrictions/Precautions: General Precautions  Required Braces or Orthoses?: No     Subjective   Subjective  Subjective: Patient presents awake in bed, NAD. States he feels a little better today.    Objective  Bed Mobility Training  Bed Mobility Training: Yes  Overall Level of Assistance: Supervision  Rolling: Supervision  Supine to Sit: Supervision  Scooting: Supervision  Balance  Sitting: Intact  Standing: With support;Impaired  Transfer Training  Transfer Training: Yes  Overall Level of Assistance: Minimal assistance;Partial/Moderate assistance;2 Person assistance  Interventions: Verbal cues;Safety awareness training  Sit to Stand: Minimal assistance;Partial/Moderate assistance;2 Person assistance  Stand to

## 2025-06-25 NOTE — PLAN OF CARE
Problem: Safety - Adult  Goal: Free from fall injury  6/25/2025 0920 by Cassie Jones RN  Outcome: Progressing  6/25/2025 0259 by Ricky Zafar RN  Outcome: Progressing     Problem: Nutrition Deficit:  Goal: Optimize nutritional status  6/25/2025 0920 by Cassie Jones RN  Outcome: Progressing  6/25/2025 0259 by Ricky Zafar RN  Outcome: Progressing  Flowsheets (Taken 6/24/2025 1553 by Delmy Henry, KAPIL)  Nutrient intake appropriate for improving, restoring, or maintaining nutritional needs: Monitor oral intake, labs, and treatment plans

## 2025-06-25 NOTE — PROGRESS NOTES
I updated patient and his wife on positive wound culture showing MRSA.  Due to patient's worsening pain, worsening wound and overall progress-recommend IV vancomycin.  Agreeable to plans.  They report their time is up tomorrow for their insurance and they will [facility] to possibly [facility] him for further care due to his insurance. Long conversation.  Verbalized understanding.     Lorelei Tompkins NP

## 2025-06-25 NOTE — FLOWSHEET NOTE
sponge;Roll gauze   Dressing Change Due 06/25/25   Wound 06/13/25 Hip Left;Proximal;Upper   Date First Assessed: 06/13/25   Present on Original Admission: Yes  Location: Hip  Wound Location Orientation: Left;Proximal;Upper   Wound Etiology Surgical   Dressing Status Clean;Dry;Intact   Dressing/Treatment   (see orders)   Dressing Change Due 06/25/25   Wound 06/13/25 Hip Left;Lower   Date First Assessed: 06/13/25   Present on Original Admission: Yes  Location: Hip  Wound Location Orientation: Left;Lower   Wound Etiology Surgical   Dressing Status Other (Comment)  (TURNER)   Dressing/Treatment Open to air   Care Plan - Discharge Planning Goals   Discharge to home or other facility with appropriate resources Identify barriers to discharge with patient and caregiver;Identify discharge learning needs (meds, wound care, etc)

## 2025-06-26 ENCOUNTER — HOSPITAL ENCOUNTER (INPATIENT)
Facility: HOSPITAL | Age: 80
LOS: 4 days | Discharge: ANOTHER HOSPITAL | DRG: 863 | End: 2025-06-30
Attending: INTERNAL MEDICINE | Admitting: INTERNAL MEDICINE
Payer: OTHER GOVERNMENT

## 2025-06-26 VITALS
BODY MASS INDEX: 33.78 KG/M2 | HEIGHT: 72 IN | DIASTOLIC BLOOD PRESSURE: 58 MMHG | SYSTOLIC BLOOD PRESSURE: 110 MMHG | OXYGEN SATURATION: 93 % | TEMPERATURE: 97.5 F | WEIGHT: 249.44 LBS | HEART RATE: 63 BPM | RESPIRATION RATE: 18 BRPM

## 2025-06-26 PROBLEM — A49.02 MRSA INFECTION: Status: ACTIVE | Noted: 2025-06-26

## 2025-06-26 LAB
ALBUMIN SERPL-MCNC: 3.1 G/DL (ref 3.4–4.8)
ALBUMIN/GLOB SERPL: 0.9 {RATIO} (ref 0.8–2)
ALP SERPL-CCNC: 111 U/L (ref 25–100)
ALT SERPL-CCNC: 7 U/L (ref 4–36)
ANION GAP SERPL CALCULATED.3IONS-SCNC: 12 MMOL/L (ref 3–16)
AST SERPL-CCNC: 12 U/L (ref 8–33)
BILIRUB SERPL-MCNC: 0.3 MG/DL (ref 0.3–1.2)
BUN SERPL-MCNC: 32 MG/DL (ref 6–20)
CALCIUM SERPL-MCNC: 8.9 MG/DL (ref 8.3–10.6)
CHLORIDE SERPL-SCNC: 102 MMOL/L (ref 98–107)
CO2 SERPL-SCNC: 23 MMOL/L (ref 20–30)
CREAT SERPL-MCNC: 1.8 MG/DL (ref 0.8–1.3)
ERYTHROCYTE [DISTWIDTH] IN BLOOD BY AUTOMATED COUNT: 14.8 % (ref 11–16)
GFR SERPLBLD CREATININE-BSD FMLA CKD-EPI: 38 ML/MIN/{1.73_M2}
GLOBULIN SER CALC-MCNC: 3.6 G/DL
GLUCOSE SERPL-MCNC: 93 MG/DL (ref 74–106)
HCT VFR BLD AUTO: 30 % (ref 40–54)
HGB BLD-MCNC: 9.5 G/DL (ref 13–18)
MAGNESIUM SERPL-MCNC: 2 MG/DL (ref 1.7–2.4)
MCH RBC QN AUTO: 30.3 PG (ref 27–32)
MCHC RBC AUTO-ENTMCNC: 31.7 G/DL (ref 31–35)
MCV RBC AUTO: 95.5 FL (ref 80–100)
PLATELET # BLD AUTO: 213 K/UL (ref 150–400)
PMV BLD AUTO: 9.3 FL (ref 6–10)
POTASSIUM SERPL-SCNC: 3.1 MMOL/L (ref 3.4–5.1)
PROCALCITONIN SERPL IA-MCNC: 0.13 NG/ML (ref 0–0.15)
PROT SERPL-MCNC: 6.7 G/DL (ref 6.4–8.3)
RBC # BLD AUTO: 3.14 M/UL (ref 4.5–6)
SODIUM SERPL-SCNC: 137 MMOL/L (ref 136–145)
WBC # BLD AUTO: 6 K/UL (ref 4–11)

## 2025-06-26 PROCEDURE — 97161 PT EVAL LOW COMPLEX 20 MIN: CPT

## 2025-06-26 PROCEDURE — 6360000002 HC RX W HCPCS: Performed by: INTERNAL MEDICINE

## 2025-06-26 PROCEDURE — 94761 N-INVAS EAR/PLS OXIMETRY MLT: CPT

## 2025-06-26 PROCEDURE — 1200000000 HC SEMI PRIVATE

## 2025-06-26 PROCEDURE — 97116 GAIT TRAINING THERAPY: CPT

## 2025-06-26 PROCEDURE — 83735 ASSAY OF MAGNESIUM: CPT

## 2025-06-26 PROCEDURE — 85027 COMPLETE CBC AUTOMATED: CPT

## 2025-06-26 PROCEDURE — 6370000000 HC RX 637 (ALT 250 FOR IP): Performed by: NURSE PRACTITIONER

## 2025-06-26 PROCEDURE — 36415 COLL VENOUS BLD VENIPUNCTURE: CPT

## 2025-06-26 PROCEDURE — 6360000002 HC RX W HCPCS: Performed by: NURSE PRACTITIONER

## 2025-06-26 PROCEDURE — 97530 THERAPEUTIC ACTIVITIES: CPT

## 2025-06-26 PROCEDURE — 99239 HOSP IP/OBS DSCHRG MGMT >30: CPT | Performed by: INTERNAL MEDICINE

## 2025-06-26 PROCEDURE — 2580000003 HC RX 258: Performed by: NURSE PRACTITIONER

## 2025-06-26 PROCEDURE — 97166 OT EVAL MOD COMPLEX 45 MIN: CPT

## 2025-06-26 PROCEDURE — 84145 PROCALCITONIN (PCT): CPT

## 2025-06-26 PROCEDURE — 80053 COMPREHEN METABOLIC PANEL: CPT

## 2025-06-26 PROCEDURE — 6370000000 HC RX 637 (ALT 250 FOR IP): Performed by: INTERNAL MEDICINE

## 2025-06-26 RX ORDER — HONEY 100 %
PASTE (ML) TOPICAL DAILY
Status: CANCELLED | OUTPATIENT
Start: 2025-06-26

## 2025-06-26 RX ORDER — OXYCODONE HYDROCHLORIDE 5 MG/1
5 TABLET ORAL EVERY 6 HOURS PRN
Refills: 0 | Status: CANCELLED | OUTPATIENT
Start: 2025-06-26

## 2025-06-26 RX ORDER — MECOBALAMIN 5000 MCG
10 TABLET,DISINTEGRATING ORAL NIGHTLY PRN
Status: DISCONTINUED | OUTPATIENT
Start: 2025-06-26 | End: 2025-06-30 | Stop reason: HOSPADM

## 2025-06-26 RX ORDER — LACTOBACILLUS RHAMNOSUS GG 10B CELL
1 CAPSULE ORAL
Status: DISCONTINUED | OUTPATIENT
Start: 2025-06-27 | End: 2025-06-30 | Stop reason: HOSPADM

## 2025-06-26 RX ORDER — CARVEDILOL 12.5 MG/1
12.5 TABLET ORAL 2 TIMES DAILY
Status: DISCONTINUED | OUTPATIENT
Start: 2025-06-26 | End: 2025-06-28

## 2025-06-26 RX ORDER — PANTOPRAZOLE SODIUM 40 MG/1
40 TABLET, DELAYED RELEASE ORAL
Status: CANCELLED | OUTPATIENT
Start: 2025-06-27

## 2025-06-26 RX ORDER — OXYCODONE HYDROCHLORIDE 5 MG/1
5 TABLET ORAL EVERY 6 HOURS PRN
Refills: 0 | Status: DISCONTINUED | OUTPATIENT
Start: 2025-06-26 | End: 2025-06-30 | Stop reason: HOSPADM

## 2025-06-26 RX ORDER — ROSUVASTATIN CALCIUM 20 MG/1
40 TABLET, COATED ORAL NIGHTLY
Status: CANCELLED | OUTPATIENT
Start: 2025-06-26

## 2025-06-26 RX ORDER — METHOCARBAMOL 500 MG/1
1000 TABLET, FILM COATED ORAL 3 TIMES DAILY
Status: DISCONTINUED | OUTPATIENT
Start: 2025-06-26 | End: 2025-06-30 | Stop reason: HOSPADM

## 2025-06-26 RX ORDER — ACETAMINOPHEN 325 MG/1
650 TABLET ORAL EVERY 4 HOURS PRN
Status: DISCONTINUED | OUTPATIENT
Start: 2025-06-26 | End: 2025-06-30 | Stop reason: HOSPADM

## 2025-06-26 RX ORDER — CARVEDILOL 12.5 MG/1
12.5 TABLET ORAL 2 TIMES DAILY
Status: CANCELLED | OUTPATIENT
Start: 2025-06-26

## 2025-06-26 RX ORDER — ASPIRIN 81 MG/1
81 TABLET, CHEWABLE ORAL DAILY
Status: DISCONTINUED | OUTPATIENT
Start: 2025-06-27 | End: 2025-06-30 | Stop reason: HOSPADM

## 2025-06-26 RX ORDER — GUAIFENESIN 600 MG/1
600 TABLET, EXTENDED RELEASE ORAL 2 TIMES DAILY
Status: COMPLETED | OUTPATIENT
Start: 2025-06-26 | End: 2025-06-28

## 2025-06-26 RX ORDER — ASPIRIN 81 MG/1
81 TABLET, CHEWABLE ORAL DAILY
Status: CANCELLED | OUTPATIENT
Start: 2025-06-27

## 2025-06-26 RX ORDER — NICOTINE 21 MG/24HR
1 PATCH, TRANSDERMAL 24 HOURS TRANSDERMAL DAILY
Status: CANCELLED | OUTPATIENT
Start: 2025-06-27

## 2025-06-26 RX ORDER — CARBOXYMETHYLCELLULOSE SODIUM 5 MG/ML
1 SOLUTION/ DROPS OPHTHALMIC 4 TIMES DAILY
Status: DISCONTINUED | OUTPATIENT
Start: 2025-06-26 | End: 2025-06-30 | Stop reason: HOSPADM

## 2025-06-26 RX ORDER — METHOCARBAMOL 500 MG/1
1000 TABLET, FILM COATED ORAL 3 TIMES DAILY
Status: CANCELLED | OUTPATIENT
Start: 2025-06-26

## 2025-06-26 RX ORDER — CILOSTAZOL 100 MG/1
50 TABLET ORAL 2 TIMES DAILY
Status: CANCELLED | OUTPATIENT
Start: 2025-06-26

## 2025-06-26 RX ORDER — GABAPENTIN 300 MG/1
300 CAPSULE ORAL 3 TIMES DAILY
Status: DISCONTINUED | OUTPATIENT
Start: 2025-06-26 | End: 2025-06-30 | Stop reason: HOSPADM

## 2025-06-26 RX ORDER — GABAPENTIN 300 MG/1
300 CAPSULE ORAL 3 TIMES DAILY
Status: CANCELLED | OUTPATIENT
Start: 2025-06-26

## 2025-06-26 RX ORDER — NICOTINE 21 MG/24HR
1 PATCH, TRANSDERMAL 24 HOURS TRANSDERMAL DAILY
Status: DISCONTINUED | OUTPATIENT
Start: 2025-06-27 | End: 2025-06-30 | Stop reason: HOSPADM

## 2025-06-26 RX ORDER — ACETAMINOPHEN 325 MG/1
650 TABLET ORAL EVERY 4 HOURS PRN
Status: CANCELLED | OUTPATIENT
Start: 2025-06-26

## 2025-06-26 RX ORDER — LACTOBACILLUS RHAMNOSUS GG 10B CELL
1 CAPSULE ORAL
Status: CANCELLED | OUTPATIENT
Start: 2025-06-27

## 2025-06-26 RX ORDER — FUROSEMIDE 40 MG/1
40 TABLET ORAL DAILY
Status: DISCONTINUED | OUTPATIENT
Start: 2025-06-27 | End: 2025-06-30 | Stop reason: HOSPADM

## 2025-06-26 RX ORDER — ENOXAPARIN SODIUM 100 MG/ML
30 INJECTION SUBCUTANEOUS 2 TIMES DAILY
Status: CANCELLED | OUTPATIENT
Start: 2025-06-26

## 2025-06-26 RX ORDER — PANTOPRAZOLE SODIUM 40 MG/1
40 TABLET, DELAYED RELEASE ORAL
Status: DISCONTINUED | OUTPATIENT
Start: 2025-06-27 | End: 2025-06-30 | Stop reason: HOSPADM

## 2025-06-26 RX ORDER — CILOSTAZOL 100 MG/1
50 TABLET ORAL 2 TIMES DAILY
Status: DISCONTINUED | OUTPATIENT
Start: 2025-06-26 | End: 2025-06-30 | Stop reason: HOSPADM

## 2025-06-26 RX ORDER — MECOBALAMIN 5000 MCG
10 TABLET,DISINTEGRATING ORAL NIGHTLY PRN
Status: CANCELLED | OUTPATIENT
Start: 2025-06-26

## 2025-06-26 RX ORDER — MULTIVITAMIN WITH IRON
1000 TABLET ORAL DAILY
Status: DISCONTINUED | OUTPATIENT
Start: 2025-06-27 | End: 2025-06-30 | Stop reason: HOSPADM

## 2025-06-26 RX ORDER — SENNOSIDES 8.6 MG/1
2 TABLET ORAL NIGHTLY PRN
Status: DISCONTINUED | OUTPATIENT
Start: 2025-06-26 | End: 2025-06-30 | Stop reason: HOSPADM

## 2025-06-26 RX ORDER — SODIUM CHLOR/HYPOCHLOROUS ACID 0.033 %
SOLUTION, IRRIGATION IRRIGATION DAILY PRN
Status: DISCONTINUED | OUTPATIENT
Start: 2025-06-26 | End: 2025-06-30 | Stop reason: HOSPADM

## 2025-06-26 RX ORDER — SIMETHICONE 80 MG
80 TABLET,CHEWABLE ORAL 3 TIMES DAILY PRN
Status: CANCELLED | OUTPATIENT
Start: 2025-06-26

## 2025-06-26 RX ORDER — ERGOCALCIFEROL 1.25 MG/1
50000 CAPSULE, LIQUID FILLED ORAL WEEKLY
Status: DISCONTINUED | OUTPATIENT
Start: 2025-06-28 | End: 2025-06-30 | Stop reason: HOSPADM

## 2025-06-26 RX ORDER — POLYETHYLENE GLYCOL 3350 17 G/17G
17 POWDER, FOR SOLUTION ORAL DAILY PRN
Status: DISCONTINUED | OUTPATIENT
Start: 2025-06-26 | End: 2025-06-30 | Stop reason: HOSPADM

## 2025-06-26 RX ORDER — FOLIC ACID 1 MG/1
1 TABLET ORAL DAILY
Status: DISCONTINUED | OUTPATIENT
Start: 2025-06-27 | End: 2025-06-30 | Stop reason: HOSPADM

## 2025-06-26 RX ORDER — GUAIFENESIN 600 MG/1
600 TABLET, EXTENDED RELEASE ORAL 2 TIMES DAILY
Status: CANCELLED | OUTPATIENT
Start: 2025-06-26 | End: 2025-06-28

## 2025-06-26 RX ORDER — SODIUM CHLOR/HYPOCHLOROUS ACID 0.033 %
SOLUTION, IRRIGATION IRRIGATION DAILY
Status: CANCELLED | OUTPATIENT
Start: 2025-06-26

## 2025-06-26 RX ORDER — RIVASTIGMINE 9.5 MG/24H
1 PATCH, EXTENDED RELEASE TRANSDERMAL DAILY
Status: DISCONTINUED | OUTPATIENT
Start: 2025-06-27 | End: 2025-06-30 | Stop reason: HOSPADM

## 2025-06-26 RX ORDER — RIVASTIGMINE 9.5 MG/24H
1 PATCH, EXTENDED RELEASE TRANSDERMAL DAILY
Status: CANCELLED | OUTPATIENT
Start: 2025-06-27

## 2025-06-26 RX ORDER — POLYETHYLENE GLYCOL 3350 17 G/17G
17 POWDER, FOR SOLUTION ORAL DAILY PRN
Status: CANCELLED | OUTPATIENT
Start: 2025-06-26

## 2025-06-26 RX ORDER — SODIUM CHLOR/HYPOCHLOROUS ACID 0.033 %
SOLUTION, IRRIGATION IRRIGATION DAILY
Status: DISCONTINUED | OUTPATIENT
Start: 2025-06-27 | End: 2025-06-30 | Stop reason: HOSPADM

## 2025-06-26 RX ORDER — ENOXAPARIN SODIUM 100 MG/ML
30 INJECTION SUBCUTANEOUS 2 TIMES DAILY
Status: DISCONTINUED | OUTPATIENT
Start: 2025-06-26 | End: 2025-06-30 | Stop reason: HOSPADM

## 2025-06-26 RX ORDER — ONDANSETRON 4 MG/1
4 TABLET, ORALLY DISINTEGRATING ORAL 3 TIMES DAILY PRN
Status: CANCELLED | OUTPATIENT
Start: 2025-06-26

## 2025-06-26 RX ORDER — FOLIC ACID 1 MG/1
1 TABLET ORAL DAILY
Status: CANCELLED | OUTPATIENT
Start: 2025-06-27

## 2025-06-26 RX ORDER — FUROSEMIDE 40 MG/1
40 TABLET ORAL DAILY
Status: CANCELLED | OUTPATIENT
Start: 2025-06-27

## 2025-06-26 RX ORDER — FERROUS SULFATE 324(65)MG
324 TABLET, DELAYED RELEASE (ENTERIC COATED) ORAL
Status: CANCELLED | OUTPATIENT
Start: 2025-06-27

## 2025-06-26 RX ORDER — SIMETHICONE 80 MG
80 TABLET,CHEWABLE ORAL 3 TIMES DAILY PRN
Status: DISCONTINUED | OUTPATIENT
Start: 2025-06-26 | End: 2025-06-30 | Stop reason: HOSPADM

## 2025-06-26 RX ORDER — MULTIVITAMIN WITH IRON
1000 TABLET ORAL DAILY
Status: CANCELLED | OUTPATIENT
Start: 2025-06-27

## 2025-06-26 RX ORDER — HONEY 100 %
PASTE (ML) TOPICAL DAILY
Status: DISCONTINUED | OUTPATIENT
Start: 2025-06-26 | End: 2025-06-30 | Stop reason: HOSPADM

## 2025-06-26 RX ORDER — ROSUVASTATIN CALCIUM 20 MG/1
40 TABLET, COATED ORAL NIGHTLY
Status: DISCONTINUED | OUTPATIENT
Start: 2025-06-26 | End: 2025-06-30 | Stop reason: HOSPADM

## 2025-06-26 RX ORDER — ERGOCALCIFEROL 1.25 MG/1
50000 CAPSULE, LIQUID FILLED ORAL WEEKLY
Status: CANCELLED | OUTPATIENT
Start: 2025-06-28

## 2025-06-26 RX ORDER — CARBOXYMETHYLCELLULOSE SODIUM 5 MG/ML
1 SOLUTION/ DROPS OPHTHALMIC 4 TIMES DAILY
Status: CANCELLED | OUTPATIENT
Start: 2025-06-26

## 2025-06-26 RX ORDER — ONDANSETRON 4 MG/1
4 TABLET, ORALLY DISINTEGRATING ORAL 3 TIMES DAILY PRN
Status: DISCONTINUED | OUTPATIENT
Start: 2025-06-26 | End: 2025-06-30 | Stop reason: HOSPADM

## 2025-06-26 RX ORDER — FERROUS SULFATE 324(65)MG
324 TABLET, DELAYED RELEASE (ENTERIC COATED) ORAL
Status: DISCONTINUED | OUTPATIENT
Start: 2025-06-27 | End: 2025-06-30 | Stop reason: HOSPADM

## 2025-06-26 RX ORDER — SENNOSIDES 8.6 MG/1
2 TABLET ORAL NIGHTLY PRN
Status: CANCELLED | OUTPATIENT
Start: 2025-06-26

## 2025-06-26 RX ORDER — SODIUM CHLOR/HYPOCHLOROUS ACID 0.033 %
SOLUTION, IRRIGATION IRRIGATION DAILY
Status: CANCELLED | OUTPATIENT
Start: 2025-06-27

## 2025-06-26 RX ADMIN — CILOSTAZOL 50 MG: 100 TABLET ORAL at 21:38

## 2025-06-26 RX ADMIN — CARBOXYMETHYLCELLULOSE SODIUM 1 DROP: 0.5 SOLUTION/ DROPS OPHTHALMIC at 08:55

## 2025-06-26 RX ADMIN — GABAPENTIN 300 MG: 300 CAPSULE ORAL at 15:27

## 2025-06-26 RX ADMIN — DICLOFENAC SODIUM 2 G: 10 GEL TOPICAL at 21:36

## 2025-06-26 RX ADMIN — GUAIFENESIN 600 MG: 600 TABLET, EXTENDED RELEASE ORAL at 08:50

## 2025-06-26 RX ADMIN — CILOSTAZOL 50 MG: 100 TABLET ORAL at 08:51

## 2025-06-26 RX ADMIN — FERROUS SULFATE TAB EC 324 MG (65 MG FE EQUIVALENT) 324 MG: 324 (65 FE) TABLET DELAYED RESPONSE at 08:49

## 2025-06-26 RX ADMIN — METHOCARBAMOL 1000 MG: 500 TABLET ORAL at 15:27

## 2025-06-26 RX ADMIN — OXYCODONE 5 MG: 5 TABLET ORAL at 12:39

## 2025-06-26 RX ADMIN — CARVEDILOL 12.5 MG: 12.5 TABLET, FILM COATED ORAL at 21:37

## 2025-06-26 RX ADMIN — DICLOFENAC SODIUM 2 G: 10 GEL TOPICAL at 08:54

## 2025-06-26 RX ADMIN — CARVEDILOL 12.5 MG: 12.5 TABLET, FILM COATED ORAL at 08:50

## 2025-06-26 RX ADMIN — GABAPENTIN 300 MG: 300 CAPSULE ORAL at 21:37

## 2025-06-26 RX ADMIN — OXYCODONE 5 MG: 5 TABLET ORAL at 21:37

## 2025-06-26 RX ADMIN — SODIUM CHLORIDE 1250 MG: 0.9 INJECTION, SOLUTION INTRAVENOUS at 18:30

## 2025-06-26 RX ADMIN — ENOXAPARIN SODIUM 30 MG: 100 INJECTION SUBCUTANEOUS at 21:36

## 2025-06-26 RX ADMIN — METHOCARBAMOL 1000 MG: 500 TABLET ORAL at 21:37

## 2025-06-26 RX ADMIN — PANTOPRAZOLE SODIUM 40 MG: 40 TABLET, DELAYED RELEASE ORAL at 06:44

## 2025-06-26 RX ADMIN — CYANOCOBALAMIN TAB 500 MCG 1000 MCG: 500 TAB at 08:50

## 2025-06-26 RX ADMIN — Medication 1 CAPSULE: at 08:49

## 2025-06-26 RX ADMIN — Medication 10 MG: at 21:37

## 2025-06-26 RX ADMIN — CARBOXYMETHYLCELLULOSE SODIUM 1 DROP: 5 SOLUTION/ DROPS OPHTHALMIC at 21:37

## 2025-06-26 RX ADMIN — ENOXAPARIN SODIUM 30 MG: 100 INJECTION SUBCUTANEOUS at 08:49

## 2025-06-26 RX ADMIN — FOLIC ACID 1 MG: 1 TABLET ORAL at 08:50

## 2025-06-26 RX ADMIN — ASPIRIN 81 MG: 81 TABLET, CHEWABLE ORAL at 08:50

## 2025-06-26 RX ADMIN — GUAIFENESIN 600 MG: 600 TABLET, EXTENDED RELEASE ORAL at 21:37

## 2025-06-26 RX ADMIN — GABAPENTIN 300 MG: 300 CAPSULE ORAL at 08:50

## 2025-06-26 RX ADMIN — FUROSEMIDE 40 MG: 40 TABLET ORAL at 08:49

## 2025-06-26 RX ADMIN — ROSUVASTATIN 40 MG: 20 TABLET, FILM COATED ORAL at 21:37

## 2025-06-26 RX ADMIN — METHOCARBAMOL 1000 MG: 500 TABLET ORAL at 08:49

## 2025-06-26 ASSESSMENT — PAIN DESCRIPTION - ORIENTATION: ORIENTATION: LEFT

## 2025-06-26 ASSESSMENT — PAIN SCALES - GENERAL
PAINLEVEL_OUTOF10: 5
PAINLEVEL_OUTOF10: 5

## 2025-06-26 ASSESSMENT — PAIN DESCRIPTION - LOCATION: LOCATION: LEG

## 2025-06-26 NOTE — PROGRESS NOTES
Rounded to check on patient and offer spiritual care to patient. Told pt to reach out if they needed anything further.

## 2025-06-26 NOTE — PLAN OF CARE
Problem: Safety - Adult  Goal: Free from fall injury  6/25/2025 2055 by Ricky Zafar RN  Outcome: Progressing  6/25/2025 0920 by Cassie Jones RN  Outcome: Progressing     Problem: Nutrition Deficit:  Goal: Optimize nutritional status  6/25/2025 2055 by Ricky Zafar RN  Outcome: Progressing  6/25/2025 0920 by Cassie Jones RN  Outcome: Progressing

## 2025-06-26 NOTE — PROGRESS NOTES
Physical Therapy  Facility/Department: [facility] MED SURG  Physical Therapy Initial Assessment    Name: Jose Palma  : 1945  MRN: [id number]  Date of Service: 2025    Discharge Recommendations:  Continue to assess pending progress, Home with assist PRN, Therapy recommended at discharge          Patient Diagnosis(es): There were no encounter diagnoses.  Past Medical History:  has a past medical history of Cancer (HCC), Heart attack (HCC), and Hypertension.  Past Surgical History:  has a past surgical history that includes Neck surgery (10/28/2009); Hydrocele surgery; Skin cancer excision; and knee surgery (Left, 2018).    Assessment  Body Structures, Functions, Activity Limitations Requiring Skilled Therapeutic Intervention: Decreased functional mobility ;Decreased ROM;Decreased strength;Decreased endurance;Decreased high-level IADLs;Decreased balance;Decreased safe awareness;Increased pain  Assessment: Patient received lying in bed on RA. NAD but with c/o 7/10 L hip pain at rest. Pleasant and agreeable to work with PT.  Able to transition sup to sit with verbal cues and CGA-min A. Good sitting balance. Sit to stand from EOB with RW for support, verbal cues for sequencing and CGA-min  A x 2. Cues to fully extend knees in stand and to attain upright posture. Requests assistance to transfer to w/c so he can use the urinal before attempting ambulation. CGA-min A x 2 with RW and verbal cues to transfer from bed to w/c.  Able to ambulate 24'x1 and 32'x1 with with RW and CGA-min A x2, verbal cues for safety/sequencing and w/c follow for safety. Quick fatigue and increased pain reported today d/t new onset L hip infection. Patient able to propel w/c 80'x1 with SBA. Returned to room and left sitting up in w/c per patient request. He presents with deficits in functional mobility but is expected to benefit from continued skilled PT intervention to improve his overall mobility status prior to D/C.  Treatment

## 2025-06-26 NOTE — PROGRESS NOTES
Occupational Therapy  Facility/Department: [facility] MED SURG  Occupational Therapy Initial Assessment    Name: Jose Palma  : 1945  MRN: [id number]  Date of Service: 2025    Discharge Recommendations:  Continue to assess pending progress  OT Equipment Recommendations  Equipment Needed: No     Patient Diagnosis(es): There were no encounter diagnoses.  Past Medical History:  has a past medical history of Cancer (HCC), Heart attack (HCC), and Hypertension.  Past Surgical History:  has a past surgical history that includes Neck surgery (10/28/2009); Hydrocele surgery; Skin cancer excision; and knee surgery (Left, 2018).    Treatment Diagnosis: declining fx status    Assessment  Performance deficits / Impairments: Decreased functional mobility ;Decreased balance;Decreased ADL status;Decreased posture;Decreased ROM;Decreased endurance;Decreased strength;Decreased sensation  Assessment: This 79 year old male was referred to OT services upon admission following onset of MRSA infection. Pt inpatient in swing bed at this facility after having hip repair after recent fall. Pt transferred to this facility for ongoing rehab. Pt having increased pain and limited mobility with limited progression of activity. Pt wound cultured and found to have positive result. Pt transitioned to inpatient for acute treatment of infection and ongoing rehab services. Pt evaluated on this date. Pt is agreeable to participate in therapy services reports L hip pain 7/10. Pt transferred to sitting at EOB with SUP. Pt sat unsupported. Pt come to stand from EOB with min x2 from elevated surface. Pt transferred from bed to WC with min x2 using RW. Pt used urinal with independence in room. pt self propelled to hallway in WC and come to stand from elevated seat in WC with min x2 and ambulated x32 feet with RW CGA x2 and WC follow. Pt had seated rest break and come to stand again with min<>MOD x2 and ambulated x24 feet with RW and WC

## 2025-06-26 NOTE — PLAN OF CARE
Problem: Discharge Planning  Goal: Discharge to home or other facility with appropriate resources  6/26/2025 1811 by Isadora Bran, RN  Outcome: Progressing  6/26/2025 1718 by Isadora Bran RN  Outcome: Progressing     Problem: Safety - Adult  Goal: Free from fall injury  6/26/2025 1811 by Isadora Bran, RN  Outcome: Progressing  6/26/2025 1718 by Isadora Bran, RN  Outcome: Progressing

## 2025-06-26 NOTE — FLOWSHEET NOTE
06/25/25 2030   Assessment   Charting Type Shift assessment   Psychosocial   Psychosocial (WDL) WDL   Neurological   Neuro (WDL) WDL   Kim Coma Scale   Eye Opening 4   Best Verbal Response 5   Best Motor Response 6   [place] Coma Scale Score 15   HEENT (Head, Ears, Eyes, Nose, & Throat)   HEENT (WDL) X   Right Eye Glasses   Left Eye Glasses   Teeth Dentures upper;Dentures lower   Respiratory   Respiratory (WDL) WDL   Cardiac   Cardiac (WDL) WDL   Gastrointestinal   Abdominal (WDL) X   Abdomen Inspection Rounded   RUQ Bowel Sounds Active   LUQ Bowel Sounds Active   RLQ Bowel Sounds Active   LLQ Bowel Sounds Active   Tenderness No guarding   Genitourinary   Genitourinary (WDL) WDL   Peripheral Vascular   Peripheral Vascular (WDL) X   RLE Edema Trace   LLE Edema Trace   Anti-Embolism   Anti-Embolism Intervention Medication   Skin Integumentary    Skin Color Pink   Skin Condition/Temp Warm;Dry   Skin Integumentary (WDL) X   Musculoskeletal   RL Extremity Weakness;Unsteady   LL Extremity Weakness;Unsteady   Musculoskeletal (WDL) X   Musculoskeletal Details   L Hip Surgery   Wound 06/17/25 Heel Right   Date First Assessed/Time First Assessed: 06/17/25 0800   Primary Wound Type: Pressure Injury  Location: Heel  Wound Location Orientation: Right   Wound Etiology Deep tissue/Injury   Dressing Status Clean;Dry;Intact   Dressing/Treatment Foam;Gauze dressing/dressing sponge;Roll gauze   Dressing Change Due 06/26/25   Wound 06/13/25 Hip Left;Proximal;Upper   Date First Assessed: 06/13/25   Present on Original Admission: Yes  Location: Hip  Wound Location Orientation: Left;Proximal;Upper   Wound Etiology Surgical   Dressing Status Clean;Dry;Intact   Dressing/Treatment   (see orders)   Wound 06/13/25 Hip Left;Lower   Date First Assessed: 06/13/25   Present on Original Admission: Yes  Location: Hip  Wound Location Orientation: Left;Lower   Wound Etiology Surgical   Dressing Status Other (Comment)  (TURNER)   Dressing/Treatment

## 2025-06-26 NOTE — DISCHARGE SUMMARY
Discharge Summary      Patient ID: Jose Palma   :  1945     Patient's PCP: Birdie Suggs APRN    Admit Date: 2025     Discharge Date:   25    Admitting Provider: Vee Robles DO    Discharging Provider: SHANTA Ramirez - CNP     Reason for this admission:   Declining functional status     Discharge Diagnoses:     Active Hospital Problems    Diagnosis Date Noted    Vitamin D deficiency [E55.9] 2025    Acute renal failure superimposed on stage 3 chronic kidney disease (HCC) [N17.9, N18.30] 2025    History of fracture of left hip [Z87.81] 2025    Anemia [D64.9] 2025    PAD (peripheral artery disease) [I73.9] 2025    Chronic C6 motor radiculopathy [M54.10] 2025    Chronic bilateral low back pain [M54.50, G89.29] 2025    Cardiac murmur [R01.1] 2025    AAA (abdominal aortic aneurysm) [I71.40] 2025    Declining functional status [R53.81] 2025    Coronary artery disease involving native coronary artery of native heart without angina pectoris [I25.10] 2017    Benign essential HTN [I10] 2015       Procedures:  none    Consults:   IP CONSULT TO CASE MANAGEMENT  IP CONSULT TO DIETITIAN  PHARMACY TO DOSE VANCOMYCIN      Briefly:   The patient is a 79 y.o. male with past medical history of CAD, PAD, hypertension, CKD 3, AAA, tobacco abuse, chronic lower back pain, chronic C6 motor radiculopathy, and others [facility] following a fall which resulted in a left hip intertrochanteric hip fracture.  He is status post surgical stabilization of the left hip with CMN on 2025. Admitted to swing bed for PT. Cooperative with PT. around  [facility] of worsening pain, gait difficulty and worsening wound changes.  A wound culture was obtained at that time.  [facility] here and started on Keflex.

## 2025-06-27 ENCOUNTER — APPOINTMENT (OUTPATIENT)
Dept: CT IMAGING | Facility: HOSPITAL | Age: 80
DRG: 863 | End: 2025-06-27
Attending: INTERNAL MEDICINE
Payer: OTHER GOVERNMENT

## 2025-06-27 PROBLEM — N18.32 CKD STAGE 3B, GFR 30-44 ML/MIN (HCC): Status: ACTIVE | Noted: 2025-06-27

## 2025-06-27 LAB — CRP SERPL-MCNC: 32.9 MG/L (ref 0–5.1)

## 2025-06-27 PROCEDURE — 1200000000 HC SEMI PRIVATE

## 2025-06-27 PROCEDURE — 6360000002 HC RX W HCPCS: Performed by: NURSE PRACTITIONER

## 2025-06-27 PROCEDURE — 97116 GAIT TRAINING THERAPY: CPT

## 2025-06-27 PROCEDURE — 6370000000 HC RX 637 (ALT 250 FOR IP): Performed by: NURSE PRACTITIONER

## 2025-06-27 PROCEDURE — 36415 COLL VENOUS BLD VENIPUNCTURE: CPT

## 2025-06-27 PROCEDURE — 97530 THERAPEUTIC ACTIVITIES: CPT

## 2025-06-27 PROCEDURE — 73700 CT LOWER EXTREMITY W/O DYE: CPT

## 2025-06-27 PROCEDURE — 99223 1ST HOSP IP/OBS HIGH 75: CPT | Performed by: INTERNAL MEDICINE

## 2025-06-27 PROCEDURE — 97802 MEDICAL NUTRITION INDIV IN: CPT

## 2025-06-27 PROCEDURE — 94761 N-INVAS EAR/PLS OXIMETRY MLT: CPT

## 2025-06-27 PROCEDURE — 2580000003 HC RX 258: Performed by: NURSE PRACTITIONER

## 2025-06-27 PROCEDURE — 97535 SELF CARE MNGMENT TRAINING: CPT

## 2025-06-27 PROCEDURE — 86140 C-REACTIVE PROTEIN: CPT

## 2025-06-27 RX ADMIN — FOLIC ACID 1 MG: 1 TABLET ORAL at 08:26

## 2025-06-27 RX ADMIN — ROSUVASTATIN 40 MG: 20 TABLET, FILM COATED ORAL at 21:43

## 2025-06-27 RX ADMIN — CILOSTAZOL 50 MG: 100 TABLET ORAL at 08:26

## 2025-06-27 RX ADMIN — FUROSEMIDE 40 MG: 40 TABLET ORAL at 08:26

## 2025-06-27 RX ADMIN — CARBOXYMETHYLCELLULOSE SODIUM 1 DROP: 5 SOLUTION/ DROPS OPHTHALMIC at 08:25

## 2025-06-27 RX ADMIN — ENOXAPARIN SODIUM 30 MG: 100 INJECTION SUBCUTANEOUS at 08:25

## 2025-06-27 RX ADMIN — GABAPENTIN 300 MG: 300 CAPSULE ORAL at 21:43

## 2025-06-27 RX ADMIN — GABAPENTIN 300 MG: 300 CAPSULE ORAL at 13:49

## 2025-06-27 RX ADMIN — Medication 1 CAPSULE: at 08:26

## 2025-06-27 RX ADMIN — CARVEDILOL 12.5 MG: 12.5 TABLET, FILM COATED ORAL at 08:26

## 2025-06-27 RX ADMIN — ENOXAPARIN SODIUM 30 MG: 100 INJECTION SUBCUTANEOUS at 21:42

## 2025-06-27 RX ADMIN — Medication 10 MG: at 21:43

## 2025-06-27 RX ADMIN — OXYCODONE 5 MG: 5 TABLET ORAL at 09:34

## 2025-06-27 RX ADMIN — GUAIFENESIN 600 MG: 600 TABLET, EXTENDED RELEASE ORAL at 08:25

## 2025-06-27 RX ADMIN — CARBOXYMETHYLCELLULOSE SODIUM 1 DROP: 5 SOLUTION/ DROPS OPHTHALMIC at 21:42

## 2025-06-27 RX ADMIN — Medication: at 21:54

## 2025-06-27 RX ADMIN — CILOSTAZOL 50 MG: 100 TABLET ORAL at 21:43

## 2025-06-27 RX ADMIN — OXYCODONE 5 MG: 5 TABLET ORAL at 21:43

## 2025-06-27 RX ADMIN — METHOCARBAMOL 1000 MG: 500 TABLET ORAL at 08:25

## 2025-06-27 RX ADMIN — GABAPENTIN 300 MG: 300 CAPSULE ORAL at 08:26

## 2025-06-27 RX ADMIN — Medication: at 21:55

## 2025-06-27 RX ADMIN — SODIUM CHLORIDE 1250 MG: 0.9 INJECTION, SOLUTION INTRAVENOUS at 18:21

## 2025-06-27 RX ADMIN — ASPIRIN 81 MG: 81 TABLET, CHEWABLE ORAL at 08:26

## 2025-06-27 RX ADMIN — CARBOXYMETHYLCELLULOSE SODIUM 1 DROP: 5 SOLUTION/ DROPS OPHTHALMIC at 13:49

## 2025-06-27 RX ADMIN — METHOCARBAMOL 1000 MG: 500 TABLET ORAL at 21:42

## 2025-06-27 RX ADMIN — POLYETHYLENE GLYCOL 3350 17 G: 17 POWDER, FOR SOLUTION ORAL at 08:38

## 2025-06-27 RX ADMIN — CYANOCOBALAMIN TAB 500 MCG 1000 MCG: 500 TAB at 08:25

## 2025-06-27 RX ADMIN — GUAIFENESIN 600 MG: 600 TABLET, EXTENDED RELEASE ORAL at 21:43

## 2025-06-27 RX ADMIN — FERROUS SULFATE TAB EC 324 MG (65 MG FE EQUIVALENT) 324 MG: 324 (65 FE) TABLET DELAYED RESPONSE at 08:26

## 2025-06-27 RX ADMIN — PANTOPRAZOLE SODIUM 40 MG: 40 TABLET, DELAYED RELEASE ORAL at 08:38

## 2025-06-27 RX ADMIN — METHOCARBAMOL 1000 MG: 500 TABLET ORAL at 13:49

## 2025-06-27 RX ADMIN — CARBOXYMETHYLCELLULOSE SODIUM 1 DROP: 5 SOLUTION/ DROPS OPHTHALMIC at 18:24

## 2025-06-27 ASSESSMENT — PAIN DESCRIPTION - ORIENTATION
ORIENTATION: RIGHT
ORIENTATION: LEFT
ORIENTATION: LEFT

## 2025-06-27 ASSESSMENT — PAIN DESCRIPTION - LOCATION
LOCATION: HIP
LOCATION: HIP
LOCATION: HIP;LEG

## 2025-06-27 ASSESSMENT — PAIN SCALES - GENERAL
PAINLEVEL_OUTOF10: 7
PAINLEVEL_OUTOF10: 7
PAINLEVEL_OUTOF10: 5

## 2025-06-27 ASSESSMENT — PAIN DESCRIPTION - DESCRIPTORS: DESCRIPTORS: NAGGING;ACHING;DISCOMFORT

## 2025-06-27 NOTE — PLAN OF CARE
Problem: Discharge Planning  Goal: Discharge to home or other facility with appropriate resources  6/27/2025 0231 by Ricky Zafar RN  Outcome: Progressing  6/26/2025 1811 by Isadora Bran RN  Outcome: Progressing  6/26/2025 1718 by Isadora Bran RN  Outcome: Progressing     Problem: Safety - Adult  Goal: Free from fall injury  6/27/2025 0231 by Ricky Zafar RN  Outcome: Progressing  6/26/2025 1811 by Isadora Bran RN  Outcome: Progressing  6/26/2025 1718 by Isadora Bran RN  Outcome: Progressing     Problem: Skin/Tissue Integrity  Goal: Skin integrity remains intact  Description: 1.  Monitor for areas of redness and/or skin breakdown  2.  Assess vascular access sites hourly  3.  Every 4-6 hours minimum:  Change oxygen saturation probe site  4.  Every 4-6 hours:  If on nasal continuous positive airway pressure, respiratory therapy assess nares and determine need for appliance change or resting period  Outcome: Progressing     Problem: ABCDS Injury Assessment  Goal: Absence of physical injury  Outcome: Progressing

## 2025-06-27 NOTE — CARE COORDINATION
Lives with SO.  Has SC, cane, rollator, RW.  HH and DME per therapy recs.       06/27/25 1055   Service Assessment   Patient Orientation Alert and Oriented   Cognition Alert   History Provided By Patient;Medical Record   Primary Caregiver Self   Support Systems Spouse/Significant Other;Children;Family Members   Patient's Healthcare Decision Maker is: Legal Next of Kin   PCP Verified by CM Yes   Last Visit to PCP Within last 3 months   Prior Functional Level Independent in ADLs/IADLs   Current Functional Level Assistance with the following:  (per therapy)   Can patient return to prior living arrangement Yes   Ability to make needs known: Good   Family able to assist with home care needs: Yes   Would you like for me to discuss the discharge plan with any other family members/significant others, and if so, who? Yes  (SO)   Financial Resources Medicare;[place] ([facility])   Community Resources None   CM/VERO Referral Other (see comment)   Discharge Planning   Type of Residence House   Living Arrangements Spouse/Significant Other   Current Services Prior To Admission Durable Medical Equipment   Current DME Prior to Arrival Shower Chair;Cane;Walker  (SC, cane, rollator, RW)   Potential Assistance Needed Durable Medical Equipment;Home Care   Potential DME Needed Other (Comment)  (per therapy recs)   DME Ordered? No  (at DC)   Potential Assistance Purchasing Medications No   Type of Home Care Services Nursing Services;OT;PT   Patient expects to be discharged to: House   Follow Up Appointment: Best Day/Time  Monday AM   One/Two Story Residence Two story, live on first floor   History of falls? 1

## 2025-06-27 NOTE — PROGRESS NOTES
Occupational Therapy  Facility/Department: [facility] MED SURG  Daily Treatment Note  NAME: Jose Palma  : 1945  MRN: [id number]    Date of Service: 2025    Discharge Recommendations:  Continue to assess pending progress     Patient Diagnosis(es): There were no encounter diagnoses.     Assessment   Assessment: Pt received in bed on this date. Pt received pain medication prior to entry. Pt transferred to sitting from supine position with SUP. Pt sat unsupported at EOB. Pt AMOS with sponge bathing and new clothing. Pt completed UB bathing with AMOS. Pt donned clean shirt, applied deoderant with AMOS. Pt come to stand from elevated surface with MOD x2 to come to stand and required MOD A to doff pants and underwear. Pt able to wash BLE and joyce area with CGA but required assist to wash feet. Pt donned underwear and shorts seated at bedside without reacher with min assist with increased time. Pt donned socks using sock aid with min assist. Pt come to stand from bed and required mod x2 to transfer to  with poor safety. Pt requied max verbal cuing but unable to self correct requiring assistance to pivot to chair. Pt self propelled to hallway. Pt come to stand with MOD x2 assist from chair and ambulated 15 feet. Pt had seated rest break coming to sit with Min x2 assist. Pt come to stand each trial with mod x2 and ambulated 13, 9, 10 feet. Pt attempted one additional trial but unable to weight shift upon coming to stand with MOD x2. Pt self propelled back to room with AMOS with lunch with spouse. Call light in reach and spouse in room upon exit.  Activity Tolerance: Patient limited by fatigue;Patient limited by endurance;Patient limited by pain    Subjective  Subjective  Subjective: Today is my anniversary 41 years  Pain: Left hip pain     Objective  Vitals     Bed Mobility Training  Bed Mobility Training: Yes  Overall Level of Assistance: Supervision  Interventions: Verbal cues  Rolling: Supervision  Supine to Sit:

## 2025-06-27 NOTE — PLAN OF CARE
Problem: Discharge Planning  Goal: Discharge to home or other facility with appropriate resources  6/27/2025 1242 by Isadora Bran RN  Outcome: Progressing  6/27/2025 0231 by Ricky Zafar RN  Outcome: Progressing     Problem: Safety - Adult  Goal: Free from fall injury  6/27/2025 1242 by Isadora Bran RN  Outcome: Progressing  6/27/2025 0231 by Ricky Zafar RN  Outcome: Progressing     Problem: Skin/Tissue Integrity  Goal: Skin integrity remains intact  Description: 1.  Monitor for areas of redness and/or skin breakdown  2.  Assess vascular access sites hourly  3.  Every 4-6 hours minimum:  Change oxygen saturation probe site  4.  Every 4-6 hours:  If on nasal continuous positive airway pressure, respiratory therapy assess nares and determine need for appliance change or resting period  6/27/2025 1242 by Isadora Bran RN  Outcome: Progressing  6/27/2025 0231 by Ricky Zafar RN  Outcome: Progressing     Problem: ABCDS Injury Assessment  Goal: Absence of physical injury  6/27/2025 1242 by Isadora Bran RN  Outcome: Progressing  6/27/2025 0231 by Ricky Zafar RN  Outcome: Progressing     Problem: Pain  Goal: Verbalizes/displays adequate comfort level or baseline comfort level  Outcome: Progressing

## 2025-06-27 NOTE — CONSULTS
Comprehensive Nutrition Assessment    Type and Reason for Visit:  Reassess, Wound, Consult (Readmitted as inpt)    Nutrition Recommendations/Plan:   Continue current diet as medically appropriate and tolerated.  Continue to encourage PO intakes as appropriate. Avg of 57-81% x 9 meals with some meals being brought in for patient.   Continue Cornelius and Ensure Plus BID.    RD to follow patient and available PRN.      Malnutrition Assessment:  Malnutrition Status:  No malnutrition (06/27/25 1449)    Context:        Findings of the 6 clinical characteristics of malnutrition:  Energy Intake:     Weight Loss:        Body Fat Loss:        Muscle Mass Loss:       Fluid Accumulation:        Strength:       Nutrition Assessment:    Patient recently readmitted as inpatient. Patient is at moderate risk for ongoing nutritional compromise r/t wounds and fair-good intakes.    Nutrition Related Findings:    Weight loss of 4 lb in 8 days, different scales used (bedscale vs. bedside scale). PMH basal cell carcinoma, HTN, CAD, GERD, HLD, CKD, vitamin D deficiency. Medications: retacrit, vitamin D, folic acid, lasix, B12, ferrous sulfate, lactobacillus. Labs: K+ 3.1, BUN 32, Cr 1.8, Cr 1.8, GFR 38, CRP 32.9, alb 3.1, alk phos 111. Wound Type: Multiple, Pressure Injury, Surgical Incision (DTI right heel, sx incision left hip)       Current Nutrition Intake & Therapies:    Average Meal Intake: 51-75%, % (57-81% x 9 meals)  Average Supplements Intake: 51-75%  ADULT DIET; Regular  ADULT ORAL NUTRITION SUPPLEMENT; Lunch, Dinner; Wound Healing Oral Supplement  ADULT ORAL NUTRITION SUPPLEMENT; Breakfast, Lunch; Standard High Calorie/High Protein Oral Supplement    Anthropometric Measures:  Height: 182.9 cm (6')  Ideal Body Weight (IBW): 178 lbs (81 kg)       Current Body Weight: 112.9 kg (249 lb), 139.9 % IBW. Weight Source: Bed scale  Current BMI (kg/m2): 33.8           Weight Adjustment For: No Adjustment

## 2025-06-27 NOTE — PROGRESS NOTES
Physical Therapy  Facility/Department: [facility] MED SURG  Daily Treatment Note  NAME: Jose Palma  : 1945  MRN: [id number]    Date of Service: 2025    Discharge Recommendations:  Continue to assess pending progress, Home with assist PRN, Therapy recommended at discharge      Patient Diagnosis(es): There were no encounter diagnoses.    Assessment  Assessment: Patient awake in bed and agreeable to cotreatment with OT. Patient had pain meds prior to therapists' arrival but still c/o L hip pain. Patient transitioned supine to sit with supervision and sat EOB for sponge bathing and clothing change. Stood with Mod A of 2 and cues for upright posture. Patient continues to have difficulty with sit to stand and straightening up to the RW. Patient took a step to transfer to the w/c but could not complete the transfer and required Mod/Max A of 2 to get turned and into the w/c. Patient ambulated 15, 13, 9, and 10 feet with RW, Min A of 2, and w/c follow.  Activity Tolerance: Patient limited by fatigue;Patient limited by endurance;Patient limited by pain    Plan  Physical Therapy Plan  General Plan: 3-5 times per week  Days Per Week: 5 Days  Therapy Duration: 10 Days  Current Treatment Recommendations: Strengthening;ROM;Balance training;Functional mobility training;Transfer training;Endurance training;Pain management;Stair training;Gait training;Home exercise program;Safety education & training;Patient/Caregiver education & training;Equipment evaluation, education, & procurement;Therapeutic activities;Wheelchair mobility training    Restrictions  Restrictions/Precautions  Restrictions/Precautions: General Precautions, Fall Risk  Required Braces or Orthoses?: No     Subjective   Subjective  Subjective: Patient presents awake in bed, NAD    Objective  Bed Mobility Training  Bed Mobility Training: Yes  Overall Level of Assistance: Supervision  Interventions: Verbal cues  Rolling: Supervision  Supine to Sit:

## 2025-06-27 NOTE — H&P
History and Physical    Patient:  Jose Palma  :  1945    CHIEF COMPLAINT:    MRSA    HISTORY OF PRESENT ILLNESS:   The patient is a 79 y.o. male with a h/o hypertension, anemia, CAD, PAD, AAA, vitamin D deficiency, OA, anxiety, GERD, hyperlipidemia with new onset wound infection secondary to MRSA.  Patient is s/p  left hip fracture after a fall requiring surgical fixation on 2025.  Patient was originally transferred here for acute rehab.  While here patient had not been progressing as he should.  His anemia, likely due to surgery was trending up but very slowly.  His surgical site began to look infected.  Wound culture was obtained which did grow Staph aureus MRSA.  Is sensitive to vancomycin.  Patient was put on IV vancomycin and then transition back to acute care bed for active infection of recent surgical site.  [facility] but [facility] do not have any beds.  Patient states since starting on vancomycin last night his pain is actually improved.    Past Medical History:      Diagnosis Date    Cancer (HCC)     basal cell carcinoma from left side of nose    Heart attack (HCC)     Hypertension        Past Surgical History:      Procedure Laterality Date    HYDROCELE EXCISION      KNEE SURGERY Left 2018    NECK SURGERY  10/28/2009    SKIN CANCER EXCISION         Medications Prior to Admission:    Prior to Admission medications    Medication Sig Start Date End Date Taking? Authorizing Provider   acetaminophen (TYLENOL) 325 MG tablet Take 3 tablets by mouth every 6 hours as needed for Pain    ProviderMame MD   albuterol (2.5 MG/3ML) 0.083% NEBU 3 mL, ipratropium 0.5 mg-albuterol 2.5 mg 0.5-2.5 (3) MG/3ML SOLN 3 mL Take 1 Dose by nebulization every 4 hours as needed for Wheezing    ProviderMame MD   carboxymethylcellulose 1 % ophthalmic solution Place 1 drop into both eyes 4 times daily    Mame Lyons

## 2025-06-28 PROBLEM — I95.9 HYPOTENSION: Status: ACTIVE | Noted: 2025-06-28

## 2025-06-28 LAB — VANCOMYCIN TROUGH SERPL-MCNC: 19.3 UG/ML (ref 5–15)

## 2025-06-28 PROCEDURE — 6370000000 HC RX 637 (ALT 250 FOR IP): Performed by: PHYSICIAN ASSISTANT

## 2025-06-28 PROCEDURE — 99232 SBSQ HOSP IP/OBS MODERATE 35: CPT | Performed by: INTERNAL MEDICINE

## 2025-06-28 PROCEDURE — 2580000003 HC RX 258: Performed by: NURSE PRACTITIONER

## 2025-06-28 PROCEDURE — 1200000000 HC SEMI PRIVATE

## 2025-06-28 PROCEDURE — 2580000003 HC RX 258: Performed by: PHYSICIAN ASSISTANT

## 2025-06-28 PROCEDURE — 94761 N-INVAS EAR/PLS OXIMETRY MLT: CPT

## 2025-06-28 PROCEDURE — 80202 ASSAY OF VANCOMYCIN: CPT

## 2025-06-28 PROCEDURE — 6360000002 HC RX W HCPCS: Performed by: NURSE PRACTITIONER

## 2025-06-28 PROCEDURE — 6370000000 HC RX 637 (ALT 250 FOR IP): Performed by: NURSE PRACTITIONER

## 2025-06-28 PROCEDURE — 36415 COLL VENOUS BLD VENIPUNCTURE: CPT

## 2025-06-28 RX ORDER — SODIUM CHLORIDE 9 MG/ML
INJECTION, SOLUTION INTRAVENOUS CONTINUOUS
Status: ACTIVE | OUTPATIENT
Start: 2025-06-28 | End: 2025-06-28

## 2025-06-28 RX ORDER — CARVEDILOL 3.12 MG/1
3.12 TABLET ORAL 2 TIMES DAILY
Status: DISCONTINUED | OUTPATIENT
Start: 2025-06-28 | End: 2025-06-30 | Stop reason: HOSPADM

## 2025-06-28 RX ADMIN — SENNOSIDES 17.2 MG: 8.6 TABLET, FILM COATED ORAL at 20:39

## 2025-06-28 RX ADMIN — SIMETHICONE 80 MG: 80 TABLET, CHEWABLE ORAL at 09:27

## 2025-06-28 RX ADMIN — GABAPENTIN 300 MG: 300 CAPSULE ORAL at 20:26

## 2025-06-28 RX ADMIN — FERROUS SULFATE TAB EC 324 MG (65 MG FE EQUIVALENT) 324 MG: 324 (65 FE) TABLET DELAYED RESPONSE at 09:07

## 2025-06-28 RX ADMIN — VANCOMYCIN HYDROCHLORIDE 1000 MG: 1 INJECTION, POWDER, LYOPHILIZED, FOR SOLUTION INTRAVENOUS at 20:46

## 2025-06-28 RX ADMIN — ERGOCALCIFEROL 50000 UNITS: 1.25 CAPSULE ORAL at 09:07

## 2025-06-28 RX ADMIN — CARVEDILOL 3.12 MG: 3.12 TABLET, FILM COATED ORAL at 20:27

## 2025-06-28 RX ADMIN — ENOXAPARIN SODIUM 30 MG: 100 INJECTION SUBCUTANEOUS at 20:48

## 2025-06-28 RX ADMIN — FOLIC ACID 1 MG: 1 TABLET ORAL at 09:07

## 2025-06-28 RX ADMIN — CYANOCOBALAMIN TAB 500 MCG 1000 MCG: 500 TAB at 09:07

## 2025-06-28 RX ADMIN — CILOSTAZOL 50 MG: 100 TABLET ORAL at 09:07

## 2025-06-28 RX ADMIN — OXYCODONE 5 MG: 5 TABLET ORAL at 20:39

## 2025-06-28 RX ADMIN — CARBOXYMETHYLCELLULOSE SODIUM 1 DROP: 5 SOLUTION/ DROPS OPHTHALMIC at 20:26

## 2025-06-28 RX ADMIN — DICLOFENAC SODIUM 2 G: 10 GEL TOPICAL at 09:09

## 2025-06-28 RX ADMIN — FUROSEMIDE 40 MG: 40 TABLET ORAL at 09:07

## 2025-06-28 RX ADMIN — GABAPENTIN 300 MG: 300 CAPSULE ORAL at 09:07

## 2025-06-28 RX ADMIN — CILOSTAZOL 50 MG: 100 TABLET ORAL at 20:26

## 2025-06-28 RX ADMIN — Medication: at 12:53

## 2025-06-28 RX ADMIN — METHOCARBAMOL 1000 MG: 500 TABLET ORAL at 09:07

## 2025-06-28 RX ADMIN — CARVEDILOL 12.5 MG: 12.5 TABLET, FILM COATED ORAL at 09:07

## 2025-06-28 RX ADMIN — ROSUVASTATIN 40 MG: 20 TABLET, FILM COATED ORAL at 20:26

## 2025-06-28 RX ADMIN — Medication 1 CAPSULE: at 09:07

## 2025-06-28 RX ADMIN — ONDANSETRON 4 MG: 4 TABLET, ORALLY DISINTEGRATING ORAL at 09:27

## 2025-06-28 RX ADMIN — ASPIRIN 81 MG: 81 TABLET, CHEWABLE ORAL at 09:07

## 2025-06-28 RX ADMIN — PANTOPRAZOLE SODIUM 40 MG: 40 TABLET, DELAYED RELEASE ORAL at 06:12

## 2025-06-28 RX ADMIN — ENOXAPARIN SODIUM 30 MG: 100 INJECTION SUBCUTANEOUS at 09:08

## 2025-06-28 RX ADMIN — SODIUM CHLORIDE: 0.9 INJECTION, SOLUTION INTRAVENOUS at 12:51

## 2025-06-28 RX ADMIN — GUAIFENESIN 600 MG: 600 TABLET, EXTENDED RELEASE ORAL at 09:07

## 2025-06-28 RX ADMIN — METHOCARBAMOL 1000 MG: 500 TABLET ORAL at 20:25

## 2025-06-28 RX ADMIN — Medication 10 MG: at 20:27

## 2025-06-28 RX ADMIN — CARBOXYMETHYLCELLULOSE SODIUM 1 DROP: 5 SOLUTION/ DROPS OPHTHALMIC at 09:12

## 2025-06-28 RX ADMIN — Medication: at 21:16

## 2025-06-28 ASSESSMENT — PAIN SCALES - GENERAL
PAINLEVEL_OUTOF10: 5
PAINLEVEL_OUTOF10: 5

## 2025-06-28 ASSESSMENT — PAIN DESCRIPTION - DESCRIPTORS
DESCRIPTORS: ACHING;DISCOMFORT
DESCRIPTORS: ACHING;NAGGING

## 2025-06-28 ASSESSMENT — PAIN DESCRIPTION - LOCATION
LOCATION: HEAD;LEG
LOCATION: LEG

## 2025-06-28 ASSESSMENT — PAIN DESCRIPTION - ORIENTATION
ORIENTATION: LEFT
ORIENTATION: LEFT

## 2025-06-28 NOTE — PLAN OF CARE
Problem: Discharge Planning  Goal: Discharge to home or other facility with appropriate resources  6/28/2025 0034 by Lonny Suero RN  Outcome: Progressing  6/27/2025 1242 by Isadora Bran RN  Outcome: Progressing     Problem: Safety - Adult  Goal: Free from fall injury  6/28/2025 0034 by Lonny Suero RN  Outcome: Progressing  6/27/2025 1242 by Isadora Bran RN  Outcome: Progressing     Problem: Skin/Tissue Integrity  Goal: Skin integrity remains intact  Description: 1.  Monitor for areas of redness and/or skin breakdown  2.  Assess vascular access sites hourly  3.  Every 4-6 hours minimum:  Change oxygen saturation probe site  4.  Every 4-6 hours:  If on nasal continuous positive airway pressure, respiratory therapy assess nares and determine need for appliance change or resting period  6/28/2025 0034 by Lonny Suero RN  Outcome: Progressing  6/27/2025 1242 by Isadora Bran RN  Outcome: Progressing     Problem: ABCDS Injury Assessment  Goal: Absence of physical injury  6/27/2025 1242 by Isadora Bran RN  Outcome: Progressing     Problem: Pain  Goal: Verbalizes/displays adequate comfort level or baseline comfort level  6/27/2025 1242 by Isadora Bran RN  Outcome: Progressing     Problem: Nutrition Deficit:  Goal: Optimize nutritional status  Recent Flowsheet Documentation  Taken 6/27/2025 1445 by Delmy Henry RD  Nutrient intake appropriate for improving, restoring, or maintaining nutritional needs: Monitor oral intake, labs, and treatment plans

## 2025-06-28 NOTE — PROGRESS NOTES
Offered to help set up the patient to shave. Patient stated \" I will shave tomorrow.\" Assisted the patient to the bathroom via kamlesh stedy. Patient noted to be very generalized weak and require staff X 2 to pull the patient up from setting position to kamlesh stedy and assist X 2 to assist to commode.

## 2025-06-28 NOTE — PROGRESS NOTES
CAD  - Continue current meds  - Follow-up with cardiology outpatient     HTN  - Hypotensive, Suspect secondary to overmedication and acute illness.  Monitor for need for further workup  - Decreased home Coreg from 12.5 mg twice daily to 3.125 mg twice daily, hold home Lasix  - Give 1 L IVF's  - Monitor renal function  - Make adjustments as      Electronically signed by Vee Robles, DO on 6/28/2025 at 5:54 PM

## 2025-06-28 NOTE — PROGRESS NOTES
Patient awake and agreed to get cleaned up and have linen change. Went to administer patients Vanc and IVF's after patient completed partial bath. Patient stated \" can we just wait on that I want to go take this wheel chair In the [address].

## 2025-06-28 NOTE — PLAN OF CARE
Problem: Discharge Planning  Goal: Discharge to home or other facility with appropriate resources  6/28/2025 1336 by Purnima Box, RN  Outcome: Progressing  6/28/2025 1336 by Purnima Box RN  Outcome: Progressing  6/28/2025 0034 by Lonny Suero, RN  Outcome: Progressing

## 2025-06-28 NOTE — PROGRESS NOTES
[facility]   Pharmacy Pharmacokinetic Monitoring Service - Vancomycin    Consulting Provider: Lorelei WOMACK   Indication: MRSA SSTI  Target Concentration: Goal AUC/SHERI 400-600 mg*hr/L  Day of Therapy: 4  Additional Antimicrobials: N/A    Pertinent Laboratory Values:   Wt Readings from Last 1 Encounters:   06/26/25 113.1 kg (249 lb 7 oz)     Temp Readings from Last 1 Encounters:   06/28/25 98.2 °F (36.8 °C)     Estimated Creatinine Clearance: 45 mL/min (A) (based on SCr of 1.8 mg/dL (H)).  Recent Labs     06/26/25  0839   CREATININE 1.8*   BUN 32*   WBC 6.0     Procalcitonin: 0.13 (06/26)    Pertinent Cultures:  Culture Date Source Results   06/24/25 Wound MRSA   MRSA Nasal Swab: N/A. Non-respiratory infection.    Recent vancomycin administrations                     vancomycin (VANCOCIN) 1,250 mg in sodium chloride 0.9 % 250 mL IVPB (mg) 1,250 mg New Bag 06/27/25 1821     1,250 mg New Bag 06/26/25 1830    vancomycin (VANCOCIN) 1,250 mg in sodium chloride 0.9 % 250 mL IVPB (mg) 1,250 mg New Bag 06/25/25 1738                    Assessment:  Date/Time Current Dose Concentration Timing of Concentration (h) AUC   06/28/25 1250 mg IV Q 24 hours 18.9 21 H 59 M 630   Note: Serum concentrations collected for AUC dosing may appear elevated if collected in close proximity to the dose administered, this is not necessarily an indication of toxicity    Plan:  Current dosing regimen is supra-therapeutic  \"Decrease dose to 1000 mg IV Q 24 hours  Repeat vancomycin concentration ordered for 06/30/25 @ 1800   Pharmacy will continue to monitor patient and adjust therapy as indicated    Thank you for the consult,  Chidi Oseguera [facility]  6/28/2025 5:09 PM

## 2025-06-28 NOTE — PLAN OF CARE
Problem: Discharge Planning  Goal: Discharge to home or other facility with appropriate resources  6/28/2025 1336 by Purnima Box, RN  Outcome: Progressing  6/28/2025 0034 by Lonny Suero, RN  Outcome: Progressing

## 2025-06-29 PROBLEM — E87.6 HYPOKALEMIA: Status: ACTIVE | Noted: 2025-06-29

## 2025-06-29 LAB
ALBUMIN SERPL-MCNC: 3 G/DL (ref 3.4–4.8)
ALBUMIN/GLOB SERPL: 0.8 {RATIO} (ref 0.8–2)
ALP SERPL-CCNC: 105 U/L (ref 25–100)
ALT SERPL-CCNC: 6 U/L (ref 4–36)
ANION GAP SERPL CALCULATED.3IONS-SCNC: 11 MMOL/L (ref 3–16)
AST SERPL-CCNC: 13 U/L (ref 8–33)
BACTERIA SPEC AEROBE CULT: ABNORMAL
BACTERIA SPEC ANAEROBE CULT: ABNORMAL
BILIRUB SERPL-MCNC: 0.4 MG/DL (ref 0.3–1.2)
BUN SERPL-MCNC: 32 MG/DL (ref 6–20)
CALCIUM SERPL-MCNC: 8.8 MG/DL (ref 8.3–10.6)
CHLORIDE SERPL-SCNC: 100 MMOL/L (ref 98–107)
CO2 SERPL-SCNC: 27 MMOL/L (ref 20–30)
CREAT SERPL-MCNC: 1.8 MG/DL (ref 0.8–1.3)
CRP SERPL-MCNC: 39.9 MG/L (ref 0–5.1)
ERYTHROCYTE [DISTWIDTH] IN BLOOD BY AUTOMATED COUNT: 14.6 % (ref 11–16)
ERYTHROCYTE [SEDIMENTATION RATE] IN BLOOD BY WESTERGREN METHOD: 60 MM/HR (ref 0–20)
GFR SERPLBLD CREATININE-BSD FMLA CKD-EPI: 38 ML/MIN/{1.73_M2}
GLOBULIN SER CALC-MCNC: 3.7 G/DL
GLUCOSE SERPL-MCNC: 92 MG/DL (ref 74–106)
GRAM STN SPEC: ABNORMAL
HCT VFR BLD AUTO: 30.5 % (ref 40–54)
HGB BLD-MCNC: 9.8 G/DL (ref 13–18)
MAGNESIUM SERPL-MCNC: 1.8 MG/DL (ref 1.7–2.4)
MCH RBC QN AUTO: 30.3 PG (ref 27–32)
MCHC RBC AUTO-ENTMCNC: 32.1 G/DL (ref 31–35)
MCV RBC AUTO: 94.4 FL (ref 80–100)
ORGANISM: ABNORMAL
PLATELET # BLD AUTO: 206 K/UL (ref 150–400)
PMV BLD AUTO: 9.3 FL (ref 6–10)
POTASSIUM SERPL-SCNC: 3 MMOL/L (ref 3.4–5.1)
PROCALCITONIN SERPL IA-MCNC: 0.13 NG/ML (ref 0–0.15)
PROT SERPL-MCNC: 6.7 G/DL (ref 6.4–8.3)
RBC # BLD AUTO: 3.23 M/UL (ref 4.5–6)
SODIUM SERPL-SCNC: 138 MMOL/L (ref 136–145)
WBC # BLD AUTO: 9.5 K/UL (ref 4–11)

## 2025-06-29 PROCEDURE — 2580000003 HC RX 258: Performed by: NURSE PRACTITIONER

## 2025-06-29 PROCEDURE — 83735 ASSAY OF MAGNESIUM: CPT

## 2025-06-29 PROCEDURE — 6370000000 HC RX 637 (ALT 250 FOR IP): Performed by: NURSE PRACTITIONER

## 2025-06-29 PROCEDURE — 80053 COMPREHEN METABOLIC PANEL: CPT

## 2025-06-29 PROCEDURE — 6370000000 HC RX 637 (ALT 250 FOR IP): Performed by: PHYSICIAN ASSISTANT

## 2025-06-29 PROCEDURE — 85027 COMPLETE CBC AUTOMATED: CPT

## 2025-06-29 PROCEDURE — 1200000000 HC SEMI PRIVATE

## 2025-06-29 PROCEDURE — 99232 SBSQ HOSP IP/OBS MODERATE 35: CPT | Performed by: PHYSICIAN ASSISTANT

## 2025-06-29 PROCEDURE — 85652 RBC SED RATE AUTOMATED: CPT

## 2025-06-29 PROCEDURE — 84145 PROCALCITONIN (PCT): CPT

## 2025-06-29 PROCEDURE — 6360000002 HC RX W HCPCS: Performed by: NURSE PRACTITIONER

## 2025-06-29 PROCEDURE — 36415 COLL VENOUS BLD VENIPUNCTURE: CPT

## 2025-06-29 PROCEDURE — 86140 C-REACTIVE PROTEIN: CPT

## 2025-06-29 PROCEDURE — 94761 N-INVAS EAR/PLS OXIMETRY MLT: CPT

## 2025-06-29 RX ORDER — POTASSIUM CHLORIDE 750 MG/1
40 CAPSULE, EXTENDED RELEASE ORAL EVERY 4 HOURS
Status: COMPLETED | OUTPATIENT
Start: 2025-06-29 | End: 2025-06-29

## 2025-06-29 RX ADMIN — FOLIC ACID 1 MG: 1 TABLET ORAL at 08:12

## 2025-06-29 RX ADMIN — CYANOCOBALAMIN TAB 500 MCG 1000 MCG: 500 TAB at 08:13

## 2025-06-29 RX ADMIN — Medication 1 CAPSULE: at 08:13

## 2025-06-29 RX ADMIN — ENOXAPARIN SODIUM 30 MG: 100 INJECTION SUBCUTANEOUS at 20:29

## 2025-06-29 RX ADMIN — ONDANSETRON 4 MG: 4 TABLET, ORALLY DISINTEGRATING ORAL at 18:22

## 2025-06-29 RX ADMIN — PANTOPRAZOLE SODIUM 40 MG: 40 TABLET, DELAYED RELEASE ORAL at 05:45

## 2025-06-29 RX ADMIN — VANCOMYCIN HYDROCHLORIDE 1000 MG: 1 INJECTION, POWDER, LYOPHILIZED, FOR SOLUTION INTRAVENOUS at 20:24

## 2025-06-29 RX ADMIN — ROSUVASTATIN 40 MG: 20 TABLET, FILM COATED ORAL at 20:29

## 2025-06-29 RX ADMIN — CILOSTAZOL 50 MG: 100 TABLET ORAL at 08:12

## 2025-06-29 RX ADMIN — POTASSIUM CHLORIDE 40 MEQ: 750 CAPSULE, EXTENDED RELEASE ORAL at 08:13

## 2025-06-29 RX ADMIN — METHOCARBAMOL 1000 MG: 500 TABLET ORAL at 20:27

## 2025-06-29 RX ADMIN — CILOSTAZOL 50 MG: 100 TABLET ORAL at 20:28

## 2025-06-29 RX ADMIN — GABAPENTIN 300 MG: 300 CAPSULE ORAL at 08:13

## 2025-06-29 RX ADMIN — SENNOSIDES 17.2 MG: 8.6 TABLET, FILM COATED ORAL at 08:22

## 2025-06-29 RX ADMIN — DICLOFENAC SODIUM 2 G: 10 GEL TOPICAL at 20:32

## 2025-06-29 RX ADMIN — ENOXAPARIN SODIUM 30 MG: 100 INJECTION SUBCUTANEOUS at 08:11

## 2025-06-29 RX ADMIN — CARVEDILOL 3.12 MG: 3.12 TABLET, FILM COATED ORAL at 08:12

## 2025-06-29 RX ADMIN — METHOCARBAMOL 1000 MG: 500 TABLET ORAL at 14:23

## 2025-06-29 RX ADMIN — GABAPENTIN 300 MG: 300 CAPSULE ORAL at 20:27

## 2025-06-29 RX ADMIN — DICLOFENAC SODIUM 2 G: 10 GEL TOPICAL at 08:17

## 2025-06-29 RX ADMIN — FERROUS SULFATE TAB EC 324 MG (65 MG FE EQUIVALENT) 324 MG: 324 (65 FE) TABLET DELAYED RESPONSE at 08:12

## 2025-06-29 RX ADMIN — GABAPENTIN 300 MG: 300 CAPSULE ORAL at 14:23

## 2025-06-29 RX ADMIN — METHOCARBAMOL 1000 MG: 500 TABLET ORAL at 08:12

## 2025-06-29 RX ADMIN — Medication: at 15:00

## 2025-06-29 RX ADMIN — POTASSIUM CHLORIDE 40 MEQ: 750 CAPSULE, EXTENDED RELEASE ORAL at 11:40

## 2025-06-29 RX ADMIN — CARVEDILOL 3.12 MG: 3.12 TABLET, FILM COATED ORAL at 20:28

## 2025-06-29 RX ADMIN — ASPIRIN 81 MG: 81 TABLET, CHEWABLE ORAL at 08:12

## 2025-06-29 ASSESSMENT — PAIN SCALES - GENERAL: PAINLEVEL_OUTOF10: 4

## 2025-06-29 ASSESSMENT — PAIN DESCRIPTION - ORIENTATION
ORIENTATION: LEFT
ORIENTATION: RIGHT

## 2025-06-29 ASSESSMENT — PAIN DESCRIPTION - LOCATION
LOCATION: OTHER (COMMENT)
LOCATION: LEG;HIP

## 2025-06-29 NOTE — PLAN OF CARE
Problem: Discharge Planning  Goal: Discharge to home or other facility with appropriate resources  6/29/2025 0023 by Lonny Suero RN  Outcome: Progressing  6/28/2025 1336 by Purnima Box RN  Outcome: Progressing  6/28/2025 1336 by Purnima Box RN  Outcome: Progressing     Problem: Safety - Adult  Goal: Free from fall injury  6/28/2025 1336 by Purnima Box RN  Outcome: Progressing  6/28/2025 1336 by Purnima Box RN  Outcome: Progressing     Problem: Skin/Tissue Integrity  Goal: Skin integrity remains intact  Description: 1.  Monitor for areas of redness and/or skin breakdown  2.  Assess vascular access sites hourly  3.  Every 4-6 hours minimum:  Change oxygen saturation probe site  4.  Every 4-6 hours:  If on nasal continuous positive airway pressure, respiratory therapy assess nares and determine need for appliance change or resting period  6/29/2025 0023 by Lonny Suero RN  Outcome: Progressing  6/28/2025 1336 by Purnima Box RN  Outcome: Progressing  6/28/2025 1336 by Purnima Box RN  Outcome: Progressing     Problem: ABCDS Injury Assessment  Goal: Absence of physical injury  6/29/2025 0023 by Lonny Suero RN  Outcome: Progressing  6/28/2025 1336 by Purnima Box RN  Outcome: Progressing  6/28/2025 1336 by Purnima Box RN  Outcome: Progressing     Problem: Pain  Goal: Verbalizes/displays adequate comfort level or baseline comfort level  6/28/2025 1336 by Purnima Box RN  Outcome: Progressing  6/28/2025 1336 by Purnima Box RN  Outcome: Progressing     Problem: Nutrition Deficit:  Goal: Optimize nutritional status  6/28/2025 1336 by Purnima Box RN  Outcome: Progressing  6/28/2025 1336 by Purnima Box RN  Outcome: Progressing

## 2025-06-29 NOTE — PROGRESS NOTES
Patient assisted back to bed with gait belt x 2 assist. Patient toleraetd well. Call light within reach.

## 2025-06-29 NOTE — FLOWSHEET NOTE
06/26/25 2130   Assessment   Charting Type Shift assessment   Psychosocial   Psychosocial (WDL) WDL   Neurological   Neuro (WDL) WDL   Kim Coma Scale   Eye Opening 4   Best Verbal Response 5   Best Motor Response 6   Tolleson Coma Scale Score 15   HEENT (Head, Ears, Eyes, Nose, & Throat)   HEENT (WDL) X   Right Eye Glasses   Left Eye Glasses   Teeth Dentures upper;Dentures lower   Respiratory   Respiratory (WDL) WDL   Cardiac   Cardiac (WDL) WDL   Gastrointestinal   Abdominal (WDL) X   Abdomen Inspection Rounded   RUQ Bowel Sounds Active   LUQ Bowel Sounds Active   RLQ Bowel Sounds Active   LLQ Bowel Sounds Active   Tenderness No guarding   Genitourinary   Genitourinary (WDL) WDL   Urine Assessment   Urine Color Yellow/straw   Urine Appearance Clear   Urine Odor   (strong NH3 odor)   Peripheral Vascular   Peripheral Vascular (WDL) X   RLE Edema Trace   LLE Edema Trace   Anti-Embolism   Anti-Embolism Intervention Medication   Skin Integumentary    Skin Integumentary (WDL) X   Musculoskeletal   RL Extremity Weakness;Unsteady   LL Extremity Weakness;Unsteady   Musculoskeletal (WDL) X   Musculoskeletal Details   L Hip Surgery   Wound 06/17/25 Heel Right   Date First Assessed/Time First Assessed: 06/17/25 0800   Primary Wound Type: Pressure Injury  Location: Heel  Wound Location Orientation: Right   Wound Etiology Deep tissue/Injury   Dressing Status Clean;Dry;Intact   Dressing/Treatment Foam;Gauze dressing/dressing sponge;Roll gauze   Dressing Change Due 06/27/25   Wound 06/13/25 Hip Left;Proximal;Upper   Date First Assessed: 06/13/25   Present on Original Admission: Yes  Location: Hip  Wound Location Orientation: Left;Proximal;Upper   Wound Etiology Surgical   Dressing Status Clean;Dry;Intact   Dressing/Treatment Gauze dressing/dressing sponge   Dressing Change Due 06/27/25   Wound 06/13/25 Hip Left;Lower   Date First Assessed: 06/13/25   Present on Original Admission: Yes  Location: Hip  Wound Location 
Goals   Skin Integrity Remains Intact Monitor for areas of redness and/or skin breakdown   Musculoskeletal   RL Extremity Weakness;Unsteady   LL Extremity Weakness;Unsteady   Musculoskeletal (WDL) X   Musculoskeletal Details   L Hip Surgery   Wound 06/17/25 Heel Right   Date First Assessed/Time First Assessed: 06/17/25 0800   Primary Wound Type: Pressure Injury  Location: Heel  Wound Location Orientation: Right   Wound Etiology Deep tissue/Injury   Dressing Status New dressing applied   Wound Cleansed Irrigated with saline   Dressing/Treatment Foam;Gauze dressing/dressing sponge;Roll gauze   Dressing Change Due 06/30/25   Wound Assessment Pink/red   Drainage Amount None (dry)   Odor None   Iesha-wound Assessment Non-blanchable erythema   Wound 06/13/25 Hip Left;Proximal;Upper   Date First Assessed: 06/13/25   Present on Original Admission: Yes  Location: Hip  Wound Location Orientation: Left;Proximal;Upper   Wound Etiology Surgical   Dressing Status Clean;Dry;Intact   Dressing/Treatment Gauze dressing/dressing sponge;Honey gel/honey paste   Dressing Change Due 06/28/25   Wound 06/13/25 Hip Left;Lower   Date First Assessed: 06/13/25   Present on Original Admission: Yes  Location: Hip  Wound Location Orientation: Left;Lower   Wound Etiology Surgical   Dressing Status Other (Comment)  (TURNER)   Dressing/Treatment Open to air     Patient resting in bed. AM medications administered per order. Dressing to right foot changed. Diclofenac applied to right heel. Patient was agreeable to get up into wheelchair shortly. Call light placed within reach.   Patient and nurse reports he was disimpacted last PM. Stool softener administered.

## 2025-06-29 NOTE — PROGRESS NOTES
Progress Note      Patient:  Jose Palma  :  1945    Subjective:   Chief complaint:   Surgical site infection    Interval History:   Patient seen and examined this morning.  He reports improvement in left hip pain.  No acute events overnight.    Review of systems:   Constitutional:  Denies fever or chills.  Positive for declining functional status.  Eyes:  Denies change in visual acuity or discharge.  HENT:  Denies nasal congestion or sore throat.  Respiratory:  Denies cough or shortness of breath.   Cardiovascular:  Denies chest pain, palpitation or swelling in LEs.  GI:  Denies abdominal pain, nausea, vomiting, bloody stools or diarrhea.   :  Denies dysuria or frequency.   Musculoskeletal:  Denies back pain.  Positive for left hip pain.  Integument:  Denies rash or itching.  Positive for left hip drainage and redness.  Neurologic:  Denies headache, focal weakness or sensory changes.   Endocrine:  Denies polyuria or polydipsia.   Lymphatic:  Denies swollen glands or night sweats.  Psychiatric:  Denies depression or anxiety.    Past medical history, surgical history, family history and social history reviewed and unchanged compared to H&P earlier this admission.    Medications:   Scheduled Meds:   potassium chloride  40 mEq Oral Q4H    carvedilol  3.125 mg Oral BID    vancomycin (VANCOCIN) intermittent dosing (placeholder)   Other RX Placeholder    vancomycin  1,000 mg IntraVENous Q24H    aspirin  81 mg Oral Daily    carboxymethylcellulose PF  1 drop Both Eyes 4x Daily    cilostazol  50 mg Oral BID    diclofenac sodium  2 g Topical BID    enoxaparin  30 mg SubCUTAneous BID    [START ON 2025] epoetin bjorn-epbx  10,000 Units SubCUTAneous Weekly    ferrous sulfate  324 mg Oral Daily with breakfast    folic acid  1 mg Oral Daily    [Held by provider] furosemide  40 mg Oral Daily    gabapentin  300 mg Oral TID    lactobacillus  1 capsule Oral Daily with breakfast    nicotine  1 patch TransDERmal

## 2025-06-29 NOTE — PROGRESS NOTES
Patient assisted up to wheelchair via [employer]. Patient tolerated well. Call light within reach.

## 2025-06-29 NOTE — PLAN OF CARE
Problem: Safety - Adult  Goal: Free from fall injury  Outcome: Progressing     Problem: Skin/Tissue Integrity  Goal: Skin integrity remains intact  6/29/2025 0941 by Elba Del Toro, RN  Outcome: Progressing  Flowsheets (Taken 6/29/2025 0820)  Skin Integrity Remains Intact: Monitor for areas of redness and/or skin breakdown  6/29/2025 0023 by Lonny Suero, RN  Outcome: Progressing

## 2025-06-30 VITALS
HEIGHT: 72 IN | SYSTOLIC BLOOD PRESSURE: 104 MMHG | TEMPERATURE: 99.3 F | DIASTOLIC BLOOD PRESSURE: 65 MMHG | RESPIRATION RATE: 18 BRPM | BODY MASS INDEX: 33.83 KG/M2 | HEART RATE: 88 BPM | OXYGEN SATURATION: 90 %

## 2025-06-30 LAB
ALBUMIN SERPL-MCNC: 3.2 G/DL (ref 3.4–4.8)
ALBUMIN/GLOB SERPL: 0.8 {RATIO} (ref 0.8–2)
ALP SERPL-CCNC: 108 U/L (ref 25–100)
ALT SERPL-CCNC: 6 U/L (ref 4–36)
ANION GAP SERPL CALCULATED.3IONS-SCNC: 12 MMOL/L (ref 3–16)
AST SERPL-CCNC: 14 U/L (ref 8–33)
BILIRUB SERPL-MCNC: 0.4 MG/DL (ref 0.3–1.2)
BUN SERPL-MCNC: 27 MG/DL (ref 6–20)
CALCIUM SERPL-MCNC: 9.3 MG/DL (ref 8.3–10.6)
CHLORIDE SERPL-SCNC: 102 MMOL/L (ref 98–107)
CO2 SERPL-SCNC: 26 MMOL/L (ref 20–30)
CREAT SERPL-MCNC: 1.7 MG/DL (ref 0.8–1.3)
ERYTHROCYTE [DISTWIDTH] IN BLOOD BY AUTOMATED COUNT: 14.6 % (ref 11–16)
GFR SERPLBLD CREATININE-BSD FMLA CKD-EPI: 41 ML/MIN/{1.73_M2}
GLOBULIN SER CALC-MCNC: 3.8 G/DL
GLUCOSE SERPL-MCNC: 92 MG/DL (ref 74–106)
HCT VFR BLD AUTO: 33.2 % (ref 40–54)
HGB BLD-MCNC: 10.6 G/DL (ref 13–18)
MAGNESIUM SERPL-MCNC: 2 MG/DL (ref 1.7–2.4)
MCH RBC QN AUTO: 30.1 PG (ref 27–32)
MCHC RBC AUTO-ENTMCNC: 31.9 G/DL (ref 31–35)
MCV RBC AUTO: 94.3 FL (ref 80–100)
PLATELET # BLD AUTO: 210 K/UL (ref 150–400)
PMV BLD AUTO: 9.4 FL (ref 6–10)
POTASSIUM SERPL-SCNC: 3.3 MMOL/L (ref 3.4–5.1)
PROT SERPL-MCNC: 7 G/DL (ref 6.4–8.3)
RBC # BLD AUTO: 3.52 M/UL (ref 4.5–6)
SODIUM SERPL-SCNC: 140 MMOL/L (ref 136–145)
WBC # BLD AUTO: 10.1 K/UL (ref 4–11)

## 2025-06-30 PROCEDURE — 83735 ASSAY OF MAGNESIUM: CPT

## 2025-06-30 PROCEDURE — 99238 HOSP IP/OBS DSCHRG MGMT 30/<: CPT | Performed by: INTERNAL MEDICINE

## 2025-06-30 PROCEDURE — 6370000000 HC RX 637 (ALT 250 FOR IP): Performed by: NURSE PRACTITIONER

## 2025-06-30 PROCEDURE — 92610 EVALUATE SWALLOWING FUNCTION: CPT

## 2025-06-30 PROCEDURE — 6360000002 HC RX W HCPCS: Performed by: NURSE PRACTITIONER

## 2025-06-30 PROCEDURE — 80053 COMPREHEN METABOLIC PANEL: CPT

## 2025-06-30 PROCEDURE — 6370000000 HC RX 637 (ALT 250 FOR IP): Performed by: PHYSICIAN ASSISTANT

## 2025-06-30 PROCEDURE — 97530 THERAPEUTIC ACTIVITIES: CPT

## 2025-06-30 PROCEDURE — 85027 COMPLETE CBC AUTOMATED: CPT

## 2025-06-30 PROCEDURE — 94761 N-INVAS EAR/PLS OXIMETRY MLT: CPT

## 2025-06-30 PROCEDURE — 36415 COLL VENOUS BLD VENIPUNCTURE: CPT

## 2025-06-30 PROCEDURE — 97535 SELF CARE MNGMENT TRAINING: CPT

## 2025-06-30 RX ORDER — CARVEDILOL 3.12 MG/1
3.12 TABLET ORAL 2 TIMES DAILY
Qty: 60 TABLET | Refills: 3 | Status: SHIPPED | OUTPATIENT
Start: 2025-06-30

## 2025-06-30 RX ORDER — MECOBALAMIN 5000 MCG
10 TABLET,DISINTEGRATING ORAL NIGHTLY PRN
Qty: 30 TABLET | Refills: 0 | Status: SHIPPED | OUTPATIENT
Start: 2025-06-30

## 2025-06-30 RX ORDER — FOLIC ACID 1 MG/1
1 TABLET ORAL DAILY
Qty: 30 TABLET | Refills: 3 | Status: SHIPPED | OUTPATIENT
Start: 2025-07-01

## 2025-06-30 RX ORDER — METHOCARBAMOL 1000 MG/1
1000 TABLET, FILM COATED ORAL 3 TIMES DAILY
Qty: 30 TABLET | Refills: 0 | Status: SHIPPED | OUTPATIENT
Start: 2025-06-30 | End: 2025-07-10

## 2025-06-30 RX ORDER — FERROUS SULFATE 324(65)MG
324 TABLET, DELAYED RELEASE (ENTERIC COATED) ORAL
Qty: 30 TABLET | Refills: 0 | Status: SHIPPED | OUTPATIENT
Start: 2025-07-01

## 2025-06-30 RX ORDER — POTASSIUM CHLORIDE 750 MG/1
40 CAPSULE, EXTENDED RELEASE ORAL ONCE
Status: COMPLETED | OUTPATIENT
Start: 2025-06-30 | End: 2025-06-30

## 2025-06-30 RX ORDER — PANTOPRAZOLE SODIUM 40 MG/1
40 TABLET, DELAYED RELEASE ORAL
Qty: 30 TABLET | Refills: 3 | Status: SHIPPED | OUTPATIENT
Start: 2025-07-01

## 2025-06-30 RX ORDER — ERGOCALCIFEROL 1.25 MG/1
50000 CAPSULE ORAL WEEKLY
Qty: 5 CAPSULE | Refills: 0 | Status: SHIPPED | OUTPATIENT
Start: 2025-07-05

## 2025-06-30 RX ADMIN — FOLIC ACID 1 MG: 1 TABLET ORAL at 08:09

## 2025-06-30 RX ADMIN — GABAPENTIN 300 MG: 300 CAPSULE ORAL at 13:05

## 2025-06-30 RX ADMIN — METHOCARBAMOL 1000 MG: 500 TABLET ORAL at 08:08

## 2025-06-30 RX ADMIN — GABAPENTIN 300 MG: 300 CAPSULE ORAL at 08:09

## 2025-06-30 RX ADMIN — ASPIRIN 81 MG: 81 TABLET, CHEWABLE ORAL at 08:09

## 2025-06-30 RX ADMIN — POTASSIUM CHLORIDE 40 MEQ: 750 CAPSULE, EXTENDED RELEASE ORAL at 13:05

## 2025-06-30 RX ADMIN — CILOSTAZOL 50 MG: 100 TABLET ORAL at 08:09

## 2025-06-30 RX ADMIN — FERROUS SULFATE TAB EC 324 MG (65 MG FE EQUIVALENT) 324 MG: 324 (65 FE) TABLET DELAYED RESPONSE at 08:09

## 2025-06-30 RX ADMIN — METHOCARBAMOL 1000 MG: 500 TABLET ORAL at 13:05

## 2025-06-30 RX ADMIN — DICLOFENAC SODIUM 2 G: 10 GEL TOPICAL at 08:11

## 2025-06-30 RX ADMIN — Medication 1 CAPSULE: at 08:09

## 2025-06-30 RX ADMIN — PANTOPRAZOLE SODIUM 40 MG: 40 TABLET, DELAYED RELEASE ORAL at 06:28

## 2025-06-30 RX ADMIN — CYANOCOBALAMIN TAB 500 MCG 1000 MCG: 500 TAB at 08:09

## 2025-06-30 RX ADMIN — Medication: at 08:12

## 2025-06-30 RX ADMIN — ENOXAPARIN SODIUM 30 MG: 100 INJECTION SUBCUTANEOUS at 08:10

## 2025-06-30 RX ADMIN — CARVEDILOL 3.12 MG: 3.12 TABLET, FILM COATED ORAL at 08:09

## 2025-06-30 NOTE — PLAN OF CARE
Problem: Discharge Planning  Goal: Discharge to home or other facility with appropriate resources  Outcome: Progressing  Flowsheets (Taken 6/30/2025 0800)  Discharge to home or other facility with appropriate resources: Identify barriers to discharge with patient and caregiver     Problem: Safety - Adult  Goal: Free from fall injury  6/30/2025 0925 by Brittany Vale RN  Outcome: Progressing  6/29/2025 2157 by Soni Murray RN  Outcome: Progressing  6/29/2025 2156 by Soni Murray RN  Outcome: Progressing     Problem: Skin/Tissue Integrity  Goal: Skin integrity remains intact  Description: 1.  Monitor for areas of redness and/or skin breakdown  2.  Assess vascular access sites hourly  3.  Every 4-6 hours minimum:  Change oxygen saturation probe site  4.  Every 4-6 hours:  If on nasal continuous positive airway pressure, respiratory therapy assess nares and determine need for appliance change or resting period  Outcome: Progressing  Flowsheets (Taken 6/30/2025 0800)  Skin Integrity Remains Intact:   Monitor for areas of redness and/or skin breakdown   Assess vascular access sites hourly   Turn and reposition as indicated   Positioning devices     Problem: ABCDS Injury Assessment  Goal: Absence of physical injury  Outcome: Progressing     Problem: Pain  Goal: Verbalizes/displays adequate comfort level or baseline comfort level  Outcome: Progressing     Problem: Nutrition Deficit:  Goal: Optimize nutritional status  Outcome: Progressing

## 2025-06-30 NOTE — PROGRESS NOTES
Physical Therapy  Facility/Department: [facility] MED SURG  Daily Treatment Note  NAME: Jose Palma  : 1945  MRN: [id number]    Date of Service: 2025    Discharge Recommendations:  Continue to assess pending progress, Home with assist PRN, Therapy recommended at discharge      Patient Diagnosis(es): There were no encounter diagnoses.    Assessment  Assessment: Patient completed bed mobility tasks with supervision. Patient able to doff his shirt and complete UB bathing with setup. Required assistance to wash his back and feet. Donned socks with use of sock aid. Partial cotreatment with OT. Patient stood from bedside with Min A of 2 to complete joyce hygiene. Sat back down to don underwear and shorts with assistance to get them started over his feet and assistance to pull them up once standing. Patient transferred to the w/ with RW and Mod A of 2 with difficulty stepping and shifting his weight. Left in w/c with lunch tray set up.  Activity Tolerance: Patient tolerated treatment well    Plan  Physical Therapy Plan  General Plan: 3-5 times per week  Days Per Week: 5 Days  Therapy Duration: 10 Days  Current Treatment Recommendations: Strengthening;ROM;Balance training;Functional mobility training;Transfer training;Endurance training;Pain management;Stair training;Gait training;Home exercise program;Safety education & training;Patient/Caregiver education & training;Equipment evaluation, education, & procurement;Therapeutic activities;Wheelchair mobility training    Restrictions  Restrictions/Precautions  Restrictions/Precautions: General Precautions, Fall Risk  Required Braces or Orthoses?: No     Subjective   Subjective  Subjective: Patient presents awake in bed, NAD, wife present    Objective   Bed Mobility Training  Bed Mobility Training: Yes  Overall Level of Assistance: Supervision  Rolling: Supervision  Supine to Sit: Supervision  Scooting: Supervision  Balance  Sitting: Intact  Standing: With

## 2025-06-30 NOTE — PLAN OF CARE
Problem: Safety - Adult  Goal: Free from fall injury  6/29/2025 2157 by Soni Murray, RN  Outcome: Progressing  6/29/2025 2156 by Soni Murray RN  Outcome: Progressing  6/29/2025 0941 by Elba Del Toro RN  Outcome: Progressing     Problem: Skin/Tissue Integrity  Goal: Skin integrity remains intact  Description: 1.  Monitor for areas of redness and/or skin breakdown  2.  Assess vascular access sites hourly  3.  Every 4-6 hours minimum:  Change oxygen saturation probe site  4.  Every 4-6 hours:  If on nasal continuous positive airway pressure, respiratory therapy assess nares and determine need for appliance change or resting period  6/29/2025 0941 by Elba Del Toro RN  Outcome: Progressing  Flowsheets (Taken 6/29/2025 0820)  Skin Integrity Remains Intact: Monitor for areas of redness and/or skin breakdown

## 2025-06-30 NOTE — PROGRESS NOTES
Occupational Therapy  Facility/Department: [facility] MED SURG  Daily Treatment Note  NAME: Jose Palma  : 1945  MRN: [id number]    Date of Service: 2025    Discharge Recommendations:  Continue to assess pending progress     Patient Diagnosis(es): There were no encounter diagnoses.     Assessment   Assessment: Pt received seated at EOB with PTA. Pt participated in LB bathing and dressing training. Pt come to stand from EOB from elevated surface with min x2 assist. Pt stood and doffed pants with min ssist. Pt washed joyce area with min assist with forward flexed posture requiring cues to correct. Pt has difficulty standing erect to correct forward flexed posture. Pt transferred to sitting with min assist. Pt sat at EOB and donned underwear and shorts with MOD A with increased time and assist to thread around BLE. Pt transferred to stand from EOB with min x2 and completed LB dressing with moderate assist. Pt pivoted from EOB to chair with min x2 assist with mod verbal cuing for safety to complete pivot. Pt AMOS with lunch tray call light in reach upon exit.  Activity Tolerance: Patient tolerated treatment well    Subjective  Subjective  Subjective: I am ready to walk later     Objective  Vitals     Bed Mobility Training  Bed Mobility Training: Yes  Overall Level of Assistance: Supervision  Rolling: Supervision  Supine to Sit: Supervision  Scooting: Supervision  Balance  Sitting: Intact  Standing: With support;Impaired  Transfer Training  Transfer Training: Yes  Overall Level of Assistance: Minimal assistance;Partial/Moderate assistance;2 Person assistance  Sit to Stand: Minimal assistance;Partial/Moderate assistance;2 Person assistance  Stand to Sit: Minimal assistance;2 Person assistance  Stand Pivot Transfers: Partial/Moderate assistance;2 Person assistance  Bed to Chair: Partial/Moderate assistance;2 Person assistance  Gait  Gait Training: No     ADL  UE Bathing: Setup  LE Bathing: Minimal assistance  LE

## 2025-06-30 NOTE — DISCHARGE SUMMARY
Pain      albuterol (2.5 MG/3ML) 0.083% NEBU 3 mL, ipratropium 0.5 mg-albuterol 2.5 mg 0.5-2.5 (3) MG/3ML SOLN 3 mL Take 1 Dose by nebulization every 4 hours as needed for Wheezing      carboxymethylcellulose 1 % ophthalmic solution Place 1 drop into both eyes 4 times daily      gabapentin (NEURONTIN) 300 MG capsule Take 1 capsule by mouth in the morning and at bedtime. Max Daily Amount: 600 mg      heparin, porcine, 5000 UNIT/ML injection Inject 1 mL into the skin every 8 hours      oxyCODONE (ROXICODONE) 5 MG immediate release tablet Take 1 tablet by mouth every 6 hours as needed for Pain. For 3 days while in rehab      polyethylene glycol (GLYCOLAX) 17 g packet Take 1 packet by mouth daily While on oxycodone.  If unable to adequately hydrate, then discontinue.      rivastigmine (EXELON) 9.5 MG/24HR Place 1 patch onto the skin daily      rosuvastatin (CRESTOR) 40 MG tablet Take 1 tablet by mouth every evening      senna (SENOKOT) 8.6 MG tablet Take 2 tablets by mouth daily      simethicone (MYLICON) 80 MG chewable tablet Take 1 tablet by mouth 3 times daily as needed for Flatulence      cyanocobalamin 1000 MCG tablet Take 1 tablet by mouth daily      LACTOBACILLUS PO Take 1 tablet by mouth 3 times daily (with meals) Chew 1 tablet by mouth tid with meals. Refrigerate after opening      cilostazol (PLETAL) 50 MG tablet Take 1 tablet by mouth 2 times daily      aspirin 81 MG EC tablet Take 1 tablet by mouth daily           Vancomycin 1 g Q 24h         Patient was seen and examined by Dr. Guevara and plan of care reviewed.  Treatment plan was formulated collaboratively.    Time spent: 14 minutes    Signed:  Electronically signed by GEOVANI Tucker on 6/30/2025 at 11:14 AM       Thank you Birdie Suggs APRN for the opportunity to be involved in this patient's care. If you have any questions or concerns please feel free to contact me at [phone].

## 2025-06-30 NOTE — PROGRESS NOTES
[facility]. Packet sent with pt. Transport by ems by stretcher, nad noted.

## 2025-06-30 NOTE — PROGRESS NOTES
SLP ALL NOTES  Facility/Department: [facility] SURG   CLINICAL BEDSIDE SWALLOW EVALUATION    NAME: Jose Palma  : 1945  MRN: [id number]    ADMISSION DATE: 2025  ADMITTING DIAGNOSIS: has Osteoarthritis of both knees; Benign essential HTN; Coronary artery disease involving native coronary artery of native heart without angina pectoris; Anxiety; Nausea and vomiting; Gastroesophageal reflux disease; Hyperlipidemia; Declining functional status; Acute renal failure superimposed on stage 3 chronic kidney disease (HCC); History of fracture of left hip; Anemia; PAD (peripheral artery disease); Chronic C6 motor radiculopathy; Chronic bilateral low back pain; Cardiac murmur; AAA (abdominal aortic aneurysm); Vitamin D deficiency; MRSA infection; CKD stage 3b, GFR 30-44 ml/min (HCC); Hypotension; and Hypokalemia on their problem list.  ONSET DATE: 2025    Recent Chest Xray/CT of Chest: N/A    Date of Eval: 2025  Evaluating Therapist: SARANYA Mccann    Current Diet level:  Current Diet : Regular  Current Liquid Diet : Thin    Primary Complaint  Patient c/o \"gas bubbles\" during PO intake, which \"causes the food to get stuck\".    Pain:  Pain Assessment  Pain Assessment: None - Denies Pain  Pain Level: 4  Patient's Stated Pain Goal: 4  Pain Location: Other (Comment) (heel)  Pain Orientation: Right  Pain Descriptors: Aching, Nagging    Reason for Referral  Jose Palma was referred for a bedside swallow evaluation to assess the efficiency of his swallow function, identify signs and symptoms of aspiration and make recommendations regarding safe dietary consistencies, effective compensatory strategies, and safe eating environment.    Impression  Dysphagia Diagnosis: Swallow function appears WFL;Concerns for esophageal stage dysphagia;Suspected needs further assessment  Dysphagia Impression : No s/sx of oral or pharyngeal phase dysphagia. Recommend GI workup d/t suspected esophageal

## 2025-07-17 ENCOUNTER — HOSPITAL ENCOUNTER (OUTPATIENT)
Facility: HOSPITAL | Age: 80
Discharge: HOME | End: 2025-07-17
Payer: MEDICARE

## 2025-07-17 LAB
HCT VFR BLD AUTO: 30.4 % (ref 40–54)
HGB BLD-MCNC: 9.3 G/DL (ref 13–18)

## 2025-07-17 PROCEDURE — 85014 HEMATOCRIT: CPT

## 2025-07-17 PROCEDURE — 85018 HEMOGLOBIN: CPT

## 2025-07-24 ENCOUNTER — HOSPITAL ENCOUNTER (OUTPATIENT)
Facility: HOSPITAL | Age: 80
Discharge: HOME | End: 2025-07-24
Payer: MEDICARE

## 2025-07-24 ENCOUNTER — OFFICE VISIT (OUTPATIENT)
Age: 80
End: 2025-07-24

## 2025-07-24 DIAGNOSIS — N18.32 CKD STAGE 3B, GFR 30-44 ML/MIN (HCC): ICD-10-CM

## 2025-07-24 DIAGNOSIS — I25.10 CORONARY ARTERY DISEASE INVOLVING NATIVE CORONARY ARTERY OF NATIVE HEART WITHOUT ANGINA PECTORIS: ICD-10-CM

## 2025-07-24 DIAGNOSIS — R53.81 DECLINING FUNCTIONAL STATUS: Primary | ICD-10-CM

## 2025-07-24 DIAGNOSIS — I71.40 ABDOMINAL AORTIC ANEURYSM (AAA) WITHOUT RUPTURE, UNSPECIFIED PART: ICD-10-CM

## 2025-07-24 DIAGNOSIS — D64.9 ANEMIA, UNSPECIFIED TYPE: ICD-10-CM

## 2025-07-24 DIAGNOSIS — Z87.81 HISTORY OF FRACTURE OF LEFT HIP: ICD-10-CM

## 2025-07-24 DIAGNOSIS — I10 BENIGN ESSENTIAL HTN: ICD-10-CM

## 2025-07-24 LAB
ALBUMIN SERPL-MCNC: 3.1 G/DL (ref 3.4–4.8)
ALBUMIN/GLOB SERPL: 1.1 {RATIO} (ref 0.8–2)
ALP SERPL-CCNC: 108 U/L (ref 25–100)
ALT SERPL-CCNC: 20 U/L (ref 4–36)
ANION GAP SERPL CALCULATED.3IONS-SCNC: 9 MMOL/L (ref 3–16)
AST SERPL-CCNC: 18 U/L (ref 8–33)
BASOPHILS # BLD: 0.1 K/UL (ref 0–0.1)
BASOPHILS NFR BLD: 0.6 %
BILIRUB SERPL-MCNC: <0.2 MG/DL (ref 0.3–1.2)
BUN SERPL-MCNC: 22 MG/DL (ref 6–20)
CALCIUM SERPL-MCNC: 9.1 MG/DL (ref 8.3–10.6)
CHLORIDE SERPL-SCNC: 109 MMOL/L (ref 98–107)
CO2 SERPL-SCNC: 23 MMOL/L (ref 20–30)
CREAT SERPL-MCNC: 1.7 MG/DL (ref 0.8–1.3)
EOSINOPHIL # BLD: 0.4 K/UL (ref 0–0.4)
EOSINOPHIL NFR BLD: 4.1 %
ERYTHROCYTE [DISTWIDTH] IN BLOOD BY AUTOMATED COUNT: 15.2 % (ref 11–16)
FERRITIN SERPL IA-MCNC: 232 NG/ML (ref 22–322)
GFR SERPLBLD CREATININE-BSD FMLA CKD-EPI: 41 ML/MIN/{1.73_M2}
GLOBULIN SER CALC-MCNC: 2.7 G/DL
GLUCOSE SERPL-MCNC: 90 MG/DL (ref 74–106)
HCT VFR BLD AUTO: 30.5 % (ref 40–54)
HGB BLD-MCNC: 9.4 G/DL (ref 13–18)
IMM GRANULOCYTES # BLD: 0 K/UL
IMM GRANULOCYTES NFR BLD: 0.2 % (ref 0–5)
IRON SATN MFR SERPL: 18 % (ref 20–50)
IRON SERPL-MCNC: 33 UG/DL (ref 59–158)
LYMPHOCYTES # BLD: 1.5 K/UL (ref 1.5–4)
LYMPHOCYTES NFR BLD: 17.7 %
MCH RBC QN AUTO: 29.7 PG (ref 27–32)
MCHC RBC AUTO-ENTMCNC: 30.8 G/DL (ref 31–35)
MCV RBC AUTO: 96.2 FL (ref 80–100)
MONOCYTES # BLD: 0.8 K/UL (ref 0.2–0.8)
MONOCYTES NFR BLD: 9.4 %
NEUTROPHILS # BLD: 5.9 K/UL (ref 2–7.5)
NEUTS SEG NFR BLD: 68 %
PLATELET # BLD AUTO: 260 K/UL (ref 150–400)
PMV BLD AUTO: 10.5 FL (ref 6–10)
POTASSIUM SERPL-SCNC: 4.1 MMOL/L (ref 3.4–5.1)
PROT SERPL-MCNC: 5.8 G/DL (ref 6.4–8.3)
RBC # BLD AUTO: 3.17 M/UL (ref 4.5–6)
SODIUM SERPL-SCNC: 141 MMOL/L (ref 136–145)
TIBC SERPL-MCNC: 184 UG/DL (ref 250–450)
WBC # BLD AUTO: 8.7 K/UL (ref 4–11)

## 2025-07-24 PROCEDURE — 82728 ASSAY OF FERRITIN: CPT

## 2025-07-24 PROCEDURE — 85025 COMPLETE CBC W/AUTO DIFF WBC: CPT

## 2025-07-24 PROCEDURE — 83540 ASSAY OF IRON: CPT

## 2025-07-24 PROCEDURE — 83550 IRON BINDING TEST: CPT

## 2025-07-24 PROCEDURE — 80053 COMPREHEN METABOLIC PANEL: CPT

## 2025-07-24 NOTE — PROGRESS NOTES
[facility]                                                                                                                                    H&P     Jose Palma   1945 07/24/25    CHIEF COMPLAINT:    Patient is a new resident at the facility. Patient is here to establish care. Medical problems include: History of hip fracture, anemia, CRF, hypertension, CAD and others      HPI:   Mr. Palma is a 79-year-old new resident at this facility with past medical history is significant for CAD, PAD, hypertension, CRF stage III, AAA, chronic back pain who had a fall that resulted in intertrochanteric hip fracture status post CMN on 5/30/2025 complicated by left hip surgical site bacterial infection.  [facility].  Per discharge summary patient initially was treated with vancomycin.  CRP was elevated at 78 which was up from 13 about a week or so prior.  Sed rate also was elevated at 71.  MRI was ordered which showed posttraumatic and postsurgical changes but no evidence of deep infection.  Patient was evaluated by both orthopedic surgery and infectious disease and neither had concern for deep infection.  Patient was transition from IV vancomycin to oral Zyvox for an additional 7 days of antibiotic coverage per infectious disease.  PT/OT were consulted and recommended patient to have a stay for subacute rehab prior to going back home.  Patient reported he has been slowly and gradually improving since arrival to this facility.  He been compliant with treatment plan including medication management and participating in physical therapy.  Mobility improving slowly.  Denies any redness at the wound site or any fever or chills.    Patient has been compliant with taking medications.  No side effect reported.  Staff reported no acute issues since admission.     Medication list and allergies reviewed as documented in the

## 2025-07-30 DIAGNOSIS — M54.50 CHRONIC BILATERAL LOW BACK PAIN, UNSPECIFIED WHETHER SCIATICA PRESENT: Primary | ICD-10-CM

## 2025-07-30 DIAGNOSIS — G89.29 CHRONIC BILATERAL LOW BACK PAIN, UNSPECIFIED WHETHER SCIATICA PRESENT: Primary | ICD-10-CM

## 2025-07-30 RX ORDER — GABAPENTIN 300 MG/1
300 CAPSULE ORAL 2 TIMES DAILY
Qty: 60 CAPSULE | Refills: 0 | Status: SHIPPED | OUTPATIENT
Start: 2025-07-30 | End: 2025-08-29

## 2025-07-31 ENCOUNTER — HOSPITAL ENCOUNTER (OUTPATIENT)
Facility: HOSPITAL | Age: 80
Discharge: HOME | End: 2025-07-31

## 2025-07-31 LAB
HCT VFR BLD AUTO: 31.8 % (ref 40–54)
HGB BLD-MCNC: 10 G/DL (ref 13–18)

## 2025-07-31 PROCEDURE — 85014 HEMATOCRIT: CPT

## 2025-07-31 PROCEDURE — 85018 HEMOGLOBIN: CPT

## 2025-08-02 ASSESSMENT — ENCOUNTER SYMPTOMS
SINUS PRESSURE: 0
VOMITING: 0
NAUSEA: 0
BACK PAIN: 1
ABDOMINAL PAIN: 0
WHEEZING: 0
COUGH: 0
SHORTNESS OF BREATH: 0
EYE DISCHARGE: 0

## (undated) DEVICE — CVR PROB ULTRASND/TRANSD W/GEL 7X11IN STRL

## (undated) DEVICE — MODEL AT P65, P/N 701554-001KIT CONTENTS: HAND CONTROLLER, 3-WAY HIGH-PRESSURE STOPCOCK WITH ROTATING END AND PREMIUM HIGH-PRESSURE TUBING: Brand: ANGIOTOUCH® KIT

## (undated) DEVICE — DEV COMP RAD PRELUDESYNC 29CM

## (undated) DEVICE — Device

## (undated) DEVICE — RADIFOCUS GLIDEWIRE: Brand: GLIDEWIRE

## (undated) DEVICE — RADIFOCUS GLIDEWIRE ADVANTAGE GUIDEWIRE: Brand: GLIDEWIRE ADVANTAGE

## (undated) DEVICE — VIPERTRACK, 50 PACK: Brand: VIPERTRACK

## (undated) DEVICE — CATH IMG IVUS PIONEER PLS .014GW 6F 120CM

## (undated) DEVICE — CATH OMNI FLUSH 5FR

## (undated) DEVICE — SYS CLS VASC/VENI VASCADE BIOABS 5F

## (undated) DEVICE — PINNACLE INTRODUCER SHEATH: Brand: PINNACLE

## (undated) DEVICE — BALN EUPHORA 3X30MM

## (undated) DEVICE — RUNTHROUGH NS EXTRA FLOPPY PTCA GUIDEWIRE: Brand: RUNTHROUGH

## (undated) DEVICE — PK CATH CARD 10

## (undated) DEVICE — GLIDESHEATH SLENDER STAINLESS STEEL KIT: Brand: GLIDESHEATH SLENDER

## (undated) DEVICE — MODEL BT2000 P/N 700287-012KIT CONTENTS: MANIFOLD WITH SALINE AND CONTRAST PORTS, SALINE TUBING WITH SPIKE AND HAND SYRINGE, TRANSDUCER: Brand: BT2000 AUTOMATED MANIFOLD KIT

## (undated) DEVICE — ST ACC MICROPUNCTURE .018 TRANSLSS/SS/TP 5F/10CM 21G/7CM

## (undated) DEVICE — ANGIO-SEAL VIP VASCULAR CLOSURE DEVICE: Brand: ANGIO-SEAL

## (undated) DEVICE — GW J TP FIX CORE .035 150

## (undated) DEVICE — ATHERECTOMY H1-LS HAWKONE 7F STD TIP US: Brand: HAWKONE™

## (undated) DEVICE — DEV EPS SPIDERFX 7MM OTW320/RX190CM

## (undated) DEVICE — DESTINATION RENAL GUIDING SHEATH: Brand: DESTINATION

## (undated) DEVICE — QUICK-CROSS™ SUPPORT CATHETER: Brand: QUICK-CROSS™

## (undated) DEVICE — PRESSURE MONITORING SET: Brand: TRUWAVE, VAMP

## (undated) DEVICE — HT WINN 40 GUIDE WIRE .014" X 300 CM: Brand: HI-TORQUE WINN

## (undated) DEVICE — RADIFOCUS TORQUE DEVICE MULTI-TORQUE VISE: Brand: RADIFOCUS TORQUE DEVICE

## (undated) DEVICE — RADIFOCUS GLIDECATH: Brand: GLIDECATH

## (undated) DEVICE — DEV INFL MONARCH 25W

## (undated) DEVICE — INTRAOPERATIVE COVER KIT, 10 PACK: Brand: SITE-RITE